# Patient Record
Sex: FEMALE | Race: WHITE | Employment: OTHER | URBAN - NONMETROPOLITAN AREA
[De-identification: names, ages, dates, MRNs, and addresses within clinical notes are randomized per-mention and may not be internally consistent; named-entity substitution may affect disease eponyms.]

---

## 2017-01-27 ENCOUNTER — OFFICE VISIT (OUTPATIENT)
Dept: PRIMARY CARE CLINIC | Age: 72
End: 2017-01-27

## 2017-01-27 VITALS
BODY MASS INDEX: 25.19 KG/M2 | OXYGEN SATURATION: 97 % | HEART RATE: 85 BPM | WEIGHT: 151.2 LBS | DIASTOLIC BLOOD PRESSURE: 68 MMHG | HEIGHT: 65 IN | SYSTOLIC BLOOD PRESSURE: 116 MMHG | RESPIRATION RATE: 16 BRPM

## 2017-01-27 DIAGNOSIS — M79.632 LEFT FOREARM PAIN: Primary | ICD-10-CM

## 2017-01-27 PROCEDURE — 99213 OFFICE O/P EST LOW 20 MIN: CPT | Performed by: NURSE PRACTITIONER

## 2017-01-27 RX ORDER — PREDNISONE 20 MG/1
20 TABLET ORAL 2 TIMES DAILY
Qty: 10 TABLET | Refills: 0 | Status: SHIPPED | OUTPATIENT
Start: 2017-01-27 | End: 2017-02-01

## 2017-02-08 DIAGNOSIS — N32.89 SPASTIC BLADDER: ICD-10-CM

## 2017-02-08 RX ORDER — OXYBUTYNIN CHLORIDE 10 MG/1
TABLET, EXTENDED RELEASE ORAL
Qty: 90 TABLET | Refills: 3 | Status: SHIPPED | OUTPATIENT
Start: 2017-02-08 | End: 2018-02-05 | Stop reason: SDUPTHER

## 2017-02-09 ENCOUNTER — OFFICE VISIT (OUTPATIENT)
Dept: INTERNAL MEDICINE | Age: 72
End: 2017-02-09

## 2017-02-09 VITALS
HEIGHT: 65 IN | HEART RATE: 72 BPM | BODY MASS INDEX: 25.39 KG/M2 | DIASTOLIC BLOOD PRESSURE: 62 MMHG | WEIGHT: 152.4 LBS | SYSTOLIC BLOOD PRESSURE: 138 MMHG

## 2017-02-09 DIAGNOSIS — M79.632 LEFT FOREARM PAIN: Primary | ICD-10-CM

## 2017-02-09 PROCEDURE — L3908 WHO COCK-UP NONMOLDE PRE OTS: HCPCS | Performed by: NURSE PRACTITIONER

## 2017-02-09 PROCEDURE — 99213 OFFICE O/P EST LOW 20 MIN: CPT | Performed by: NURSE PRACTITIONER

## 2017-02-13 ASSESSMENT — ENCOUNTER SYMPTOMS: COLOR CHANGE: 0

## 2017-02-15 ENCOUNTER — OFFICE VISIT (OUTPATIENT)
Dept: ORTHOPEDIC SURGERY | Age: 72
End: 2017-02-15

## 2017-02-15 VITALS
HEART RATE: 79 BPM | BODY MASS INDEX: 25.33 KG/M2 | DIASTOLIC BLOOD PRESSURE: 66 MMHG | HEIGHT: 65 IN | WEIGHT: 152 LBS | SYSTOLIC BLOOD PRESSURE: 132 MMHG

## 2017-02-15 DIAGNOSIS — M65.4 DE QUERVAIN'S TENOSYNOVITIS, LEFT: Primary | ICD-10-CM

## 2017-02-15 PROCEDURE — L3923 HFO WITHOUT JOINTS PRE CST: HCPCS | Performed by: FAMILY MEDICINE

## 2017-02-15 PROCEDURE — 20550 NJX 1 TENDON SHEATH/LIGAMENT: CPT | Performed by: FAMILY MEDICINE

## 2017-02-15 PROCEDURE — 99214 OFFICE O/P EST MOD 30 MIN: CPT | Performed by: FAMILY MEDICINE

## 2017-02-15 RX ORDER — BUPIVACAINE HYDROCHLORIDE 5 MG/ML
1 INJECTION, SOLUTION PERINEURAL ONCE
Status: COMPLETED | OUTPATIENT
Start: 2017-02-15 | End: 2017-02-15

## 2017-02-15 RX ORDER — BETAMETHASONE SODIUM PHOSPHATE AND BETAMETHASONE ACETATE 3; 3 MG/ML; MG/ML
6 INJECTION, SUSPENSION INTRA-ARTICULAR; INTRALESIONAL; INTRAMUSCULAR; SOFT TISSUE ONCE
Status: COMPLETED | OUTPATIENT
Start: 2017-02-15 | End: 2017-02-15

## 2017-02-15 RX ADMIN — BETAMETHASONE SODIUM PHOSPHATE AND BETAMETHASONE ACETATE 6 MG: 3; 3 INJECTION, SUSPENSION INTRA-ARTICULAR; INTRALESIONAL; INTRAMUSCULAR; SOFT TISSUE at 15:02

## 2017-02-15 RX ADMIN — BUPIVACAINE HYDROCHLORIDE 5 MG: 5 INJECTION, SOLUTION PERINEURAL at 15:03

## 2017-03-02 ENCOUNTER — OFFICE VISIT (OUTPATIENT)
Dept: ORTHOPEDIC SURGERY | Age: 72
End: 2017-03-02

## 2017-03-02 VITALS
HEART RATE: 73 BPM | BODY MASS INDEX: 25.83 KG/M2 | SYSTOLIC BLOOD PRESSURE: 142 MMHG | WEIGHT: 155 LBS | DIASTOLIC BLOOD PRESSURE: 81 MMHG | HEIGHT: 65 IN

## 2017-03-02 DIAGNOSIS — M65.4 DE QUERVAIN'S TENOSYNOVITIS, LEFT: ICD-10-CM

## 2017-03-02 DIAGNOSIS — M17.0 PRIMARY OSTEOARTHRITIS OF KNEES, BILATERAL: Primary | ICD-10-CM

## 2017-03-02 PROCEDURE — 20610 DRAIN/INJ JOINT/BURSA W/O US: CPT | Performed by: FAMILY MEDICINE

## 2017-03-02 PROCEDURE — 99214 OFFICE O/P EST MOD 30 MIN: CPT | Performed by: FAMILY MEDICINE

## 2017-03-02 RX ORDER — BUPIVACAINE HYDROCHLORIDE 5 MG/ML
4 INJECTION, SOLUTION PERINEURAL ONCE
Status: COMPLETED | OUTPATIENT
Start: 2017-03-02 | End: 2017-03-02

## 2017-03-02 RX ORDER — TRIAMCINOLONE ACETONIDE 40 MG/ML
40 INJECTION, SUSPENSION INTRA-ARTICULAR; INTRAMUSCULAR ONCE
Status: COMPLETED | OUTPATIENT
Start: 2017-03-02 | End: 2017-03-02

## 2017-03-02 RX ADMIN — TRIAMCINOLONE ACETONIDE 40 MG: 40 INJECTION, SUSPENSION INTRA-ARTICULAR; INTRAMUSCULAR at 09:35

## 2017-03-02 RX ADMIN — BUPIVACAINE HYDROCHLORIDE 20 MG: 5 INJECTION, SOLUTION PERINEURAL at 09:33

## 2017-03-02 RX ADMIN — TRIAMCINOLONE ACETONIDE 40 MG: 40 INJECTION, SUSPENSION INTRA-ARTICULAR; INTRAMUSCULAR at 09:34

## 2017-03-02 RX ADMIN — BUPIVACAINE HYDROCHLORIDE 20 MG: 5 INJECTION, SOLUTION PERINEURAL at 09:34

## 2017-04-05 ENCOUNTER — OFFICE VISIT (OUTPATIENT)
Dept: ORTHOPEDIC SURGERY | Age: 72
End: 2017-04-05
Payer: MEDICARE

## 2017-04-05 ENCOUNTER — OFFICE VISIT (OUTPATIENT)
Dept: INTERNAL MEDICINE | Age: 72
End: 2017-04-05
Payer: MEDICARE

## 2017-04-05 VITALS
HEART RATE: 90 BPM | WEIGHT: 155 LBS | SYSTOLIC BLOOD PRESSURE: 168 MMHG | BODY MASS INDEX: 25.83 KG/M2 | DIASTOLIC BLOOD PRESSURE: 93 MMHG | HEIGHT: 65 IN

## 2017-04-05 VITALS
DIASTOLIC BLOOD PRESSURE: 82 MMHG | BODY MASS INDEX: 24.16 KG/M2 | HEART RATE: 80 BPM | SYSTOLIC BLOOD PRESSURE: 156 MMHG | WEIGHT: 145 LBS | HEIGHT: 65 IN

## 2017-04-05 DIAGNOSIS — I10 ESSENTIAL HYPERTENSION: ICD-10-CM

## 2017-04-05 DIAGNOSIS — M65.832 EXTENSOR TENOSYNOVITIS OF WRIST, LEFT: ICD-10-CM

## 2017-04-05 DIAGNOSIS — M65.4 DE QUERVAIN'S TENOSYNOVITIS, LEFT: Primary | ICD-10-CM

## 2017-04-05 PROCEDURE — 99213 OFFICE O/P EST LOW 20 MIN: CPT | Performed by: NURSE PRACTITIONER

## 2017-04-05 PROCEDURE — 99213 OFFICE O/P EST LOW 20 MIN: CPT | Performed by: FAMILY MEDICINE

## 2017-04-05 RX ORDER — HYDROCHLOROTHIAZIDE 25 MG/1
25 TABLET ORAL DAILY
Qty: 30 TABLET | Refills: 0 | Status: SHIPPED | OUTPATIENT
Start: 2017-04-05 | End: 2017-04-06 | Stop reason: CLARIF

## 2017-04-05 RX ORDER — LISINOPRIL AND HYDROCHLOROTHIAZIDE 25; 20 MG/1; MG/1
1 TABLET ORAL DAILY
Qty: 90 TABLET | Refills: 2 | Status: SHIPPED | OUTPATIENT
Start: 2017-04-05 | End: 2017-04-19

## 2017-04-06 ASSESSMENT — ENCOUNTER SYMPTOMS
COUGH: 0
PHOTOPHOBIA: 0
WHEEZING: 0
SHORTNESS OF BREATH: 0

## 2017-04-19 ENCOUNTER — OFFICE VISIT (OUTPATIENT)
Dept: ORTHOPEDIC SURGERY | Age: 72
End: 2017-04-19
Payer: MEDICARE

## 2017-04-19 VITALS
SYSTOLIC BLOOD PRESSURE: 122 MMHG | WEIGHT: 155 LBS | HEIGHT: 65 IN | HEART RATE: 64 BPM | BODY MASS INDEX: 25.83 KG/M2 | DIASTOLIC BLOOD PRESSURE: 78 MMHG

## 2017-04-19 DIAGNOSIS — M17.0 PRIMARY OSTEOARTHRITIS OF KNEES, BILATERAL: Primary | ICD-10-CM

## 2017-04-19 DIAGNOSIS — M65.4 DE QUERVAIN'S TENOSYNOVITIS, LEFT: ICD-10-CM

## 2017-04-19 PROCEDURE — 20610 DRAIN/INJ JOINT/BURSA W/O US: CPT | Performed by: FAMILY MEDICINE

## 2017-04-19 PROCEDURE — 99214 OFFICE O/P EST MOD 30 MIN: CPT | Performed by: FAMILY MEDICINE

## 2017-04-19 RX ORDER — HYALURONATE SODIUM 10 MG/ML
20 SYRINGE (ML) INTRAARTICULAR ONCE
Status: COMPLETED | OUTPATIENT
Start: 2017-04-19 | End: 2017-04-20

## 2017-04-19 RX ORDER — HYALURONATE SODIUM 10 MG/ML
20 SYRINGE (ML) INTRAARTICULAR WEEKLY
Status: DISCONTINUED | OUTPATIENT
Start: 2017-04-19 | End: 2017-04-19

## 2017-04-19 RX ADMIN — Medication 20 MG: at 12:56

## 2017-04-19 RX ADMIN — Medication 20 MG: at 12:59

## 2017-04-20 RX ADMIN — Medication 20 MG: at 08:51

## 2017-04-20 RX ADMIN — Medication 20 MG: at 08:52

## 2017-04-26 ENCOUNTER — OFFICE VISIT (OUTPATIENT)
Dept: ORTHOPEDIC SURGERY | Age: 72
End: 2017-04-26
Payer: MEDICARE

## 2017-04-26 VITALS
WEIGHT: 155 LBS | HEIGHT: 65 IN | SYSTOLIC BLOOD PRESSURE: 160 MMHG | HEART RATE: 62 BPM | BODY MASS INDEX: 25.83 KG/M2 | DIASTOLIC BLOOD PRESSURE: 76 MMHG

## 2017-04-26 DIAGNOSIS — M17.0 BILATERAL PRIMARY OSTEOARTHRITIS OF KNEE: Primary | ICD-10-CM

## 2017-04-26 PROCEDURE — 20610 DRAIN/INJ JOINT/BURSA W/O US: CPT | Performed by: FAMILY MEDICINE

## 2017-04-26 RX ORDER — HYALURONATE SODIUM 10 MG/ML
20 SYRINGE (ML) INTRAARTICULAR ONCE
Status: COMPLETED | OUTPATIENT
Start: 2017-04-26 | End: 2017-04-26

## 2017-04-26 RX ADMIN — Medication 20 MG: at 13:21

## 2017-04-26 RX ADMIN — Medication 20 MG: at 13:22

## 2017-05-10 ENCOUNTER — OFFICE VISIT (OUTPATIENT)
Dept: ORTHOPEDIC SURGERY | Age: 72
End: 2017-05-10
Payer: MEDICARE

## 2017-05-10 VITALS — HEIGHT: 65 IN | BODY MASS INDEX: 25.83 KG/M2 | WEIGHT: 155 LBS

## 2017-05-10 DIAGNOSIS — M17.0 PRIMARY OSTEOARTHRITIS OF BOTH KNEES: Primary | ICD-10-CM

## 2017-05-10 PROCEDURE — 99024 POSTOP FOLLOW-UP VISIT: CPT | Performed by: FAMILY MEDICINE

## 2017-05-10 PROCEDURE — 20610 DRAIN/INJ JOINT/BURSA W/O US: CPT | Performed by: FAMILY MEDICINE

## 2017-05-10 RX ORDER — HYALURONATE SODIUM 10 MG/ML
20 SYRINGE (ML) INTRAARTICULAR ONCE
Status: COMPLETED | OUTPATIENT
Start: 2017-05-10 | End: 2017-05-10

## 2017-05-10 RX ORDER — MELOXICAM 15 MG/1
15 TABLET ORAL DAILY
Qty: 30 TABLET | Refills: 2 | Status: SHIPPED | OUTPATIENT
Start: 2017-05-10 | End: 2017-06-26

## 2017-05-10 RX ADMIN — Medication 20 MG: at 11:04

## 2017-05-10 RX ADMIN — Medication 20 MG: at 11:03

## 2017-05-11 RX ORDER — SIMVASTATIN 20 MG
TABLET ORAL
Qty: 90 TABLET | Refills: 3 | Status: SHIPPED | OUTPATIENT
Start: 2017-05-11 | End: 2017-11-27

## 2017-05-24 ENCOUNTER — OFFICE VISIT (OUTPATIENT)
Dept: PULMONOLOGY | Age: 72
End: 2017-05-24
Payer: MEDICARE

## 2017-05-24 ENCOUNTER — TELEPHONE (OUTPATIENT)
Dept: ORTHOPEDIC SURGERY | Age: 72
End: 2017-05-24

## 2017-05-24 VITALS
RESPIRATION RATE: 18 BRPM | HEART RATE: 61 BPM | WEIGHT: 145.8 LBS | BODY MASS INDEX: 24.29 KG/M2 | SYSTOLIC BLOOD PRESSURE: 124 MMHG | HEIGHT: 65 IN | DIASTOLIC BLOOD PRESSURE: 62 MMHG | OXYGEN SATURATION: 99 %

## 2017-05-24 DIAGNOSIS — Z86.19: ICD-10-CM

## 2017-05-24 DIAGNOSIS — B44.9 ASPERGILLUS (HCC): ICD-10-CM

## 2017-05-24 DIAGNOSIS — A31.0 PULMONARY MAI (MYCOBACTERIUM AVIUM-INTRACELLULARE) INFECTION (HCC): Primary | ICD-10-CM

## 2017-05-24 DIAGNOSIS — I10 ESSENTIAL HYPERTENSION: ICD-10-CM

## 2017-05-24 PROCEDURE — 99214 OFFICE O/P EST MOD 30 MIN: CPT | Performed by: INTERNAL MEDICINE

## 2017-06-07 DIAGNOSIS — M25.561 CHRONIC PAIN OF BOTH KNEES: Primary | ICD-10-CM

## 2017-06-07 DIAGNOSIS — M25.562 CHRONIC PAIN OF BOTH KNEES: Primary | ICD-10-CM

## 2017-06-07 DIAGNOSIS — G89.29 CHRONIC PAIN OF BOTH KNEES: Primary | ICD-10-CM

## 2017-06-08 ENCOUNTER — TELEPHONE (OUTPATIENT)
Dept: ORTHOPEDIC SURGERY | Age: 72
End: 2017-06-08

## 2017-06-12 RX ORDER — LEVOTHYROXINE SODIUM 0.05 MG/1
TABLET ORAL
Qty: 90 TABLET | Refills: 3 | Status: SHIPPED | OUTPATIENT
Start: 2017-06-12 | End: 2018-06-07 | Stop reason: SDUPTHER

## 2017-06-15 RX ORDER — AMLODIPINE BESYLATE 10 MG/1
TABLET ORAL
Qty: 90 TABLET | Refills: 3 | Status: SHIPPED | OUTPATIENT
Start: 2017-06-15 | End: 2018-06-11 | Stop reason: SDUPTHER

## 2017-06-23 ENCOUNTER — NURSE ONLY (OUTPATIENT)
Dept: LAB | Age: 72
End: 2017-06-23
Payer: MEDICARE

## 2017-06-23 DIAGNOSIS — I10 ESSENTIAL HYPERTENSION: ICD-10-CM

## 2017-06-23 DIAGNOSIS — A31.0 PULMONARY MAI (MYCOBACTERIUM AVIUM-INTRACELLULARE) INFECTION (HCC): ICD-10-CM

## 2017-06-23 LAB
ALBUMIN SERPL-MCNC: 4.2 G/DL (ref 3.5–5.2)
ALBUMIN/GLOBULIN RATIO: 1.6 (ref 1–2.5)
ALP BLD-CCNC: 61 U/L (ref 35–104)
ALT SERPL-CCNC: 15 U/L (ref 5–33)
ANION GAP SERPL CALCULATED.3IONS-SCNC: 12 MMOL/L (ref 9–17)
AST SERPL-CCNC: 15 U/L
BILIRUB SERPL-MCNC: 0.58 MG/DL (ref 0.3–1.2)
BILIRUBIN DIRECT: 0.17 MG/DL
BILIRUBIN, INDIRECT: 0.41 MG/DL (ref 0–1)
BUN BLDV-MCNC: 28 MG/DL (ref 8–23)
BUN/CREAT BLD: 31 (ref 9–20)
CALCIUM SERPL-MCNC: 9.6 MG/DL (ref 8.6–10.4)
CHLORIDE BLD-SCNC: 103 MMOL/L (ref 98–107)
CO2: 30 MMOL/L (ref 20–31)
CREAT SERPL-MCNC: 0.89 MG/DL (ref 0.5–0.9)
GFR AFRICAN AMERICAN: >60 ML/MIN
GFR NON-AFRICAN AMERICAN: >60 ML/MIN
GFR SERPL CREATININE-BSD FRML MDRD: ABNORMAL ML/MIN/{1.73_M2}
GFR SERPL CREATININE-BSD FRML MDRD: ABNORMAL ML/MIN/{1.73_M2}
GLOBULIN: 2.6 G/DL (ref 1.5–3.8)
GLUCOSE BLD-MCNC: 99 MG/DL (ref 70–99)
POTASSIUM SERPL-SCNC: 3.9 MMOL/L (ref 3.7–5.3)
SODIUM BLD-SCNC: 145 MMOL/L (ref 135–144)
TOTAL PROTEIN: 6.8 G/DL (ref 6.4–8.3)

## 2017-06-23 PROCEDURE — 36415 COLL VENOUS BLD VENIPUNCTURE: CPT | Performed by: NURSE PRACTITIONER

## 2017-06-23 PROCEDURE — 80076 HEPATIC FUNCTION PANEL: CPT | Performed by: INTERNAL MEDICINE

## 2017-06-23 PROCEDURE — 80048 BASIC METABOLIC PNL TOTAL CA: CPT | Performed by: NURSE PRACTITIONER

## 2017-06-26 ENCOUNTER — OFFICE VISIT (OUTPATIENT)
Dept: INTERNAL MEDICINE | Age: 72
End: 2017-06-26
Payer: MEDICARE

## 2017-06-26 VITALS
SYSTOLIC BLOOD PRESSURE: 118 MMHG | RESPIRATION RATE: 16 BRPM | BODY MASS INDEX: 24.26 KG/M2 | HEART RATE: 62 BPM | HEIGHT: 65 IN | DIASTOLIC BLOOD PRESSURE: 60 MMHG | WEIGHT: 145.6 LBS

## 2017-06-26 DIAGNOSIS — J47.9 BRONCHIECTASIS WITHOUT COMPLICATION (HCC): ICD-10-CM

## 2017-06-26 DIAGNOSIS — E78.00 PURE HYPERCHOLESTEROLEMIA: Primary | ICD-10-CM

## 2017-06-26 DIAGNOSIS — Z12.31 ENCOUNTER FOR SCREENING MAMMOGRAM FOR MALIGNANT NEOPLASM OF BREAST: ICD-10-CM

## 2017-06-26 DIAGNOSIS — E03.9 HYPOTHYROIDISM, UNSPECIFIED TYPE: ICD-10-CM

## 2017-06-26 DIAGNOSIS — M25.561 RIGHT KNEE PAIN, UNSPECIFIED CHRONICITY: ICD-10-CM

## 2017-06-26 DIAGNOSIS — I10 ESSENTIAL HYPERTENSION: ICD-10-CM

## 2017-06-26 PROCEDURE — 99214 OFFICE O/P EST MOD 30 MIN: CPT | Performed by: INTERNAL MEDICINE

## 2017-06-26 ASSESSMENT — PATIENT HEALTH QUESTIONNAIRE - PHQ9
SUM OF ALL RESPONSES TO PHQ9 QUESTIONS 1 & 2: 0
2. FEELING DOWN, DEPRESSED OR HOPELESS: 0
1. LITTLE INTEREST OR PLEASURE IN DOING THINGS: 0
SUM OF ALL RESPONSES TO PHQ QUESTIONS 1-9: 0

## 2017-06-26 ASSESSMENT — ENCOUNTER SYMPTOMS
NAUSEA: 0
VOMITING: 0
BACK PAIN: 0
BLOOD IN STOOL: 0
DIARRHEA: 0
COUGH: 0
SHORTNESS OF BREATH: 0
ABDOMINAL PAIN: 0
EYE PAIN: 0
CONSTIPATION: 0

## 2017-06-28 ENCOUNTER — OFFICE VISIT (OUTPATIENT)
Dept: ORTHOPEDIC SURGERY | Age: 72
End: 2017-06-28
Payer: MEDICARE

## 2017-06-28 VITALS
WEIGHT: 145.06 LBS | DIASTOLIC BLOOD PRESSURE: 69 MMHG | BODY MASS INDEX: 24.17 KG/M2 | SYSTOLIC BLOOD PRESSURE: 165 MMHG | HEIGHT: 65 IN | HEART RATE: 54 BPM

## 2017-06-28 DIAGNOSIS — Z01.818 PRE-OP TESTING: ICD-10-CM

## 2017-06-28 DIAGNOSIS — M17.0 PRIMARY OSTEOARTHRITIS OF BOTH KNEES: Primary | ICD-10-CM

## 2017-06-28 PROCEDURE — 99203 OFFICE O/P NEW LOW 30 MIN: CPT | Performed by: ORTHOPAEDIC SURGERY

## 2017-06-28 RX ORDER — MELOXICAM 15 MG/1
15 TABLET ORAL DAILY
COMMUNITY
Start: 2017-05-10 | End: 2017-11-13 | Stop reason: ALTCHOICE

## 2017-06-28 RX ORDER — LISINOPRIL AND HYDROCHLOROTHIAZIDE 25; 20 MG/1; MG/1
1 TABLET ORAL EVERY EVENING
COMMUNITY
Start: 2017-06-16 | End: 2018-05-08 | Stop reason: ALTCHOICE

## 2017-06-28 ASSESSMENT — PATIENT HEALTH QUESTIONNAIRE - PHQ9
1. LITTLE INTEREST OR PLEASURE IN DOING THINGS: 0
SUM OF ALL RESPONSES TO PHQ9 QUESTIONS 1 & 2: 0
2. FEELING DOWN, DEPRESSED OR HOPELESS: 0
SUM OF ALL RESPONSES TO PHQ QUESTIONS 1-9: 0

## 2017-06-28 ASSESSMENT — KOOS JR
HOW SEVERE IS YOUR KNEE STIFFNESS AFTER FIRST WAKING IN MORNING: 2
BENDING TO THE FLOOR TO PICK UP OBJECT: 1
RISING FROM SITTING: 2
STRAIGHTENING KNEE FULLY: 2
GOING UP OR DOWN STAIRS: 3
TWISING OR PIVOTING ON KNEE: 3
STANDING UPRIGHT: 4

## 2017-06-28 ASSESSMENT — PROMIS GLOBAL HEALTH SCALE
TO WHAT EXTENT ARE YOU ABLE TO CARRY OUT YOUR EVERYDAY PHYSICAL ACTIVITIES SUCH AS WALKING, CLIMBING STAIRS, CARRYING GROCERIES, OR MOVING A CHAIR [ON A SCALE OF 1 (NOT AT ALL) TO 5 (COMPLETELY)]?: 1
WHO IS THE PERSON COMPLETING THE PROMIS V1.1 SURVEY?: 0
IN GENERAL, HOW WOULD YOU RATE YOUR SATISFACTION WITH YOUR SOCIAL ACTIVITIES AND RELATIONSHIPS [ON A SCALE OF 1 (POOR) TO 5 (EXCELLENT)]?: 5
IN GENERAL, WOULD YOU SAY YOUR HEALTH IS...[ON A SCALE OF 1 (POOR) TO 5 (EXCELLENT)]: 4
IN GENERAL, HOW WOULD YOU RATE YOUR PHYSICAL HEALTH [ON A SCALE OF 1 (POOR) TO 5 (EXCELLENT)]?: 4
IN THE PAST 7 DAYS, HOW WOULD YOU RATE YOUR FATIGUE ON AVERAGE [ON A SCALE FROM 1 (NONE) TO 5 (VERY SEVERE)]?: 5
IN GENERAL, HOW WOULD YOU RATE YOUR MENTAL HEALTH, INCLUDING YOUR MOOD AND YOUR ABILITY TO THINK [ON A SCALE OF 1 (POOR) TO 5 (EXCELLENT)]?: 5
IN THE PAST 7 DAYS, HOW WOULD YOU RATE YOUR PAIN ON AVERAGE [ON A SCALE FROM 0 (NO PAIN) TO 10 (WORST IMAGINABLE PAIN)]?: 8
SUM OF RESPONSES TO QUESTIONS 3, 6, 7, & 8: 18
IN THE PAST 7 DAYS, HOW OFTEN HAVE YOU BEEN BOTHERED BY EMOTIONAL PROBLEMS, SUCH AS FEELING ANXIOUS, DEPRESSED, OR IRRITABLE [ON A SCALE FROM 1 (NEVER) TO 5 (ALWAYS)]?: 1
HOW IS THE PROMIS V1.1 BEING ADMINISTERED?: 0
SUM OF RESPONSES TO QUESTIONS 2, 4, 5, & 10: 13
IN GENERAL, WOULD YOU SAY YOUR QUALITY OF LIFE IS...[ON A SCALE OF 1 (POOR) TO 5 (EXCELLENT)]: 2
IN GENERAL, PLEASE RATE HOW WELL YOU CARRY OUT YOUR USUAL SOCIAL ACTIVITIES (INCLUDES ACTIVITIES AT HOME, AT WORK, AND IN YOUR COMMUNITY, AND RESPONSIBILITIES AS A PARENT, CHILD, SPOUSE, EMPLOYEE, FRIEND, ETC) [ON A SCALE OF 1 (POOR) TO 5 (EXCELLENT)]?: 3

## 2017-07-09 ASSESSMENT — ENCOUNTER SYMPTOMS
ROS SKIN COMMENTS: NEGATIVE FOR RASH
EYE DISCHARGE: 0
SHORTNESS OF BREATH: 0
ABDOMINAL PAIN: 0

## 2017-07-18 ENCOUNTER — TELEPHONE (OUTPATIENT)
Dept: ORTHOPEDIC SURGERY | Age: 72
End: 2017-07-18

## 2017-07-26 ENCOUNTER — HOSPITAL ENCOUNTER (OUTPATIENT)
Dept: PREADMISSION TESTING | Age: 72
Discharge: HOME | End: 2017-07-26
Payer: MEDICARE

## 2017-08-25 ENCOUNTER — TELEPHONE (OUTPATIENT)
Dept: INTERNAL MEDICINE | Age: 72
End: 2017-08-25

## 2017-08-25 RX ORDER — ETHAMBUTOL HYDROCHLORIDE 400 MG/1
800 TABLET, FILM COATED ORAL DAILY
Qty: 28 TABLET | Refills: 0 | Status: ON HOLD | OUTPATIENT
Start: 2017-08-25 | End: 2017-12-19 | Stop reason: ALTCHOICE

## 2017-08-25 RX ORDER — AZITHROMYCIN 500 MG/1
500 TABLET, FILM COATED ORAL DAILY
Qty: 14 TABLET | Refills: 0 | Status: SHIPPED | OUTPATIENT
Start: 2017-08-25 | End: 2017-11-27 | Stop reason: ALTCHOICE

## 2017-11-13 ENCOUNTER — OFFICE VISIT (OUTPATIENT)
Dept: INTERNAL MEDICINE | Age: 72
End: 2017-11-13
Payer: MEDICARE

## 2017-11-13 VITALS
WEIGHT: 150.8 LBS | RESPIRATION RATE: 14 BRPM | HEART RATE: 96 BPM | BODY MASS INDEX: 25.12 KG/M2 | OXYGEN SATURATION: 98 % | TEMPERATURE: 97.8 F | DIASTOLIC BLOOD PRESSURE: 76 MMHG | SYSTOLIC BLOOD PRESSURE: 122 MMHG | HEIGHT: 65 IN

## 2017-11-13 DIAGNOSIS — M17.11 LOCALIZED OSTEOARTHRITIS OF RIGHT KNEE: Primary | ICD-10-CM

## 2017-11-13 DIAGNOSIS — N32.89 SPASTIC BLADDER: ICD-10-CM

## 2017-11-13 DIAGNOSIS — Z86.19: ICD-10-CM

## 2017-11-13 DIAGNOSIS — E78.00 PURE HYPERCHOLESTEROLEMIA: ICD-10-CM

## 2017-11-13 DIAGNOSIS — E03.9 HYPOTHYROIDISM, UNSPECIFIED TYPE: ICD-10-CM

## 2017-11-13 DIAGNOSIS — I10 ESSENTIAL HYPERTENSION: ICD-10-CM

## 2017-11-13 DIAGNOSIS — Z01.818 PRE-OP EVALUATION: ICD-10-CM

## 2017-11-13 PROCEDURE — 99214 OFFICE O/P EST MOD 30 MIN: CPT | Performed by: NURSE PRACTITIONER

## 2017-11-13 ASSESSMENT — KOOS JR
TWISING OR PIVOTING ON KNEE: 4
STANDING UPRIGHT: 3
BENDING TO THE FLOOR TO PICK UP OBJECT: 1
HOW SEVERE IS YOUR KNEE STIFFNESS AFTER FIRST WAKING IN MORNING: 3
STRAIGHTENING KNEE FULLY: 3
GOING UP OR DOWN STAIRS: 2
RISING FROM SITTING: 2

## 2017-11-13 ASSESSMENT — PROMIS GLOBAL HEALTH SCALE
IN GENERAL, PLEASE RATE HOW WELL YOU CARRY OUT YOUR USUAL SOCIAL ACTIVITIES (INCLUDES ACTIVITIES AT HOME, AT WORK, AND IN YOUR COMMUNITY, AND RESPONSIBILITIES AS A PARENT, CHILD, SPOUSE, EMPLOYEE, FRIEND, ETC) [ON A SCALE OF 1 (POOR) TO 5 (EXCELLENT)]?: 2
IN GENERAL, WOULD YOU SAY YOUR QUALITY OF LIFE IS...[ON A SCALE OF 1 (POOR) TO 5 (EXCELLENT)]: 1
WHO IS THE PERSON COMPLETING THE PROMIS V1.1 SURVEY?: 0
HOW IS THE PROMIS V1.1 BEING ADMINISTERED?: 2
IN GENERAL, HOW WOULD YOU RATE YOUR SATISFACTION WITH YOUR SOCIAL ACTIVITIES AND RELATIONSHIPS [ON A SCALE OF 1 (POOR) TO 5 (EXCELLENT)]?: 5
SUM OF RESPONSES TO QUESTIONS 2, 4, 5, & 10: 13
IN GENERAL, HOW WOULD YOU RATE YOUR MENTAL HEALTH, INCLUDING YOUR MOOD AND YOUR ABILITY TO THINK [ON A SCALE OF 1 (POOR) TO 5 (EXCELLENT)]?: 4
IN GENERAL, HOW WOULD YOU RATE YOUR PHYSICAL HEALTH [ON A SCALE OF 1 (POOR) TO 5 (EXCELLENT)]?: 4
IN GENERAL, WOULD YOU SAY YOUR HEALTH IS...[ON A SCALE OF 1 (POOR) TO 5 (EXCELLENT)]: 4
SUM OF RESPONSES TO QUESTIONS 3, 6, 7, & 8: 16
IN THE PAST 7 DAYS, HOW WOULD YOU RATE YOUR PAIN ON AVERAGE [ON A SCALE FROM 0 (NO PAIN) TO 10 (WORST IMAGINABLE PAIN)]?: 8
TO WHAT EXTENT ARE YOU ABLE TO CARRY OUT YOUR EVERYDAY PHYSICAL ACTIVITIES SUCH AS WALKING, CLIMBING STAIRS, CARRYING GROCERIES, OR MOVING A CHAIR [ON A SCALE OF 1 (NOT AT ALL) TO 5 (COMPLETELY)]?: 2
IN THE PAST 7 DAYS, HOW OFTEN HAVE YOU BEEN BOTHERED BY EMOTIONAL PROBLEMS, SUCH AS FEELING ANXIOUS, DEPRESSED, OR IRRITABLE [ON A SCALE FROM 1 (NEVER) TO 5 (ALWAYS)]?: 3
IN THE PAST 7 DAYS, HOW WOULD YOU RATE YOUR FATIGUE ON AVERAGE [ON A SCALE FROM 1 (NONE) TO 5 (VERY SEVERE)]?: 2

## 2017-11-15 ENCOUNTER — HOSPITAL ENCOUNTER (OUTPATIENT)
Dept: PREADMISSION TESTING | Age: 72
Discharge: HOME | End: 2017-11-15
Payer: MEDICARE

## 2017-11-15 VITALS
BODY MASS INDEX: 25.31 KG/M2 | WEIGHT: 151.9 LBS | RESPIRATION RATE: 16 BRPM | OXYGEN SATURATION: 100 % | SYSTOLIC BLOOD PRESSURE: 150 MMHG | DIASTOLIC BLOOD PRESSURE: 65 MMHG | HEIGHT: 65 IN | HEART RATE: 42 BPM

## 2017-11-15 LAB
ABO/RH: NORMAL
ANION GAP SERPL CALCULATED.3IONS-SCNC: 12 MMOL/L (ref 9–17)
ANTIBODY SCREEN: NEGATIVE
ARM BAND NUMBER: NORMAL
BILIRUBIN URINE: NEGATIVE
BUN BLDV-MCNC: 21 MG/DL (ref 8–23)
BUN/CREAT BLD: 22 (ref 9–20)
CALCIUM SERPL-MCNC: 9.4 MG/DL (ref 8.6–10.4)
CHLORIDE BLD-SCNC: 101 MMOL/L (ref 98–107)
CO2: 27 MMOL/L (ref 20–31)
COLOR: YELLOW
COMMENT UA: NORMAL
CREAT SERPL-MCNC: 0.95 MG/DL (ref 0.5–0.9)
EKG ATRIAL RATE: 83 BPM
EKG P AXIS: 63 DEGREES
EKG P-R INTERVAL: 150 MS
EKG Q-T INTERVAL: 412 MS
EKG QRS DURATION: 74 MS
EKG QTC CALCULATION (BAZETT): 484 MS
EKG R AXIS: -4 DEGREES
EKG T AXIS: 72 DEGREES
EKG VENTRICULAR RATE: 83 BPM
EXPIRATION DATE: NORMAL
GFR AFRICAN AMERICAN: >60 ML/MIN
GFR NON-AFRICAN AMERICAN: 58 ML/MIN
GFR SERPL CREATININE-BSD FRML MDRD: ABNORMAL ML/MIN/{1.73_M2}
GFR SERPL CREATININE-BSD FRML MDRD: ABNORMAL ML/MIN/{1.73_M2}
GLUCOSE BLD-MCNC: 81 MG/DL (ref 70–99)
GLUCOSE URINE: NEGATIVE
HCT VFR BLD CALC: 39.2 % (ref 36–46)
HEMOGLOBIN: 13.5 G/DL (ref 12–16)
KETONES, URINE: NEGATIVE
LEUKOCYTE ESTERASE, URINE: NEGATIVE
MCH RBC QN AUTO: 30 PG (ref 26–34)
MCHC RBC AUTO-ENTMCNC: 34.5 G/DL (ref 31–37)
MCV RBC AUTO: 86.8 FL (ref 80–100)
NITRITE, URINE: NEGATIVE
PDW BLD-RTO: 12.6 % (ref 11.5–14.5)
PH UA: 5.5 (ref 5–8)
PLATELET # BLD: 216 K/UL (ref 130–400)
PMV BLD AUTO: 8.5 FL (ref 6–12)
POTASSIUM SERPL-SCNC: 4.1 MMOL/L (ref 3.7–5.3)
PREALBUMIN: 27.4 MG/DL (ref 20–40)
PROTEIN UA: NEGATIVE
RBC # BLD: 4.52 M/UL (ref 4–5.2)
SODIUM BLD-SCNC: 140 MMOL/L (ref 135–144)
SPECIFIC GRAVITY UA: 1.01 (ref 1–1.03)
TURBIDITY: CLEAR
URINE HGB: NEGATIVE
UROBILINOGEN, URINE: NORMAL
WBC # BLD: 5.9 K/UL (ref 3.5–11)

## 2017-11-15 PROCEDURE — 93005 ELECTROCARDIOGRAM TRACING: CPT

## 2017-11-15 PROCEDURE — 84134 ASSAY OF PREALBUMIN: CPT

## 2017-11-15 PROCEDURE — 36415 COLL VENOUS BLD VENIPUNCTURE: CPT

## 2017-11-15 PROCEDURE — 81003 URINALYSIS AUTO W/O SCOPE: CPT

## 2017-11-15 PROCEDURE — 85027 COMPLETE CBC AUTOMATED: CPT

## 2017-11-15 PROCEDURE — 86900 BLOOD TYPING SEROLOGIC ABO: CPT

## 2017-11-15 PROCEDURE — 80048 BASIC METABOLIC PNL TOTAL CA: CPT

## 2017-11-15 PROCEDURE — 86901 BLOOD TYPING SEROLOGIC RH(D): CPT

## 2017-11-15 PROCEDURE — 86850 RBC ANTIBODY SCREEN: CPT

## 2017-11-15 PROCEDURE — 87641 MR-STAPH DNA AMP PROBE: CPT

## 2017-11-15 RX ORDER — VANCOMYCIN HYDROCHLORIDE 1 G/200ML
1000 INJECTION, SOLUTION INTRAVENOUS ONCE
Status: CANCELLED | OUTPATIENT
Start: 2017-12-19

## 2017-11-15 ASSESSMENT — PAIN DESCRIPTION - LOCATION: LOCATION: KNEE

## 2017-11-15 ASSESSMENT — PAIN DESCRIPTION - DESCRIPTORS: DESCRIPTORS: ACHING

## 2017-11-15 ASSESSMENT — PAIN SCALES - GENERAL: PAINLEVEL_OUTOF10: 2

## 2017-11-15 ASSESSMENT — PAIN DESCRIPTION - ORIENTATION: ORIENTATION: RIGHT

## 2017-11-15 ASSESSMENT — PAIN DESCRIPTION - FREQUENCY: FREQUENCY: CONTINUOUS

## 2017-11-15 ASSESSMENT — PAIN DESCRIPTION - PROGRESSION: CLINICAL_PROGRESSION: GRADUALLY WORSENING

## 2017-11-15 ASSESSMENT — PAIN DESCRIPTION - PAIN TYPE: TYPE: ACUTE PAIN

## 2017-11-15 NOTE — PRE-PROCEDURE INSTRUCTIONS
ARRIVE  [place] Wednesday, November 29 at 8:30 AM    Once you enter the hospital lobby, take the elevators to the second floor. Check-In is at the surgery registration desk. If you have been given a blood band, you must bring it with you the day of surgery. Continue to take your home medications as you normally do up to and including the night before surgery with the exception of any blood thinning medications. Please stop any blood thinning medications as directed by your surgeon or prescribing physician. Failure to stop certain medications may interfere with your scheduled surgery. These may include:  Aspirin, Warfarin (Coumadin), Clopidogrel (Plavix), Ibuprofen (Motrin, Advil), Naproxen (Aleve), Meloxicam (Mobic), Celecoxib (Celebrex), Eliquis, Pradaxa, Xarelto, Effient, Fish Oil, Herbal supplements. If you are diabetic, do not take any of your diabetic medications by mouth the morning of surgery. If you are taking insulin contact the doctor that manages your diabetes for instructions about any changes to your insulin dosages the day before surgery. Do not inject insulin or other injectable diabetic medications the morning of surgery unless otherwise instructed by the doctor who manages your diabetes. Please take the following medication(s) the day of surgery with a small sip of water:  Rifampin, amlodipine, lythroxine  Please use your inhalers at home the day of surgery. PREPARING FOR YOUR SURGERY:     Before surgery, you can play an important role in your own health. Because skin is not sterile, we need to be sure that your skin is as free of germs as possible before surgery by carefully washing before surgery. Preparing or prepping skin before surgery can reduce the risk of a surgical site infection.   Do not shave the area of your body where your surgery will be performed unless you received specific permission from your physician.     You will need to shower at home the night before surgery and the morning of surgery with a special soap called chlorhexidine gluconate (CHG*). *Not to be used by people allergic to Chlorhexidine Gluconate (CHG). Following these instructions will help you be sure that your skin is clean before surgery. Instructions on cleaning your skin before surgery: The night before your surgery:      You will need to shower with warm water (not hot) and the CHG soap.  Use a clean wash cloth and a clean towel. Have clean clothes available to put on after the shower.    First wash your hair with regular shampoo. Rinse your hair and body thoroughly to remove the shampoo. [facility] Wash your face with your regular soap or water only. Thoroughly rinse your body with warm water from the neck down.  Turn water off to prevent rinsing the soap off too soon.  With a clean wet washcloth and half of the CHG soap in the bottle, lather your entire body from the neck down. Do not use CHG soap near your eyes or ears to avoid injury to those areas.  Wash thoroughly, paying special attention to the area where your surgery will be performed.  Wash your body gently for five (5) minutes. Avoid scrubbing your skin too hard.  Turn the water back on and rinse your body thoroughly.  Pat yourself dry with a clean, soft towel. Do not apply lotion, cream or powder.  Dress with clean freshly washed clothes. The morning of surgery:     Repeat shower following steps above - using remaining half of CHG soap in bottle. Patient Instructions:    [facility] If you are having any type of anesthesia you are to have nothing to eat or drink after midnight the night before your surgery. This includes gum, mints, water or smoking or chewing tobacco.  The only exception to this is a small sip of water to take with any morning dose of heart, blood pressure, or seizure medications. No alcoholic beverages for 24 hours prior to surgery.      Bring experience as comfortable as possible    . Transportation After Your Surgery/Procedure: You will need a friend or family member to drive you home after your procedure. Your  must be 25years of age or older and able to sign off on your discharge instructions. A taxi cab or any other form of public transportation is not acceptable. Your friend or family member must stay at the hospital throughout your procedure. Someone must remain with you for the first 24 hours after your surgery if you receive anesthesia or medication. If you do not have someone to stay with you, your procedure may be cancelled.       If you have any other questions regarding your procedure or the day of surgery, please call [phone]      _________________________  ____________________________  Signature (Patient)              Signature (Provider) & date

## 2017-11-15 NOTE — H&P
[facility] History and Physical    Pt Name: Eliceo Vang  MRN: [id number]  YOB: 1945  Date of evaluation: 11/15/2017  Primary Care Physician: Azalia Ortiz MD    SUBJECTIVE:   History of Chief Complaint: This is Eliceo Vang a 74 y.o. female presents to [facility] for upcoming Right total knee arthroplasty by Dr Josi Weathers  for Right knee OA scheduled on 11/29/17 @1030 The patient c/o constant aching bilateral knee pain that progressively worsened over the past year. Her \"right is far worse than left\". She can only stand for short durations and \"not walk very far\" due to pain. She awakens at night two hours after falling asleep with severe sharp pain and can't get comfortable. 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 The patient denies fever, chills, cough, SOB, Chest pain, night sweats, or unexplained weight loss. NOTE: EKG done today showed Sinus rhythm with bigeminal PVC's and Possible Premature atrial complexes with Aberrant conduction. 2015 EKG hard copy on chart additionally showed PVC's  Patient had medical clearance at Dr Duke Auguste office 11/14/17 but information unavailable  Patient denies feeling extra beats, lightheadedness, dizziness, hx of syncope, palpitations, SOB or chest pain with the rhythm  No known heart problems     Past Medical History    has a past medical history of Bacillus fragilis infection; Bronchiectasis ([address] Chronic lower back pain; Chronic radicular lumbar pain; Degenerative disc disease; Hyperlipidemia; Hyperreflexic bladder; Hypertension; and Hypothyroidism.   Past Surgical History   has a past surgical history that includes Hysterectomy (1983); bronchoscopy (Left, 6/19/2012); other surgical history (Right, 4/21/11); Breast surgery (Left); Colonoscopy (9/13); Tonsillectomy; and other surgical history (Right, 10/2/15). Medications  Prior to Admission medications    Medication Sig Start Date End Date Taking? Authorizing Provider   ethambutol (MYAMBUTOL) 400 MG tablet Take 2 tablets by mouth daily  Patient taking differently: Take 800 mg by mouth every evening  8/25/17   Giuseppe Ramos MD   azithromycin (ZITHROMAX) 500 MG tablet Take 1 tablet by mouth daily  Patient taking differently: Take 500 mg by mouth every evening  8/25/17   Giuseppe Ramos MD   lisinopril-hydrochlorothiazide (PRINZIDE;ZESTORETIC) 20-25 MG per tablet Take 1 tablet by mouth every evening  6/16/17   Historical Provider, MD   mupirocin (BACTROBAN NASAL) 2 % nasal ointment Take by Nasal route 2 times daily for 5 days 8/4/17 8/8/17  Eriberto Fowler DO   amLODIPine (NORVASC) 10 MG tablet TAKE 1 TABLET DAILY  Patient taking differently: TAKE 1 TABLET DAILY in the morning 6/15/17   Azalia Ortiz MD   levothyroxine (SYNTHROID) 50 MCG tablet TAKE 1 TABLET DAILY  Patient taking differently: TAKE 1 TABLET DAILY in the morning 6/12/17   Azalia Ortiz MD   simvastatin (ZOCOR) 20 MG tablet TAKE 1 TABLET DAILY  Patient taking differently: TAKE 1 TABLET DAILY in the evening 5/11/17   Azalia Ortiz MD   oxybutynin (DITROPAN-XL) 10 MG extended release tablet TAKE 1 TABLET DAILY  Patient taking differently: TAKE 1 TABLET DAILY in the morning 2/8/17   Azalia Ortiz MD   rifampin (RIFADIN) 300 MG capsule TAKE 1 CAPSULE DAILY  Patient taking differently: TAKE 1 CAPSULE DAILY in the morning 12/12/16   Azalia Ortiz MD   Multiple Vitamins-Minerals (MULTIVITAMIN PO) Take 2 tablets by mouth every morning     Historical Provider, MD     Allergies  is allergic to levaquin [levofloxacin in d5w]; iodine; and penicillins. Family History  family history includes Other in her mother.    Social History   reports that she quit smoking about 57 years WNL.    NOSE:  Nares patent. No rhinorrhea   THROAT:  Airway is patent, membranes are moist Dentition good  NECK:supple, no lymphadenopathy  No carotid bruits  LUNGS: Unlabored, normal effort, and clear to auscultation bilaterally, no wheezes, rales, or rhonchi. CARDIOVASCULAR: Heart sounds are normal.  HR 76 full apical minute without symptoms . EKG confirms SR with bigeminal PVC's No  murmur, gallop or rub. ABDOMEN: soft, round, non tender, non distended with bowel sounds present  EXTREMITIES:Slow to rise from sitting  Antalgic slow gait. No peripheral edema or calf tenderness bilaterally DP/PT pulses 2+ bilaterally   NEURO: Cranial nerves II-XII grossly intact Strength 5+/5+     Testing:   EK/15/17 done in [facility] - Sinus rhythm with occasional Premature ventricular complexes in a pattern of bigeminy and Possible Premature atrial complexes with Aberrant conduction  Abnormal EKG Physician review not completed Previous hard copy EKG showed multiple PVC's     Lab Review:   Recent Labs      11/15/17   1125  11/15/17   1119   HGB  13.5   --    HCT  39.2   --    WBC  5.9   --    MCV  86.8   --    NA   --   140   K   --   4.1   CL   --   101   CO2   --   27   BUN   --   21   CREATININE   --   0.95*   GLUCOSE   --   81       IMPRESSIONS:   1. Right knee OA  2.  has a past medical history of Bacillus fragilis infection; [address]chiectasis ([facility] [address] Chronic lower back pain (2016); Chronic radicular lumbar pain (10/2/2015); Degenerative disc disease; Hyperlipidemia; Hyperreflexic bladder; Hypertension; and Hypothyroidism. PLANS:   1.  Total right knee arthroplasty     Cory Wheeler, ANP-BC  Electronically signed 11/15/2017 at 12:16 PM

## 2017-11-16 LAB
MRSA, DNA, NASAL: NORMAL
SPECIMEN DESCRIPTION: NORMAL

## 2017-11-20 ENCOUNTER — HOSPITAL ENCOUNTER (OUTPATIENT)
Dept: CT IMAGING | Age: 72
Discharge: HOME | End: 2017-11-20
Payer: MEDICARE

## 2017-11-20 DIAGNOSIS — A31.0 PULMONARY MAI (MYCOBACTERIUM AVIUM-INTRACELLULARE) INFECTION (HCC): ICD-10-CM

## 2017-11-20 PROCEDURE — 71250 CT THORAX DX C-: CPT

## 2017-11-20 ASSESSMENT — ENCOUNTER SYMPTOMS
ORTHOPNEA: 0
ABDOMINAL PAIN: 0
HEMOPTYSIS: 0
COUGH: 0
EYE PAIN: 0
EYE REDNESS: 0
NAUSEA: 0
SPUTUM PRODUCTION: 0
CONSTIPATION: 0
DIARRHEA: 0
VOMITING: 0
SORE THROAT: 0
BLOOD IN STOOL: 0
EYE DISCHARGE: 0
SHORTNESS OF BREATH: 0
WHEEZING: 0

## 2017-11-20 NOTE — PROGRESS NOTES
11/13/17  Love Benjamin  1945      Chief Complaint: pre op evaluation with review of chronic health conditions     1. Localized osteoarthritis of right knee    2. Pre-op evaluation    3. Essential hypertension    4. Hypothyroidism, unspecified type    5. Pure hypercholesterolemia    6. History of atypical mycobacterial infection    7. Spastic bladder        HPI:Patient comes in for preoperative evaluation due to severe osteoarthritis of right knee. Patient is scheduled for right total knee arthroplasty with  [facility] on 471010. Overall patient has been doing well. Blood pressure has been stable on current regimen. Denies any chest discomfort. Shortness of breath. No lightheadedness or dizziness. Continues on levothyroxine for her hypothyroidism. Denies any excessive fatigue weight loss or weight gain. Cholesterol has been stable on her Zocor without myalgias. Does have a history of atypical microbacterial infection followed by ID and pulmonology. States she has approximately one week until her year of diagnosis and will be following up with infectious disease to stop medications. States she has a heart event told by pulmonology it is okay for her to proceed with procedure. Has not had any shortness of breath. No fevers chills. No sputum production. Denies any concerns about upcoming procedure.       Allergies   Allergen Reactions    Levaquin [Levofloxacin In D5w] Other (See Comments)     Attacked muscles and tendons    Iodine Other (See Comments)     Cantu       Penicillins Other (See Comments)     abcess at site of injection       Past Medical History:   Diagnosis Date    Bacillus fragilis infection     treated one year Dr Ramana Clay    Bronchiectasis [facility])     Chronic lower back pain 2/5/2016    Chronic radicular lumbar pain 10/2/2015    Degenerative disc disease     Hyperlipidemia     Hyperreflexic bladder     Hypertension     Hypothyroidism        Past Surgical History:   Procedure education: N/A     Occupational History    Not on file. Social History Main Topics    Smoking status: Former Smoker     Packs/day: 0.50     Years: 6.00     Types: Cigarettes     Quit date: 5/25/1960    Smokeless tobacco: Never Used      Comment: quit 35-40 years ago    Alcohol use No    Drug use: No    Sexual activity: Not on file     Other Topics Concern    Not on file     Social History Narrative    No narrative on file       Review of Systems   Constitutional: Negative for chills, diaphoresis and fever. HENT: Negative for congestion, nosebleeds and sore throat. Eyes: Negative for pain, discharge and redness. Respiratory: Negative for cough, hemoptysis, sputum production, shortness of breath and wheezing. Cardiovascular: Negative for chest pain, palpitations, orthopnea and leg swelling. Gastrointestinal: Negative for abdominal pain, blood in stool, constipation, diarrhea, nausea and vomiting. Genitourinary: Negative for dysuria and urgency. Musculoskeletal: Positive for joint pain. Negative for falls, myalgias and neck pain. Skin: Negative for rash. Neurological: Negative for dizziness, tremors, seizures, weakness and headaches. Psychiatric/Behavioral: Negative for depression and memory loss. The patient is not nervous/anxious. Physical Exam   Constitutional: She is oriented to person, place, and time and well-developed, well-nourished, and in no distress. No distress. HENT:   Head: Normocephalic and atraumatic. Right Ear: External ear normal.   Left Ear: External ear normal.   Eyes: Right eye exhibits no discharge. Left eye exhibits no discharge. Neck: Neck supple. No tracheal deviation present. Cardiovascular: Normal rate, regular rhythm and normal heart sounds. Exam reveals no gallop and no friction rub. No murmur heard. Pulmonary/Chest: Effort normal and breath sounds normal. No respiratory distress. She has no wheezes. She has no rales.  She exhibits no

## 2017-11-22 ENCOUNTER — OFFICE VISIT (OUTPATIENT)
Dept: PULMONOLOGY | Age: 72
End: 2017-11-22
Payer: MEDICARE

## 2017-11-22 ENCOUNTER — TELEPHONE (OUTPATIENT)
Dept: PULMONOLOGY | Age: 72
End: 2017-11-22

## 2017-11-22 VITALS
HEIGHT: 66 IN | RESPIRATION RATE: 14 BRPM | TEMPERATURE: 96.6 F | OXYGEN SATURATION: 98 % | BODY MASS INDEX: 24.36 KG/M2 | WEIGHT: 151.6 LBS | SYSTOLIC BLOOD PRESSURE: 116 MMHG | HEART RATE: 50 BPM | DIASTOLIC BLOOD PRESSURE: 74 MMHG

## 2017-11-22 DIAGNOSIS — A31.0 PULMONARY MAI (MYCOBACTERIUM AVIUM-INTRACELLULARE) INFECTION (HCC): Primary | ICD-10-CM

## 2017-11-22 DIAGNOSIS — I10 ESSENTIAL HYPERTENSION: ICD-10-CM

## 2017-11-22 DIAGNOSIS — B44.9 ASPERGILLUS (HCC): ICD-10-CM

## 2017-11-22 PROCEDURE — 99214 OFFICE O/P EST MOD 30 MIN: CPT | Performed by: INTERNAL MEDICINE

## 2017-11-22 RX ORDER — DIPHENHYDRAMINE HYDROCHLORIDE 12.5 MG/5ML
LIQUID ORAL
Refills: 0 | Status: ON HOLD | COMMUNITY
Start: 2017-11-17 | End: 2017-12-19 | Stop reason: ALTCHOICE

## 2017-11-22 RX ORDER — INFLUENZA VACCINE, ADJUVANTED 15; 15; 15 UG/.5ML; UG/.5ML; UG/.5ML
INJECTION, SUSPENSION INTRAMUSCULAR
Refills: 0 | COMMUNITY
Start: 2017-11-13 | End: 2018-02-01

## 2017-11-22 NOTE — TELEPHONE ENCOUNTER
I CALLED DR PATEL'S OFFICE AND SPOKE WITH LONG THE SURGERY SCHEDULER AND TOLD HER TO FAX A CLEARANCE FORM FOR DR WHITE TO SIGN OFF ON PER DR SOTO.

## 2017-11-22 NOTE — PROGRESS NOTES
[facility],1[address] Floor [facility]  [address] [place] [address], [facility] [address]   [address]  [place], [address] [place] [address]  Phone: [phone]  Fax: [phone]    Marty Ayadbigg. Shandra Carr M.D. Channing Crawford. Margarita Cho M.D. Bijan Burgos M.D. Rosalina Pereira M.D. Luma Belcher, Nurse Practitioner              SURGERY CLEARANCE FORM      Paul Villa  [birth year] 11/22/2017      Dear Dr. Lucinda Magaña    Thank you for having me participate in the preoperative evaluation of your patient, Paul Villa. Paul Villa is cleared for surgery    I have made the following assessment:    Minimal Risk    Paul Villa will need: Other continue Antibiotics for SAMM . dvt ptophylaxis     If you have any questions, please feel free to call me.      Sincerely,          Janee Mejia MD
days 8/4/17 8/8/17  Gifty Rodriguez, DO     Allergies   Allergen Reactions    Levaquin [Levofloxacin In D5w] Other (See Comments)     Attacked muscles and tendons    Iodine Other (See Comments)     Cantu       Penicillins Other (See Comments)     abcess at site of injection     History   Smoking Status    Former Smoker    Packs/day: 0.50    Years: 6.00    Types: Cigarettes    Quit date: 5/25/1960   Smokeless Tobacco    Never Used     Comment: quit 35-40 years ago           Physical Examoral pnd   Head and neck atraumatic, normocephalic    Lymph nodes-no cervical, supraclavicular lymphadenopathy    Neck-no JVP elevation    Lungs - Ventilating all lobes without any , rhonchi, wheezes or dullness. No bronchial breath sounds. Chest expansion equal bilaterally. Basal fibrotic rales unchanged    CVS- S1, S2 regular. No S3 no S4, no murmurs    Abdomen-nontender, nondistended. Bowel sounds are present. No organomegaly    Lower extremity-no edema    Upper extremity-no edema    Neurological-grossly normal cranial nerves. No overt motor deficit          /74 (Site: Right Arm, Position: Sitting, Cuff Size: Large Adult)   Pulse 50   Temp 96.6 °F (35.9 °C) (Tympanic)   Resp 14   Ht 5' 5.5\" (1.664 m)   Wt 151 lb 9.6 oz (68.8 kg)   LMP 08/15/1983 (Exact Date)   SpO2 98%   Breastfeeding? No   BMI 24.84 kg/m²           CT Scans  Reviewed  IAD291% predicted, FVC 99% predicted, ratio 81. Lung volumes and normal diffusion capacity. Normal mild airway resistance increase  Assessment  1. 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 Essential hypertension     3. Aspergillus (HCC)colonization of airways      Continue with present treatment of azithromycin, ethambutol, rifampin for a period of 2 years . was started   In October 2015 expected complete date is 10/17 .  But will do ct chest in November 17 review ct with ID prior to dc of anti tubercular meds

## 2017-11-27 ENCOUNTER — OFFICE VISIT (OUTPATIENT)
Dept: CARDIOLOGY | Age: 72
End: 2017-11-27
Payer: MEDICARE

## 2017-11-27 VITALS
WEIGHT: 150.8 LBS | SYSTOLIC BLOOD PRESSURE: 128 MMHG | DIASTOLIC BLOOD PRESSURE: 60 MMHG | HEIGHT: 66 IN | HEART RATE: 88 BPM | BODY MASS INDEX: 24.23 KG/M2

## 2017-11-27 DIAGNOSIS — Z01.810 PREOP CARDIOVASCULAR EXAM: ICD-10-CM

## 2017-11-27 DIAGNOSIS — E78.5 HYPERLIPIDEMIA WITH TARGET LOW DENSITY LIPOPROTEIN (LDL) CHOLESTEROL LESS THAN 130 MG/DL: ICD-10-CM

## 2017-11-27 DIAGNOSIS — I10 ESSENTIAL HYPERTENSION: Primary | ICD-10-CM

## 2017-11-27 DIAGNOSIS — I49.3 VENTRICULAR ECTOPY: ICD-10-CM

## 2017-11-27 DIAGNOSIS — R94.31 ABNORMAL ECG: ICD-10-CM

## 2017-11-27 PROCEDURE — 93000 ELECTROCARDIOGRAM COMPLETE: CPT | Performed by: INTERNAL MEDICINE

## 2017-11-27 PROCEDURE — 99203 OFFICE O/P NEW LOW 30 MIN: CPT | Performed by: INTERNAL MEDICINE

## 2017-11-27 RX ORDER — ATORVASTATIN CALCIUM 40 MG/1
40 TABLET, FILM COATED ORAL DAILY
Qty: 30 TABLET | Refills: 3 | Status: SHIPPED | OUTPATIENT
Start: 2017-11-27 | End: 2017-11-27 | Stop reason: SDUPTHER

## 2017-11-27 RX ORDER — ATORVASTATIN CALCIUM 40 MG/1
40 TABLET, FILM COATED ORAL DAILY
Qty: 90 TABLET | Refills: 3 | Status: SHIPPED | OUTPATIENT
Start: 2017-11-27 | End: 2018-10-17 | Stop reason: SDUPTHER

## 2017-11-27 NOTE — PROGRESS NOTES
Cardiology Consultation  [facility]    Patient is here for follow up:    HPI and Chief Complaint:  Елена Lal is here for new consultation prior to surgery. She has history of HTN, DL, and Bronchiectasis. She has been dealing with severe arthritis of bilateral knees. She went to [facility] for Preadmission Testing. There she had an abnormal ECG and was told to get cardiac evaluation prior to surgery. Unfortunately she is unable to climb 1 flight of stairs due to arthritis, also unable to walk around the block. Denies any chest pains, sob, palpitations, or Le edema.      Past Medical:  Past Medical History:   Diagnosis Date    Bacillus fragilis infection     treated one year Dr Alexandr Hudson   [facility] Bronchiectasis [facility])     Chronic lower back pain 2/5/2016    Chronic radicular lumbar pain 10/2/2015    Degenerative disc disease     Hyperlipidemia     Hyperreflexic bladder     Hypertension     Hypothyroidism        Past Surgical:  Past Surgical History:   Procedure Laterality Date    BREAST SURGERY Left 2012    core biopsy    BRONCHOSCOPY Left 6/19/2012    COLONOSCOPY  9/13    SIS 8 y    HYSTERECTOMY  1983    and BSO    OTHER SURGICAL HISTORY Right 4/21/11    right L4 Transforaminal epidural steroid injection    OTHER SURGICAL HISTORY Right 10/2/15    right L4 TFE    TONSILLECTOMY         Family History:  Family History   Problem Relation Age of Onset    Other Mother      Knee pain       Social History:  Social History     Tobacco History     Smoking Status  Former Smoker Quit date  5/25/1960 Smoking Frequency  0.5 packs/day for 6 years (3 pk yrs) Smoking Tobacco Type  Cigarettes    Smokeless Tobacco Use  Never Used    Tobacco Comment  quit 35-40 years ago          Alcohol History     Alcohol Use Status  No          Drug Use     Drug Use Status  No          Sexual Activity     Sexually Active  Not Asked                REVIEW OF SYSTEMS:    · Constitutional: there has been no unanticipated Results   Component Value Date    CHOLHDLRATIO 2.5 12/16/2016    CHOLHDLRATIO 2.3 06/29/2016    CHOLHDLRATIO 2.4 01/05/2016       Lab Results   Component Value Date     11/15/2017    K 4.1 11/15/2017     11/15/2017    CO2 27 11/15/2017    BUN 21 11/15/2017    CREATININE 0.95 (H) 11/15/2017    GLUCOSE 81 11/15/2017    CALCIUM 9.4 11/15/2017    PROT 6.8 06/23/2017    LABALBU 4.2 06/23/2017    BILITOT 0.58 06/23/2017    ALKPHOS 61 06/23/2017    AST 15 06/23/2017    ALT 15 06/23/2017    LABGLOM 58 (L) 11/15/2017    GFRAA >60 11/15/2017    GLOB 2.6 06/23/2017       EKG: sinus with frequent PVCs        Past Medical and Surgical History, Problem List, Allergies, Medications, Labs, Imaging, all reviewed extensively in EMR and with the patient. Assessment:  - Preop evaluation prior to Knee Arthroplasty  - Abnormal ECG with frequent ectopy  - HTN  - DL- not at goal  - Poor functional capacity    Plan:  - unable to achieve 4-5 Mets  - recommend lexiscan stress testing- if low risk then she can proceed at intermediate risk for her surgery  - add lopressor 25 mg BID for frequent PVCs  - Check 2d Echo for structural disease  - stop zocor, change to lipitor 40  - FLP in 6 months  - follow up in 6 months    Thank you for allowing me to participate in the care of this patient, please do not hesitate to call if you have any questions. Robert Gonzales, [id number] [facility]  [facility] [employer]  [phone]

## 2017-11-30 ENCOUNTER — TELEPHONE (OUTPATIENT)
Dept: INTERNAL MEDICINE | Age: 72
End: 2017-11-30

## 2017-12-01 ENCOUNTER — TELEPHONE (OUTPATIENT)
Dept: INFECTIOUS DISEASES | Age: 72
End: 2017-12-01

## 2017-12-01 NOTE — TELEPHONE ENCOUNTER
Patient needs a clearance for her knee surgery. Dr Yvonne Robles will be doing it. What are the risk? What are you recommendations? Please advise.

## 2017-12-01 NOTE — TELEPHONE ENCOUNTER
D/w DR Martin Cea will stop anti tubercular meds as has completed 2 years of treatment for SAMM   Monitor for symptom re occurrence   Munira Jackson MD

## 2017-12-01 NOTE — TELEPHONE ENCOUNTER
Anabell Nguyen, Dr. Axel Tamayo called this morning to check on the status of the surgical clearance for patient. He said that you will need to speak with Dr. Smita Pappas to obtain this for patient. Please work on getting this completed. Thank you.

## 2017-12-01 NOTE — TELEPHONE ENCOUNTER
Please check when I last saw her. I think it was in 2015. I dont know how she is doing pulmonary wise. I need more detail on what is planned, when and if pulmonary is going to clear her for surgery.

## 2017-12-05 NOTE — TELEPHONE ENCOUNTER
I spoke with Dr. Anitha Szymanski and he will like to see patient before he clears her for surgery, he has not seen the patient since 2016. I notified the patient, we were unable to get her in before Dec 19th, the day of her surgery. The patient said she will contact Dr. Samuel Schooling office to see if she still needs the clearance.

## 2017-12-11 ENCOUNTER — HOSPITAL ENCOUNTER (OUTPATIENT)
Dept: NON INVASIVE DIAGNOSTICS | Age: 72
Discharge: HOME | End: 2017-12-11
Payer: MEDICARE

## 2017-12-11 ENCOUNTER — HOSPITAL ENCOUNTER (OUTPATIENT)
Dept: NUCLEAR MEDICINE | Age: 72
Discharge: HOME | End: 2017-12-11
Payer: MEDICARE

## 2017-12-11 DIAGNOSIS — R94.31 ABNORMAL ECG: ICD-10-CM

## 2017-12-11 DIAGNOSIS — I49.3 VENTRICULAR ECTOPY: ICD-10-CM

## 2017-12-11 DIAGNOSIS — Z01.810 PREOP CARDIOVASCULAR EXAM: ICD-10-CM

## 2017-12-11 PROCEDURE — 78452 HT MUSCLE IMAGE SPECT MULT: CPT

## 2017-12-11 PROCEDURE — 3430000000 HC RX DIAGNOSTIC RADIOPHARMACEUTICAL: Performed by: INTERNAL MEDICINE

## 2017-12-11 PROCEDURE — 93306 TTE W/DOPPLER COMPLETE: CPT

## 2017-12-11 PROCEDURE — 93018 CV STRESS TEST I&R ONLY: CPT | Performed by: INTERNAL MEDICINE

## 2017-12-11 PROCEDURE — A9500 TC99M SESTAMIBI: HCPCS | Performed by: INTERNAL MEDICINE

## 2017-12-11 PROCEDURE — 6360000002 HC RX W HCPCS: Performed by: INTERNAL MEDICINE

## 2017-12-11 PROCEDURE — 93017 CV STRESS TEST TRACING ONLY: CPT

## 2017-12-11 RX ADMIN — TETRAKIS(2-METHOXYISOBUTYLISOCYANIDE)COPPER(I) TETRAFLUOROBORATE 10 MILLICURIE: 1 INJECTION, POWDER, LYOPHILIZED, FOR SOLUTION INTRAVENOUS at 15:42

## 2017-12-11 RX ADMIN — REGADENOSON 0.4 MG: 0.08 INJECTION, SOLUTION INTRAVENOUS at 14:32

## 2017-12-11 RX ADMIN — TETRAKIS(2-METHOXYISOBUTYLISOCYANIDE)COPPER(I) TETRAFLUOROBORATE 30 MILLICURIE: 1 INJECTION, POWDER, LYOPHILIZED, FOR SOLUTION INTRAVENOUS at 15:42

## 2017-12-13 RX ORDER — LISINOPRIL AND HYDROCHLOROTHIAZIDE 25; 20 MG/1; MG/1
TABLET ORAL
Qty: 90 TABLET | Refills: 3 | Status: ON HOLD | OUTPATIENT
Start: 2017-12-13 | End: 2017-12-19 | Stop reason: SDUPTHER

## 2017-12-13 NOTE — PROCEDURES
[address]                   [address] [place] [id number]                                CARDIAC STRESS TEST    PATIENT NAME: Teena Goldstein                     :        1945  MED REC NO:   [id number]                             ROOM:  ACCOUNT NO:   [de-identified]                           ADMIT DATE: 2017  PROVIDER:     Hassie Paget, DO    DATE OF STUDY:  2017    Nuclear Myocardial Perfusion Imaging (SPECT)    Ordering Physician:  Hassie Paget, DO    Referring Physician: Sultana Sanchez MD    Primary Care Provider: Sultana Sanchez MD    Patient's Age: 74        Height: 65 inches        Weight: 147 pounds    TESTING METHOD  INDICATION:  Pre-op, abnormal EKG    STRESS:   Lexiscan  AGENT:    Cardiolite  REST:     Injection Date: 2017 Time: 1315  Amount: 10.81 mCi  STRESS:   Injection Date: 2017 Time: 1420  Amount: 32.7 mCi  IMAGE TIME:    Rest: 1345     Stress: 8743    FINDINGS:  Ischemia (Reversible Defect): No  Infarction (Reversible Defect):    No  Ejection fraction Calculated: Normal, 60%  Segmental wall motion:   Normal  Diastolic LV Compliance (Stiffness):    IMPRESSION:  1. No ischemia or infarction. 2.  Normal LVEF = 60%. 3.  No wall motion abnormalities.        GIOVANNY RUSH DO    D: 2017 14:22:07       T: 2017 14:25:29     /[email or web address]  Job#: [id number]    Doc#: Unknown

## 2017-12-18 ENCOUNTER — ANESTHESIA EVENT (OUTPATIENT)
Dept: OPERATING ROOM | Age: 72
DRG: 470 | End: 2017-12-18
Payer: MEDICARE

## 2017-12-19 ENCOUNTER — ANESTHESIA (OUTPATIENT)
Dept: OPERATING ROOM | Age: 72
DRG: 470 | End: 2017-12-19
Payer: MEDICARE

## 2017-12-19 ENCOUNTER — HOSPITAL ENCOUNTER (INPATIENT)
Age: 72
LOS: 2 days | Discharge: HOME | DRG: 470 | End: 2017-12-21
Attending: ORTHOPAEDIC SURGERY | Admitting: ORTHOPAEDIC SURGERY
Payer: MEDICARE

## 2017-12-19 ENCOUNTER — APPOINTMENT (OUTPATIENT)
Dept: GENERAL RADIOLOGY | Age: 72
DRG: 470 | End: 2017-12-19
Attending: ORTHOPAEDIC SURGERY
Payer: MEDICARE

## 2017-12-19 VITALS — SYSTOLIC BLOOD PRESSURE: 87 MMHG | OXYGEN SATURATION: 95 % | DIASTOLIC BLOOD PRESSURE: 52 MMHG | TEMPERATURE: 95.5 F

## 2017-12-19 PROBLEM — Z96.651 S/P TOTAL KNEE REPLACEMENT, RIGHT: Status: ACTIVE | Noted: 2017-12-19

## 2017-12-19 LAB
ABO/RH: NORMAL
ANTIBODY SCREEN: NEGATIVE
ARM BAND NUMBER: NORMAL
EXPIRATION DATE: NORMAL

## 2017-12-19 PROCEDURE — 2720000010 HC SURG SUPPLY STERILE: Performed by: ORTHOPAEDIC SURGERY

## 2017-12-19 PROCEDURE — 36415 COLL VENOUS BLD VENIPUNCTURE: CPT

## 2017-12-19 PROCEDURE — 1200000000 HC SEMI PRIVATE

## 2017-12-19 PROCEDURE — 3700000000 HC ANESTHESIA ATTENDED CARE: Performed by: ORTHOPAEDIC SURGERY

## 2017-12-19 PROCEDURE — 3600000015 HC SURGERY LEVEL 5 ADDTL 15MIN: Performed by: ORTHOPAEDIC SURGERY

## 2017-12-19 PROCEDURE — 6360000002 HC RX W HCPCS: Performed by: ANESTHESIOLOGY

## 2017-12-19 PROCEDURE — A6197 ALGINATE DRSG >16 <=48 SQ IN: HCPCS | Performed by: ORTHOPAEDIC SURGERY

## 2017-12-19 PROCEDURE — 6360000002 HC RX W HCPCS: Performed by: SPECIALIST

## 2017-12-19 PROCEDURE — 0SRC0JZ REPLACEMENT OF RIGHT KNEE JOINT WITH SYNTHETIC SUBSTITUTE, OPEN APPROACH: ICD-10-PCS | Performed by: STUDENT IN AN ORGANIZED HEALTH CARE EDUCATION/TRAINING PROGRAM

## 2017-12-19 PROCEDURE — 2580000003 HC RX 258: Performed by: ANESTHESIOLOGY

## 2017-12-19 PROCEDURE — 86901 BLOOD TYPING SEROLOGIC RH(D): CPT

## 2017-12-19 PROCEDURE — 6370000000 HC RX 637 (ALT 250 FOR IP): Performed by: STUDENT IN AN ORGANIZED HEALTH CARE EDUCATION/TRAINING PROGRAM

## 2017-12-19 PROCEDURE — 2500000003 HC RX 250 WO HCPCS: Performed by: ORTHOPAEDIC SURGERY

## 2017-12-19 PROCEDURE — 6370000000 HC RX 637 (ALT 250 FOR IP): Performed by: ANESTHESIOLOGY

## 2017-12-19 PROCEDURE — 64447 NJX AA&/STRD FEMORAL NRV IMG: CPT | Performed by: ANESTHESIOLOGY

## 2017-12-19 PROCEDURE — 86850 RBC ANTIBODY SCREEN: CPT

## 2017-12-19 PROCEDURE — 7100000001 HC PACU RECOVERY - ADDTL 15 MIN: Performed by: ORTHOPAEDIC SURGERY

## 2017-12-19 PROCEDURE — 2500000003 HC RX 250 WO HCPCS: Performed by: ANESTHESIOLOGY

## 2017-12-19 PROCEDURE — 73560 X-RAY EXAM OF KNEE 1 OR 2: CPT

## 2017-12-19 PROCEDURE — C1713 ANCHOR/SCREW BN/BN,TIS/BN: HCPCS | Performed by: ORTHOPAEDIC SURGERY

## 2017-12-19 PROCEDURE — 6360000002 HC RX W HCPCS: Performed by: ORTHOPAEDIC SURGERY

## 2017-12-19 PROCEDURE — 2580000003 HC RX 258: Performed by: STUDENT IN AN ORGANIZED HEALTH CARE EDUCATION/TRAINING PROGRAM

## 2017-12-19 PROCEDURE — 86900 BLOOD TYPING SEROLOGIC ABO: CPT

## 2017-12-19 PROCEDURE — 3600000005 HC SURGERY LEVEL 5 BASE: Performed by: ORTHOPAEDIC SURGERY

## 2017-12-19 PROCEDURE — 3700000001 HC ADD 15 MINUTES (ANESTHESIA): Performed by: ORTHOPAEDIC SURGERY

## 2017-12-19 PROCEDURE — C1776 JOINT DEVICE (IMPLANTABLE): HCPCS | Performed by: ORTHOPAEDIC SURGERY

## 2017-12-19 PROCEDURE — 7100000000 HC PACU RECOVERY - FIRST 15 MIN: Performed by: ORTHOPAEDIC SURGERY

## 2017-12-19 PROCEDURE — 2500000003 HC RX 250 WO HCPCS: Performed by: SPECIALIST

## 2017-12-19 DEVICE — JOURNEY II BCS XLPE ARTICULAR                                    INSERT SIZE 3-4 RIGHT 11MM
Type: IMPLANTABLE DEVICE | Site: KNEE | Status: FUNCTIONAL
Brand: JOURNEY

## 2017-12-19 DEVICE — CEMENT BNE 40GM FULL DOSE PMMA W/O ANTIBIO HI VISC N RADPQ: Type: IMPLANTABLE DEVICE | Site: KNEE | Status: FUNCTIONAL

## 2017-12-19 DEVICE — JOURNEY TIBIAL BASEPLATE NONPOROUS                                    RIGHT SIZE 3
Type: IMPLANTABLE DEVICE | Site: KNEE | Status: FUNCTIONAL
Brand: JOURNEY

## 2017-12-19 DEVICE — GENESIS II OVAL RESURFACING                                    PATELLAR 32MM
Type: IMPLANTABLE DEVICE | Site: KNEE | Status: FUNCTIONAL
Brand: GENESIS II

## 2017-12-19 DEVICE — JOURNEY II BCS FEMORAL OXINIUM                                    RIGHT SIZE 4
Type: IMPLANTABLE DEVICE | Site: KNEE | Status: FUNCTIONAL
Brand: JOURNEY

## 2017-12-19 RX ORDER — HEPARIN SODIUM 10000 [USP'U]/ML
INJECTION, SOLUTION INTRAVENOUS; SUBCUTANEOUS PRN
Status: DISCONTINUED | OUTPATIENT
Start: 2017-12-19 | End: 2017-12-19 | Stop reason: HOSPADM

## 2017-12-19 RX ORDER — HYDROCODONE BITARTRATE AND ACETAMINOPHEN 5; 325 MG/1; MG/1
1 TABLET ORAL EVERY 4 HOURS PRN
Status: DISCONTINUED | OUTPATIENT
Start: 2017-12-19 | End: 2017-12-19 | Stop reason: HOSPADM

## 2017-12-19 RX ORDER — HYDRALAZINE HYDROCHLORIDE 20 MG/ML
5 INJECTION INTRAMUSCULAR; INTRAVENOUS EVERY 10 MIN PRN
Status: DISCONTINUED | OUTPATIENT
Start: 2017-12-19 | End: 2017-12-19 | Stop reason: HOSPADM

## 2017-12-19 RX ORDER — PROMETHAZINE HYDROCHLORIDE 25 MG/ML
6.25 INJECTION, SOLUTION INTRAMUSCULAR; INTRAVENOUS EVERY 5 MIN PRN
Status: DISCONTINUED | OUTPATIENT
Start: 2017-12-19 | End: 2017-12-19 | Stop reason: HOSPADM

## 2017-12-19 RX ORDER — OXYCODONE HYDROCHLORIDE AND ACETAMINOPHEN 5; 325 MG/1; MG/1
1 TABLET ORAL EVERY 4 HOURS PRN
Status: DISCONTINUED | OUTPATIENT
Start: 2017-12-19 | End: 2017-12-21 | Stop reason: HOSPADM

## 2017-12-19 RX ORDER — LABETALOL HYDROCHLORIDE 5 MG/ML
5 INJECTION, SOLUTION INTRAVENOUS EVERY 10 MIN PRN
Status: DISCONTINUED | OUTPATIENT
Start: 2017-12-19 | End: 2017-12-19 | Stop reason: HOSPADM

## 2017-12-19 RX ORDER — MIDAZOLAM HYDROCHLORIDE 1 MG/ML
2 INJECTION INTRAMUSCULAR; INTRAVENOUS ONCE
Status: COMPLETED | OUTPATIENT
Start: 2017-12-19 | End: 2017-12-19

## 2017-12-19 RX ORDER — GABAPENTIN 300 MG/1
300 CAPSULE ORAL ONCE
Status: COMPLETED | OUTPATIENT
Start: 2017-12-19 | End: 2017-12-19

## 2017-12-19 RX ORDER — DOCUSATE SODIUM 100 MG/1
100 CAPSULE, LIQUID FILLED ORAL 2 TIMES DAILY
Status: DISCONTINUED | OUTPATIENT
Start: 2017-12-19 | End: 2017-12-21 | Stop reason: HOSPADM

## 2017-12-19 RX ORDER — SODIUM CHLORIDE 9 MG/ML
INJECTION, SOLUTION INTRAVENOUS CONTINUOUS
Status: DISCONTINUED | OUTPATIENT
Start: 2017-12-19 | End: 2017-12-19

## 2017-12-19 RX ORDER — SODIUM CHLORIDE 0.9 % (FLUSH) 0.9 %
10 SYRINGE (ML) INJECTION EVERY 12 HOURS SCHEDULED
Status: DISCONTINUED | OUTPATIENT
Start: 2017-12-19 | End: 2017-12-19 | Stop reason: HOSPADM

## 2017-12-19 RX ORDER — SODIUM CHLORIDE 0.9 % (FLUSH) 0.9 %
10 SYRINGE (ML) INJECTION EVERY 12 HOURS SCHEDULED
Status: DISCONTINUED | OUTPATIENT
Start: 2017-12-19 | End: 2017-12-21 | Stop reason: HOSPADM

## 2017-12-19 RX ORDER — VANCOMYCIN HYDROCHLORIDE 1 G/200ML
1000 INJECTION, SOLUTION INTRAVENOUS ONCE
Status: COMPLETED | OUTPATIENT
Start: 2017-12-19 | End: 2017-12-19

## 2017-12-19 RX ORDER — SODIUM CHLORIDE 0.9 % (FLUSH) 0.9 %
10 SYRINGE (ML) INJECTION PRN
Status: DISCONTINUED | OUTPATIENT
Start: 2017-12-19 | End: 2017-12-19 | Stop reason: HOSPADM

## 2017-12-19 RX ORDER — FENTANYL CITRATE 50 UG/ML
50 INJECTION, SOLUTION INTRAMUSCULAR; INTRAVENOUS EVERY 5 MIN PRN
Status: COMPLETED | OUTPATIENT
Start: 2017-12-19 | End: 2017-12-19

## 2017-12-19 RX ORDER — SODIUM CHLORIDE 0.9 % (FLUSH) 0.9 %
10 SYRINGE (ML) INJECTION PRN
Status: DISCONTINUED | OUTPATIENT
Start: 2017-12-19 | End: 2017-12-21 | Stop reason: HOSPADM

## 2017-12-19 RX ORDER — FENTANYL CITRATE 50 UG/ML
25 INJECTION, SOLUTION INTRAMUSCULAR; INTRAVENOUS
Status: DISCONTINUED | OUTPATIENT
Start: 2017-12-19 | End: 2017-12-21 | Stop reason: HOSPADM

## 2017-12-19 RX ORDER — FENTANYL CITRATE 50 UG/ML
50 INJECTION, SOLUTION INTRAMUSCULAR; INTRAVENOUS
Status: DISCONTINUED | OUTPATIENT
Start: 2017-12-19 | End: 2017-12-21 | Stop reason: HOSPADM

## 2017-12-19 RX ORDER — OXYCODONE HYDROCHLORIDE AND ACETAMINOPHEN 5; 325 MG/1; MG/1
2 TABLET ORAL EVERY 4 HOURS PRN
Status: DISCONTINUED | OUTPATIENT
Start: 2017-12-19 | End: 2017-12-21 | Stop reason: HOSPADM

## 2017-12-19 RX ORDER — ROCURONIUM BROMIDE 10 MG/ML
INJECTION, SOLUTION INTRAVENOUS PRN
Status: DISCONTINUED | OUTPATIENT
Start: 2017-12-19 | End: 2017-12-19 | Stop reason: SDUPTHER

## 2017-12-19 RX ORDER — FENTANYL CITRATE 50 UG/ML
25 INJECTION, SOLUTION INTRAMUSCULAR; INTRAVENOUS EVERY 5 MIN PRN
Status: COMPLETED | OUTPATIENT
Start: 2017-12-19 | End: 2017-12-19

## 2017-12-19 RX ORDER — ACETAMINOPHEN 500 MG
1000 TABLET ORAL ONCE
Status: COMPLETED | OUTPATIENT
Start: 2017-12-19 | End: 2017-12-19

## 2017-12-19 RX ORDER — LISINOPRIL AND HYDROCHLOROTHIAZIDE 25; 20 MG/1; MG/1
1 TABLET ORAL EVERY EVENING
Status: DISCONTINUED | OUTPATIENT
Start: 2017-12-19 | End: 2017-12-21 | Stop reason: HOSPADM

## 2017-12-19 RX ORDER — METHYLENE BLUE 10 MG/ML
INJECTION INTRAVENOUS PRN
Status: DISCONTINUED | OUTPATIENT
Start: 2017-12-19 | End: 2017-12-19 | Stop reason: HOSPADM

## 2017-12-19 RX ORDER — MEPERIDINE HYDROCHLORIDE 25 MG/ML
12.5 INJECTION INTRAMUSCULAR; INTRAVENOUS; SUBCUTANEOUS EVERY 5 MIN PRN
Status: DISCONTINUED | OUTPATIENT
Start: 2017-12-19 | End: 2017-12-19 | Stop reason: HOSPADM

## 2017-12-19 RX ORDER — MIDAZOLAM HYDROCHLORIDE 1 MG/ML
2 INJECTION INTRAMUSCULAR; INTRAVENOUS ONCE
Status: DISCONTINUED | OUTPATIENT
Start: 2017-12-19 | End: 2017-12-19

## 2017-12-19 RX ORDER — MORPHINE SULFATE 2 MG/ML
2 INJECTION, SOLUTION INTRAMUSCULAR; INTRAVENOUS EVERY 5 MIN PRN
Status: DISCONTINUED | OUTPATIENT
Start: 2017-12-19 | End: 2017-12-19 | Stop reason: HOSPADM

## 2017-12-19 RX ORDER — SODIUM CHLORIDE 9 MG/ML
INJECTION, SOLUTION INTRAVENOUS CONTINUOUS
Status: DISCONTINUED | OUTPATIENT
Start: 2017-12-19 | End: 2017-12-21 | Stop reason: HOSPADM

## 2017-12-19 RX ORDER — LEVOTHYROXINE SODIUM 0.05 MG/1
50 TABLET ORAL DAILY
Status: DISCONTINUED | OUTPATIENT
Start: 2017-12-19 | End: 2017-12-21 | Stop reason: HOSPADM

## 2017-12-19 RX ORDER — CELECOXIB 200 MG/1
200 CAPSULE ORAL ONCE
Status: COMPLETED | OUTPATIENT
Start: 2017-12-19 | End: 2017-12-19

## 2017-12-19 RX ORDER — SODIUM CHLORIDE, SODIUM LACTATE, POTASSIUM CHLORIDE, CALCIUM CHLORIDE 600; 310; 30; 20 MG/100ML; MG/100ML; MG/100ML; MG/100ML
INJECTION, SOLUTION INTRAVENOUS CONTINUOUS
Status: DISCONTINUED | OUTPATIENT
Start: 2017-12-19 | End: 2017-12-19

## 2017-12-19 RX ORDER — VANCOMYCIN HYDROCHLORIDE 1 G/200ML
1000 INJECTION, SOLUTION INTRAVENOUS EVERY 12 HOURS
Status: COMPLETED | OUTPATIENT
Start: 2017-12-19 | End: 2017-12-20

## 2017-12-19 RX ORDER — LIDOCAINE HYDROCHLORIDE 20 MG/ML
INJECTION, SOLUTION EPIDURAL; INFILTRATION; INTRACAUDAL; PERINEURAL PRN
Status: DISCONTINUED | OUTPATIENT
Start: 2017-12-19 | End: 2017-12-19 | Stop reason: SDUPTHER

## 2017-12-19 RX ORDER — HYDROMORPHONE HCL 110MG/55ML
0.25 PATIENT CONTROLLED ANALGESIA SYRINGE INTRAVENOUS EVERY 5 MIN PRN
Status: DISCONTINUED | OUTPATIENT
Start: 2017-12-19 | End: 2017-12-19 | Stop reason: HOSPADM

## 2017-12-19 RX ORDER — AMLODIPINE BESYLATE 10 MG/1
10 TABLET ORAL DAILY
Status: DISCONTINUED | OUTPATIENT
Start: 2017-12-19 | End: 2017-12-21 | Stop reason: HOSPADM

## 2017-12-19 RX ORDER — ACETAMINOPHEN 325 MG/1
650 TABLET ORAL EVERY 4 HOURS PRN
Status: DISCONTINUED | OUTPATIENT
Start: 2017-12-19 | End: 2017-12-21 | Stop reason: HOSPADM

## 2017-12-19 RX ORDER — ATORVASTATIN CALCIUM 40 MG/1
40 TABLET, FILM COATED ORAL NIGHTLY
Status: DISCONTINUED | OUTPATIENT
Start: 2017-12-19 | End: 2017-12-21 | Stop reason: HOSPADM

## 2017-12-19 RX ORDER — GLYCOPYRROLATE 0.2 MG/ML
INJECTION INTRAMUSCULAR; INTRAVENOUS PRN
Status: DISCONTINUED | OUTPATIENT
Start: 2017-12-19 | End: 2017-12-19 | Stop reason: SDUPTHER

## 2017-12-19 RX ORDER — TRANEXAMIC ACID 100 MG/ML
INJECTION, SOLUTION INTRAVENOUS PRN
Status: DISCONTINUED | OUTPATIENT
Start: 2017-12-19 | End: 2017-12-19 | Stop reason: SDUPTHER

## 2017-12-19 RX ORDER — CALCIUM CHLORIDE 100 MG/ML
INJECTION INTRAVENOUS; INTRAVENTRICULAR PRN
Status: DISCONTINUED | OUTPATIENT
Start: 2017-12-19 | End: 2017-12-19 | Stop reason: HOSPADM

## 2017-12-19 RX ORDER — FENTANYL CITRATE 50 UG/ML
25 INJECTION, SOLUTION INTRAMUSCULAR; INTRAVENOUS EVERY 5 MIN PRN
Status: DISCONTINUED | OUTPATIENT
Start: 2017-12-19 | End: 2017-12-21 | Stop reason: HOSPADM

## 2017-12-19 RX ORDER — OXYBUTYNIN CHLORIDE 10 MG/1
1 TABLET, EXTENDED RELEASE ORAL DAILY
Status: DISCONTINUED | OUTPATIENT
Start: 2017-12-19 | End: 2017-12-21 | Stop reason: HOSPADM

## 2017-12-19 RX ORDER — BISACODYL 10 MG
10 SUPPOSITORY, RECTAL RECTAL DAILY PRN
Status: DISCONTINUED | OUTPATIENT
Start: 2017-12-19 | End: 2017-12-21 | Stop reason: HOSPADM

## 2017-12-19 RX ORDER — ONDANSETRON 2 MG/ML
4 INJECTION INTRAMUSCULAR; INTRAVENOUS EVERY 6 HOURS PRN
Status: DISCONTINUED | OUTPATIENT
Start: 2017-12-19 | End: 2017-12-21 | Stop reason: HOSPADM

## 2017-12-19 RX ORDER — HYDROMORPHONE HCL 110MG/55ML
0.5 PATIENT CONTROLLED ANALGESIA SYRINGE INTRAVENOUS EVERY 5 MIN PRN
Status: DISCONTINUED | OUTPATIENT
Start: 2017-12-19 | End: 2017-12-19 | Stop reason: HOSPADM

## 2017-12-19 RX ORDER — ONDANSETRON 2 MG/ML
4 INJECTION INTRAMUSCULAR; INTRAVENOUS
Status: COMPLETED | OUTPATIENT
Start: 2017-12-19 | End: 2017-12-19

## 2017-12-19 RX ORDER — PROPOFOL 10 MG/ML
INJECTION, EMULSION INTRAVENOUS PRN
Status: DISCONTINUED | OUTPATIENT
Start: 2017-12-19 | End: 2017-12-19 | Stop reason: SDUPTHER

## 2017-12-19 RX ORDER — LIDOCAINE HYDROCHLORIDE 10 MG/ML
1 INJECTION, SOLUTION EPIDURAL; INFILTRATION; INTRACAUDAL; PERINEURAL
Status: DISCONTINUED | OUTPATIENT
Start: 2017-12-19 | End: 2017-12-19 | Stop reason: HOSPADM

## 2017-12-19 RX ORDER — DIPHENHYDRAMINE HYDROCHLORIDE 50 MG/ML
12.5 INJECTION INTRAMUSCULAR; INTRAVENOUS
Status: DISCONTINUED | OUTPATIENT
Start: 2017-12-19 | End: 2017-12-19 | Stop reason: HOSPADM

## 2017-12-19 RX ADMIN — DOCUSATE SODIUM 100 MG: 100 CAPSULE, LIQUID FILLED ORAL at 20:55

## 2017-12-19 RX ADMIN — SODIUM CHLORIDE: 9 INJECTION, SOLUTION INTRAVENOUS at 20:48

## 2017-12-19 RX ADMIN — SODIUM CHLORIDE, POTASSIUM CHLORIDE, SODIUM LACTATE AND CALCIUM CHLORIDE: 600; 310; 30; 20 INJECTION, SOLUTION INTRAVENOUS at 14:32

## 2017-12-19 RX ADMIN — SODIUM CHLORIDE, POTASSIUM CHLORIDE, SODIUM LACTATE AND CALCIUM CHLORIDE: 600; 310; 30; 20 INJECTION, SOLUTION INTRAVENOUS at 11:15

## 2017-12-19 RX ADMIN — ROCURONIUM BROMIDE 40 MG: 10 INJECTION INTRAVENOUS at 11:20

## 2017-12-19 RX ADMIN — FENTANYL CITRATE 50 MCG: 50 INJECTION INTRAMUSCULAR; INTRAVENOUS at 14:27

## 2017-12-19 RX ADMIN — Medication 500 ML: at 14:11

## 2017-12-19 RX ADMIN — CELECOXIB 200 MG: 200 CAPSULE ORAL at 10:33

## 2017-12-19 RX ADMIN — NEOSTIGMINE METHYLSULFATE 3 MG: 1 INJECTION INTRAMUSCULAR; INTRAVENOUS; SUBCUTANEOUS at 13:31

## 2017-12-19 RX ADMIN — PHENYLEPHRINE HYDROCHLORIDE 50 MCG: 10 INJECTION INTRAVENOUS at 11:45

## 2017-12-19 RX ADMIN — FENTANYL CITRATE 25 MCG: 50 INJECTION INTRAMUSCULAR; INTRAVENOUS at 16:55

## 2017-12-19 RX ADMIN — LISINOPRIL AND HYDROCHLOROTHIAZIDE 1 TABLET: 25; 20 TABLET ORAL at 20:48

## 2017-12-19 RX ADMIN — FENTANYL CITRATE 50 MCG: 50 INJECTION INTRAMUSCULAR; INTRAVENOUS at 14:50

## 2017-12-19 RX ADMIN — Medication 0.3 MG: at 11:36

## 2017-12-19 RX ADMIN — ACETAMINOPHEN 1000 MG: 500 TABLET ORAL at 10:33

## 2017-12-19 RX ADMIN — SODIUM CHLORIDE, POTASSIUM CHLORIDE, SODIUM LACTATE AND CALCIUM CHLORIDE: 600; 310; 30; 20 INJECTION, SOLUTION INTRAVENOUS at 09:35

## 2017-12-19 RX ADMIN — PROPOFOL 120 MG: 10 INJECTION, EMULSION INTRAVENOUS at 11:20

## 2017-12-19 RX ADMIN — ATORVASTATIN CALCIUM 40 MG: 40 TABLET, FILM COATED ORAL at 20:48

## 2017-12-19 RX ADMIN — FENTANYL CITRATE 25 MCG: 50 INJECTION INTRAMUSCULAR; INTRAVENOUS at 11:55

## 2017-12-19 RX ADMIN — TRANEXAMIC ACID 1000 MG: 100 INJECTION, SOLUTION INTRAVENOUS at 11:40

## 2017-12-19 RX ADMIN — FENTANYL CITRATE 50 MCG: 50 INJECTION INTRAMUSCULAR; INTRAVENOUS at 14:36

## 2017-12-19 RX ADMIN — LIDOCAINE HYDROCHLORIDE 100 MG: 20 INJECTION, SOLUTION EPIDURAL; INFILTRATION; INTRACAUDAL; PERINEURAL at 11:20

## 2017-12-19 RX ADMIN — PHENYLEPHRINE HYDROCHLORIDE 200 MCG: 10 INJECTION INTRAVENOUS at 11:34

## 2017-12-19 RX ADMIN — PHENYLEPHRINE HYDROCHLORIDE 150 MCG: 10 INJECTION INTRAVENOUS at 13:10

## 2017-12-19 RX ADMIN — GABAPENTIN 300 MG: 300 CAPSULE ORAL at 10:33

## 2017-12-19 RX ADMIN — MIDAZOLAM 2 MG: 1 INJECTION INTRAMUSCULAR; INTRAVENOUS at 10:00

## 2017-12-19 RX ADMIN — Medication 0.4 MG: at 13:31

## 2017-12-19 RX ADMIN — MIDAZOLAM 2 MG: 1 INJECTION INTRAMUSCULAR; INTRAVENOUS at 09:49

## 2017-12-19 RX ADMIN — FENTANYL CITRATE 25 MCG: 50 INJECTION INTRAMUSCULAR; INTRAVENOUS at 11:50

## 2017-12-19 RX ADMIN — FENTANYL CITRATE 50 MCG: 50 INJECTION INTRAMUSCULAR; INTRAVENOUS at 15:15

## 2017-12-19 RX ADMIN — SODIUM CHLORIDE, POTASSIUM CHLORIDE, SODIUM LACTATE AND CALCIUM CHLORIDE: 600; 310; 30; 20 INJECTION, SOLUTION INTRAVENOUS at 12:00

## 2017-12-19 RX ADMIN — FENTANYL CITRATE 50 MCG: 50 INJECTION INTRAMUSCULAR; INTRAVENOUS at 11:20

## 2017-12-19 RX ADMIN — ONDANSETRON 4 MG: 2 INJECTION, SOLUTION INTRAMUSCULAR; INTRAVENOUS at 12:58

## 2017-12-19 RX ADMIN — VANCOMYCIN HYDROCHLORIDE 1 G: 1 INJECTION, SOLUTION INTRAVENOUS at 11:26

## 2017-12-19 ASSESSMENT — PULMONARY FUNCTION TESTS
PIF_VALUE: 17
PIF_VALUE: 15
PIF_VALUE: 1
PIF_VALUE: 17
PIF_VALUE: 16
PIF_VALUE: 15
PIF_VALUE: 16
PIF_VALUE: 14
PIF_VALUE: 17
PIF_VALUE: 16
PIF_VALUE: 16
PIF_VALUE: 15
PIF_VALUE: 18
PIF_VALUE: 15
PIF_VALUE: 17
PIF_VALUE: 15
PIF_VALUE: 2
PIF_VALUE: 18
PIF_VALUE: 16
PIF_VALUE: 17
PIF_VALUE: 26
PIF_VALUE: 17
PIF_VALUE: 1
PIF_VALUE: 17
PIF_VALUE: 15
PIF_VALUE: 17
PIF_VALUE: 18
PIF_VALUE: 2
PIF_VALUE: 17
PIF_VALUE: 17
PIF_VALUE: 18
PIF_VALUE: 19
PIF_VALUE: 17
PIF_VALUE: 16
PIF_VALUE: 17
PIF_VALUE: 17
PIF_VALUE: 2
PIF_VALUE: 1
PIF_VALUE: 18
PIF_VALUE: 17
PIF_VALUE: 16
PIF_VALUE: 17
PIF_VALUE: 17
PIF_VALUE: 16
PIF_VALUE: 16
PIF_VALUE: 17
PIF_VALUE: 3
PIF_VALUE: 16
PIF_VALUE: 17
PIF_VALUE: 2
PIF_VALUE: 15
PIF_VALUE: 17
PIF_VALUE: 14
PIF_VALUE: 17
PIF_VALUE: 2
PIF_VALUE: 15
PIF_VALUE: 16
PIF_VALUE: 0
PIF_VALUE: 15
PIF_VALUE: 0
PIF_VALUE: 16
PIF_VALUE: 16
PIF_VALUE: 2
PIF_VALUE: 7
PIF_VALUE: 2
PIF_VALUE: 2
PIF_VALUE: 18
PIF_VALUE: 1
PIF_VALUE: 2
PIF_VALUE: 17
PIF_VALUE: 17
PIF_VALUE: 16
PIF_VALUE: 14
PIF_VALUE: 17
PIF_VALUE: 17
PIF_VALUE: 18
PIF_VALUE: 16
PIF_VALUE: 16
PIF_VALUE: 2
PIF_VALUE: 15
PIF_VALUE: 15
PIF_VALUE: 16
PIF_VALUE: 17
PIF_VALUE: 17
PIF_VALUE: 16
PIF_VALUE: 18
PIF_VALUE: 18
PIF_VALUE: 16
PIF_VALUE: 17
PIF_VALUE: 16
PIF_VALUE: 17
PIF_VALUE: 16
PIF_VALUE: 15
PIF_VALUE: 15
PIF_VALUE: 17
PIF_VALUE: 17
PIF_VALUE: 15
PIF_VALUE: 17
PIF_VALUE: 2
PIF_VALUE: 16
PIF_VALUE: 17
PIF_VALUE: 19
PIF_VALUE: 2
PIF_VALUE: 6
PIF_VALUE: 2
PIF_VALUE: 16
PIF_VALUE: 1
PIF_VALUE: 16
PIF_VALUE: 1
PIF_VALUE: 16
PIF_VALUE: 16
PIF_VALUE: 17
PIF_VALUE: 15
PIF_VALUE: 2
PIF_VALUE: 15
PIF_VALUE: 17
PIF_VALUE: 16
PIF_VALUE: 1
PIF_VALUE: 17
PIF_VALUE: 14
PIF_VALUE: 16
PIF_VALUE: 17
PIF_VALUE: 17
PIF_VALUE: 15
PIF_VALUE: 19
PIF_VALUE: 17
PIF_VALUE: 16
PIF_VALUE: 17
PIF_VALUE: 16
PIF_VALUE: 16
PIF_VALUE: 17
PIF_VALUE: 16
PIF_VALUE: 18
PIF_VALUE: 17
PIF_VALUE: 17
PIF_VALUE: 19
PIF_VALUE: 17
PIF_VALUE: 8
PIF_VALUE: 16
PIF_VALUE: 15
PIF_VALUE: 16
PIF_VALUE: 14
PIF_VALUE: 2
PIF_VALUE: 18
PIF_VALUE: 12
PIF_VALUE: 5
PIF_VALUE: 15
PIF_VALUE: 16
PIF_VALUE: 5

## 2017-12-19 ASSESSMENT — PAIN DESCRIPTION - ORIENTATION
ORIENTATION: RIGHT

## 2017-12-19 ASSESSMENT — PAIN DESCRIPTION - PAIN TYPE
TYPE: SURGICAL PAIN

## 2017-12-19 ASSESSMENT — PAIN SCALES - GENERAL
PAINLEVEL_OUTOF10: 5
PAINLEVEL_OUTOF10: 0
PAINLEVEL_OUTOF10: 8
PAINLEVEL_OUTOF10: 5
PAINLEVEL_OUTOF10: 8
PAINLEVEL_OUTOF10: 8
PAINLEVEL_OUTOF10: 5
PAINLEVEL_OUTOF10: 8
PAINLEVEL_OUTOF10: 5
PAINLEVEL_OUTOF10: 8

## 2017-12-19 ASSESSMENT — PAIN DESCRIPTION - LOCATION
LOCATION: KNEE

## 2017-12-19 ASSESSMENT — PAIN - FUNCTIONAL ASSESSMENT: PAIN_FUNCTIONAL_ASSESSMENT: 0-10

## 2017-12-19 ASSESSMENT — PAIN DESCRIPTION - DESCRIPTORS: DESCRIPTORS: ACHING

## 2017-12-19 NOTE — PROGRESS NOTES
Pt is bigeminy on monitor  Pt follows with Dr. Katerina Navarro cardio  Dr. Katerina Navarro aware of pt's bigeminy

## 2017-12-19 NOTE — PLAN OF CARE
Problem: Falls - Risk of  Goal: Absence of falls  Outcome: Ongoing      Problem: ABCDS Injury Assessment  Goal: Absence of physical injury  Outcome: Ongoing      Problem: Infection:  Goal: Will remain free from infection  Will remain free from infection   Outcome: Ongoing      Problem: Safety:  Goal: Free from accidental physical injury  Free from accidental physical injury   Outcome: Ongoing      Problem: Daily Care:  Goal: Daily care needs are met  Daily care needs are met   Outcome: Ongoing      Problem: Pain:  Goal: Patient's pain/discomfort is manageable  Patient's pain/discomfort is manageable   Outcome: Ongoing      Problem: Skin Integrity:  Goal: Skin integrity will stabilize  Skin integrity will stabilize   Outcome: Ongoing      Problem: Discharge Planning:  Goal: Patients continuum of care needs are met  Patients continuum of care needs are met   Outcome: Ongoing      Problem: Discharge Planning:  Goal: Discharged to appropriate level of care  Discharged to appropriate level of care   Outcome: Ongoing      Problem: Mobility - Impaired:  Goal: Mobility will improve  Mobility will improve   Outcome: Ongoing      Problem: Infection - Surgical Site:  Goal: Will show no infection signs and symptoms  Will show no infection signs and symptoms   Outcome: Ongoing      Problem: Pain - Acute:  Goal: Pain level will decrease  Pain level will decrease   Outcome: Ongoing

## 2017-12-19 NOTE — H&P
extended release tablet TAKE 1 TABLET DAILY (Patient taking differently: TAKE 1 TABLET DAILY in the morning) 90 tablet 3    Multiple Vitamins-Minerals (MULTIVITAMIN PO) Take 2 tablets by mouth every morning       mupirocin (BACTROBAN NASAL) 2 % nasal ointment Take by Nasal route 2 times daily for 5 days 1 Tube 0            Prior to Admission medications    Medication Sig Start Date End Date Taking? Authorizing Provider   atorvastatin (LIPITOR) 40 MG tablet Take 1 tablet by mouth daily 11/27/17  Yes Neto Edwards DO   metoprolol tartrate (LOPRESSOR) 25 MG tablet Take 1 tablet by mouth 2 times daily 11/27/17  Yes Neto Edwards DO   lisinopril-hydrochlorothiazide (PRINZIDE;ZESTORETIC) 20-25 MG per tablet Take 1 tablet by mouth every evening  6/16/17  Yes Historical Provider, MD   amLODIPine (NORVASC) 10 MG tablet TAKE 1 TABLET DAILY  Patient taking differently: TAKE 1 TABLET DAILY in the morning 6/15/17  Yes Azalia Ortiz MD   levothyroxine (SYNTHROID) 50 MCG tablet TAKE 1 TABLET DAILY  Patient taking differently: TAKE 1 TABLET DAILY in the morning 6/12/17  Yes Azalia Ortiz MD   oxybutynin (DITROPAN-XL) 10 MG extended release tablet TAKE 1 TABLET DAILY  Patient taking differently: TAKE 1 TABLET DAILY in the morning 2/8/17  Yes Azalia Ortiz MD   Multiple Vitamins-Minerals (MULTIVITAMIN PO) Take 2 tablets by mouth every morning    Yes Historical Provider, MD   FLUAD 0.5 ML JARED inject 0.5 milliliter intramuscularly 11/13/17   Historical Provider, MD perezpirocin (BACTROBAN NASAL) 2 % nasal ointment Take by Nasal route 2 times daily for 5 days 8/4/17 8/8/17  Jose Wolf DO       This is a 74 y.o. female who is pleasant, cooperative, alert and oriented x3, in no acute distress. Heart: Heart sounds are normal.  VS monitor denoted a 40 HR   Apical pulse 84 Irregular rhythm with bigeminal extra beats. Monitor showed bigeminal PVC's without symptoms   No murmur, gallop or rub.    Lungs: Equal expansion, SURGICAL HISTORY Right 10/2/15     right L4 TFE    TONSILLECTOMY                Family History:  Family History          Family History   Problem Relation Age of Onset    Other Mother         Knee pain            Social History:      Social History              Tobacco History              Smoking Status  Former Smoker Quit date  5/25/1960 Smoking Frequency  0.5 packs/day for 6 years (3 pk yrs) Smoking Tobacco Type  Cigarettes           Smokeless Tobacco Use  Never Used           Tobacco Comment  quit 35-40 years ago                   Alcohol History           Alcohol Use Status  No                   Drug Use           Drug Use Status  No                   Sexual Activity           Sexually Active  Not Asked                      REVIEW OF SYSTEMS:    · Constitutional: there has been no unanticipated weight loss. There's been No change in energy level, No change in activity level. · Eyes: No visual changes or diplopia. No scleral icterus. · ENT: No Headaches, hearing loss or vertigo. No mouth sores or sore throat. · Cardiovascular: No chest pain, no dyspnea, no chf like symptoms  · Respiratory: No previous pulmonary problems  · Gastrointestinal: No abdominal pain, appetite loss, blood in stools. No change in bowel or bladder habits. · Genitourinary: No dysuria, trouble voiding, or hematuria. · Musculoskeletal:  See hpi  · Integumentary: No rash or pruritis. · Neurological: No headache, diplopia, change in muscle strength, numbness or tingling. No change in gait, balance, coordination, mood, affect, memory, mentation, behavior. · Psychiatric: No new anxiety or depression. · Endocrine: No temperature intolerance. No excessive thirst, fluid intake, or urination. No tremor. · Hematologic/Lymphatic: No abnormal bruising or bleeding, blood clots or swollen lymph nodes.   · Allergic/Immunologic: No nasal congestion or hives.     Medications:  Current Facility-Administered Medications          Current Outpatient Prescriptions   Medication Sig Dispense Refill    ethambutol (MYAMBUTOL) 400 MG tablet Take 2 tablets by mouth daily (Patient taking differently: Take 800 mg by mouth every evening ) 28 tablet 0    azithromycin (ZITHROMAX) 500 MG tablet Take 1 tablet by mouth daily (Patient taking differently: Take 500 mg by mouth every evening ) 14 tablet 0    lisinopril-hydrochlorothiazide (PRINZIDE;ZESTORETIC) 20-25 MG per tablet Take 1 tablet by mouth every evening         amLODIPine (NORVASC) 10 MG tablet TAKE 1 TABLET DAILY (Patient taking differently: TAKE 1 TABLET DAILY in the morning) 90 tablet 3    levothyroxine (SYNTHROID) 50 MCG tablet TAKE 1 TABLET DAILY (Patient taking differently: TAKE 1 TABLET DAILY in the morning) 90 tablet 3    simvastatin (ZOCOR) 20 MG tablet TAKE 1 TABLET DAILY (Patient taking differently: TAKE 1 TABLET DAILY in the evening) 90 tablet 3    oxybutynin (DITROPAN-XL) 10 MG extended release tablet TAKE 1 TABLET DAILY (Patient taking differently: TAKE 1 TABLET DAILY in the morning) 90 tablet 3    rifampin (RIFADIN) 300 MG capsule TAKE 1 CAPSULE DAILY (Patient taking differently: TAKE 1 CAPSULE DAILY in the morning) 90 capsule 3    Multiple Vitamins-Minerals (MULTIVITAMIN PO) Take 2 tablets by mouth every morning         RA ALLERGY MEDICATION 25 MG capsule TAKE AS DIRECTED FOR PROCEDURE   0    FLUAD 0.5 ML JARED inject 0.5 milliliter intramuscularly   0    mupirocin (BACTROBAN NASAL) 2 % nasal ointment Take by Nasal route 2 times daily for 5 days 1 Tube 0      No current facility-administered medications for this visit.             Physical Exam:   Vitals: /60   Pulse 88   Ht 5' 5.51\" (1.664 m)   Wt 150 lb 12.8 oz (68.4 kg)   LMP 08/15/1983 (Exact Date)   BMI 24.70 kg/m²   General appearance: alert and cooperative with exam  HEENT: Head: Normocephalic, no lesions, without obvious abnormality.   Neck: no carotid bruit, no JVD  Lungs: clear to auscultation bilaterally  Heart: irreg testing- if low risk then she can proceed at intermediate risk for her surgery  - add lopressor 25 mg BID for frequent PVCs  - Check 2d Echo for structural disease  - stop zocor, change to lipitor 40  - FLP in 6 months  - follow up in 6 months     Thank you for allowing me to participate in the care of this patient, please do not hesitate to call if you have any questions.     Analilia Tobin, [id number] [facility]  [facility] [employer]  [id number]                         Office Visit on 11/27/2017            Routing History            Detailed Report           Note shared with patient   Progress Notes Info     Author Note Status Last Update User   Analilia Tobin DO Signed Analilia Tobin DO   Last Update Date/Time: 11/27/2017 10:06 AM   Chart Review Routing History     Recipient Method Report Sent By   Maryellen Patterson MD   Phone: [phone]    In Basket Notes Report Philipp Esposito LPN [[id number]]   Sent: 11/27/2017  1:54 PM   Filed: 11/27/2017       Progress Notes  Encounter Date: 11/22/2017  Saji Ladd MD   Pulmonology   Expand All Collapse All    []Hide copied text  Howard Billy  11/22/2017        Howard Billy  presented for follow up for Mycobacterium avium cellular infection, bronchiectasis  Denies any hemoptysis. Sputum is small and occasional cough is minimal.  No weight loss. No fever, chills, no night sweats, no jaundice. No dyspnea. Patient is scheduled next week for knee arthroplasty. Review of Systems -  General ROS: negative for - chills, fatigue, fever or weight loss  ENT ROS: negative for - headaches, oral lesions or sore throat  Cardiovascular ROS: no chest pain , orthopnea or pnd   Gastrointestinal ROS: no abdominal pain, change in bowel habits, or black or bloody stools  Skin - no rash   Neuro - no blurry vision , no loc .  No focal weakness   msk - no jt tenderness or swelling    Vascular - no claudication , rest completed and negative   Lymphatic - complete and negative Hematology - oncology - complete and negative   Allergy immunology - complete and negative    no burning or hematuria               Immunization History   Administered Date(s) Administered    Influenza Virus Vaccine 06/15/2012, 10/14/2013    Influenza, High Dose 12/03/2014, 10/23/2015, 11/23/2016, 11/13/2017    Pneumococcal 13-valent Conjugate ([id number]) 12/03/2014    Pneumococcal Conjugate 7-valent 12/01/2007    Zoster 09/06/2011      PAST MEDICAL HISTORY:   Past Medical History             Diagnosis Date    Bacillus fragilis infection       treated one year Dr Tunde Mckinney Bronchiectasis [facility])      Chronic lower back pain 2/5/2016    Chronic radicular lumbar pain 10/2/2015    Degenerative disc disease      Hyperlipidemia      Hyperreflexic bladder      Hypertension      Hypothyroidism              Family History:   Family History              Problem Relation Age of Onset    Other Mother         Knee pain            SURGICAL HISTORY:   Past Surgical History         Past Surgical History:   Procedure Laterality Date    BREAST SURGERY Left 2012     core biopsy    BRONCHOSCOPY Left 6/19/2012    COLONOSCOPY   9/13     SIS 10 y    HYSTERECTOMY   1983     and BSO    OTHER SURGICAL HISTORY Right 4/21/11     right L4 Transforaminal epidural steroid injection    OTHER SURGICAL HISTORY Right 10/2/15     right L4 TFE    TONSILLECTOMY                         Home Medications           Prior to Admission medications    Medication Sig Start Date End Date Taking?  Authorizing Provider   FLUAD 0.5 ML JARED inject 0.5 milliliter intramuscularly 11/13/17   Yes Historical Provider, MD   ethambutol (MYAMBUTOL) 400 MG tablet Take 2 tablets by mouth daily  Patient taking differently: Take 800 mg by mouth every evening  8/25/17   Yes Denny Ruiz MD   azithromycin (ZITHROMAX) 500 MG tablet Take 1 tablet by mouth daily  Patient taking differently: Take 500 mg by mouth every evening  8/25/17   Yes Denny Ruiz MD - Ventilating all lobes without any , rhonchi, wheezes or dullness. No bronchial breath sounds. Chest expansion equal bilaterally. Basal fibrotic rales unchanged     CVS- S1, S2 regular. No S3 no S4, no murmurs     Abdomen-nontender, nondistended. Bowel sounds are present. No organomegaly     Lower extremity-no edema     Upper extremity-no edema     Neurological-grossly normal cranial nerves. No overt motor deficit            /74 (Site: Right Arm, Position: Sitting, Cuff Size: Large Adult)   Pulse 50   Temp 96.6 °F (35.9 °C) (Tympanic)   Resp 14   Ht 5' 5.5\" (1.664 m)   Wt 151 lb 9.6 oz (68.8 kg)   LMP 08/15/1983 (Exact Date)   SpO2 98%   Breastfeeding? No   BMI 24.84 kg/m²               CT Scans  Reviewed  JVG064% predicted, FVC 99% predicted, ratio 81. Lung volumes and normal diffusion capacity. Normal mild airway resistance increase  Assessment  1. 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 Essential hypertension      3. Aspergillus (HCC)colonization of airways       Continue with present treatment of azithromycin, ethambutol, rifampin for a period of 2 years . was started   In October 2015 expected complete date is 10/17 . But will do ct chest in November 17 review ct with ID prior to dc of anti tubercular meds      Plan:  Patient is low risk from primary standpoint for knee arthroplasty  CT of the chest reviewed shows chronic bronchiectasis changes as expected and at SAMM infection. We'll discuss with ID, guarding continuation of antitubercular medication as she has completed 2 years of treatment.   Return in 3 months ID office was called to aid in getting patient to surgical clearance for surgery     Requested Prescriptions        No prescriptions requested or ordered in this encounter         There are no discontinued medications.     Palma received counseling on the following healthy behaviors: nutrition, exercise and medication adherence     Patient given educational materials : see patient instruction         Discussed use, benefit, and side effects of prescribed medications. Barriers to medication compliance addressed.       All patient questions answered. Pt voiced understanding. Eunice Shah MD           Office Visit on 11/22/2017            Detailed Report           Note shared with patient   Progress Notes Info     Author Note Status Last Update User   Denny Ruiz MD Signed Denny Ruiz MD   Last Update Date/Time: 11/22/2017  5:22 PM   Chart Review Routing History     Routing history could not be found for this note. This is because the note has never been routed or because communication record creation was suppressed.

## 2017-12-19 NOTE — PROGRESS NOTES
1419:  Dr. Sandoval Foy at bedside. Pt responds to commands and states her surgical pain is 8/10. Orders received to treat pt's pain with Fentanyl instead of Dilaudid. Please see orders. Will continue to monitor. 1440:  Dr. Sandoval Foy at bedside. Pt responds to commands and states her surgical pain is still 8/10. Additional orders received for Fentanyl. Please see orders. Will continue to monitor.

## 2017-12-19 NOTE — ANESTHESIA POSTPROCEDURE EVALUATION
Department of Anesthesiology  Postprocedure Note    Patient: Otto Lobo  MRN: [id number]  [place]: 1945  Date of evaluation: 12/19/2017  Time:  5:08 PM     Procedure Summary     Date:  12/19/17 Room / Location:  [facility] OR  / [facility] OR    Anesthesia Start:  1117 Anesthesia Stop:  3189    Procedure:  KNEE TOTAL ARTHROPLASTY RIGHT WITH WOO & NEPHEW  AQUAMANTIS   PLATELET GEL (Right ) Diagnosis:  (DX RIGHT KNEE OA )    Surgeon:  Caitlin Denton DO Responsible Provider:  Yoko Wayne MD    Anesthesia Type:  general, regional ASA Status:  3          Anesthesia Type: general, regional    Lorena Phase I: Lorena Score: 7    Lorena Phase II:      Last vitals: Reviewed and per EMR flowsheets.        Anesthesia Post Evaluation    Patient location during evaluation: PACU  Patient participation: complete - patient participated  Level of consciousness: awake and alert  Airway patency: patent  Nausea & Vomiting: no nausea and no vomiting  Complications: no  Cardiovascular status: hemodynamically stable  Respiratory status: acceptable  Hydration status: euvolemic

## 2017-12-19 NOTE — BRIEF OP NOTE
Brief Postoperative Note  ______________________________________________________________    Patient: Jannie Burnett  YOB: 1945  MRN: [id number]  Date of Procedure: 12/19/2017    Pre-Op Diagnosis: DX RIGHT KNEE OA     Post-Op Diagnosis: Same       Procedure(s):  Right TKA    Anesthesia: General    Surgeon(s):  DO Solange Mathew DO PGY5  Marty Alberto DO PGY2    Staff:  Bebe Scrub: Keith Vargas  Scrub Person First: Leah Cleaning     Estimated Blood Loss: 225     Cell saver: 125    IVF: 1300cc crystalliod    UOP: 738    Complications: None    Specimens:   * No specimens in log *    Implants:    Implant Name Type Inv.  Item Serial No.  Lot No. LRB No. Used   CEMENT HI VISCOSITY SMARTSET 40GR Cement CEMENT HI VISCOSITY SMARTSET 40GR  JNJ: [employer]  Right 1   CEMENT HI VISCOSITY SMARTSET 40GR Cement CEMENT HI VISCOSITY SMARTSET 40GR  JNJ: [employer]  Right 1   IMPL KNEE PATELLA COMP RESURF 32MM Knee IMPL KNEE PATELLA COMP RESURF 32MM  SMITH  [id number] Right 1   IMPL KNEE FEM COM BI CRU JOUR II OXIN SZ 4 RT Knee IMPL KNEE FEM COM BI CRU JOUR II OXIN SZ 4 RT  SMITH  [id number] Right 1   IMPL KNEE TIB BASEPLT JOURNEY NP RT SZ 3 Knee IMPL KNEE TIB BASEPLT JOURNEY NP RT SZ 3  SMITH  [id number] Right 1   IMPL KNEE INSRT JOUR II XLPE AR SZ3-4 RT Knee IMPL KNEE INSRT JOUR II XLPE AR SZ3-4 RT  SMITH  [id number] Right 1         Drains:      Findings: Right knee OA    Marty Alberto DO  Date: 12/19/2017  Time: 1:52 PM

## 2017-12-20 PROBLEM — M17.11 PRIMARY OSTEOARTHRITIS OF RIGHT KNEE: Status: ACTIVE | Noted: 2017-12-20

## 2017-12-20 LAB
ABSOLUTE EOS #: 0.3 K/UL (ref 0–0.4)
ABSOLUTE IMMATURE GRANULOCYTE: ABNORMAL K/UL (ref 0–0.3)
ABSOLUTE LYMPH #: 0.4 K/UL (ref 1–4.8)
ABSOLUTE MONO #: 0.5 K/UL (ref 0.2–0.8)
ANION GAP SERPL CALCULATED.3IONS-SCNC: 10 MMOL/L (ref 9–17)
BASOPHILS # BLD: 0 % (ref 0–2)
BASOPHILS ABSOLUTE: 0 K/UL (ref 0–0.2)
BUN BLDV-MCNC: 17 MG/DL (ref 8–23)
BUN/CREAT BLD: 22 (ref 9–20)
CALCIUM SERPL-MCNC: 8.5 MG/DL (ref 8.6–10.4)
CHLORIDE BLD-SCNC: 103 MMOL/L (ref 98–107)
CO2: 26 MMOL/L (ref 20–31)
CREAT SERPL-MCNC: 0.79 MG/DL (ref 0.5–0.9)
DIFFERENTIAL TYPE: ABNORMAL
EOSINOPHILS RELATIVE PERCENT: 4 % (ref 1–4)
GFR AFRICAN AMERICAN: >60 ML/MIN
GFR NON-AFRICAN AMERICAN: >60 ML/MIN
GFR SERPL CREATININE-BSD FRML MDRD: ABNORMAL ML/MIN/{1.73_M2}
GFR SERPL CREATININE-BSD FRML MDRD: ABNORMAL ML/MIN/{1.73_M2}
GLUCOSE BLD-MCNC: 119 MG/DL (ref 70–99)
HCT VFR BLD CALC: 30.3 % (ref 36–46)
HEMOGLOBIN: 10.3 G/DL (ref 12–16)
IMMATURE GRANULOCYTES: ABNORMAL %
LYMPHOCYTES # BLD: 6 % (ref 24–44)
MCH RBC QN AUTO: 29.6 PG (ref 26–34)
MCHC RBC AUTO-ENTMCNC: 34 G/DL (ref 31–37)
MCV RBC AUTO: 87.1 FL (ref 80–100)
MONOCYTES # BLD: 7 % (ref 1–7)
PDW BLD-RTO: 12.1 % (ref 11.5–14.5)
PLATELET # BLD: 174 K/UL (ref 130–400)
PLATELET ESTIMATE: ABNORMAL
PMV BLD AUTO: ABNORMAL FL (ref 6–12)
POTASSIUM SERPL-SCNC: 4 MMOL/L (ref 3.7–5.3)
RBC # BLD: 3.48 M/UL (ref 4–5.2)
RBC # BLD: ABNORMAL 10*6/UL
SEG NEUTROPHILS: 83 % (ref 36–66)
SEGMENTED NEUTROPHILS ABSOLUTE COUNT: 6.1 K/UL (ref 1.8–7.7)
SODIUM BLD-SCNC: 139 MMOL/L (ref 135–144)
WBC # BLD: 7.3 K/UL (ref 3.5–11)
WBC # BLD: ABNORMAL 10*3/UL

## 2017-12-20 PROCEDURE — 6360000002 HC RX W HCPCS: Performed by: STUDENT IN AN ORGANIZED HEALTH CARE EDUCATION/TRAINING PROGRAM

## 2017-12-20 PROCEDURE — 97530 THERAPEUTIC ACTIVITIES: CPT | Performed by: NURSE PRACTITIONER

## 2017-12-20 PROCEDURE — 36415 COLL VENOUS BLD VENIPUNCTURE: CPT

## 2017-12-20 PROCEDURE — 80048 BASIC METABOLIC PNL TOTAL CA: CPT

## 2017-12-20 PROCEDURE — 97530 THERAPEUTIC ACTIVITIES: CPT

## 2017-12-20 PROCEDURE — 1200000000 HC SEMI PRIVATE

## 2017-12-20 PROCEDURE — 97116 GAIT TRAINING THERAPY: CPT

## 2017-12-20 PROCEDURE — 6370000000 HC RX 637 (ALT 250 FOR IP): Performed by: STUDENT IN AN ORGANIZED HEALTH CARE EDUCATION/TRAINING PROGRAM

## 2017-12-20 PROCEDURE — 97535 SELF CARE MNGMENT TRAINING: CPT | Performed by: NURSE PRACTITIONER

## 2017-12-20 PROCEDURE — G8988 SELF CARE GOAL STATUS: HCPCS

## 2017-12-20 PROCEDURE — G8979 MOBILITY GOAL STATUS: HCPCS

## 2017-12-20 PROCEDURE — 97535 SELF CARE MNGMENT TRAINING: CPT

## 2017-12-20 PROCEDURE — 97110 THERAPEUTIC EXERCISES: CPT

## 2017-12-20 PROCEDURE — 2580000003 HC RX 258: Performed by: STUDENT IN AN ORGANIZED HEALTH CARE EDUCATION/TRAINING PROGRAM

## 2017-12-20 PROCEDURE — 99232 SBSQ HOSP IP/OBS MODERATE 35: CPT | Performed by: INTERNAL MEDICINE

## 2017-12-20 PROCEDURE — G8978 MOBILITY CURRENT STATUS: HCPCS

## 2017-12-20 PROCEDURE — G8987 SELF CARE CURRENT STATUS: HCPCS

## 2017-12-20 PROCEDURE — 85025 COMPLETE CBC W/AUTO DIFF WBC: CPT

## 2017-12-20 PROCEDURE — 97161 PT EVAL LOW COMPLEX 20 MIN: CPT

## 2017-12-20 PROCEDURE — 97165 OT EVAL LOW COMPLEX 30 MIN: CPT

## 2017-12-20 RX ADMIN — METOPROLOL TARTRATE 25 MG: 25 TABLET ORAL at 21:33

## 2017-12-20 RX ADMIN — VANCOMYCIN HYDROCHLORIDE 1000 MG: 1 INJECTION, SOLUTION INTRAVENOUS at 01:10

## 2017-12-20 RX ADMIN — LEVOTHYROXINE SODIUM 50 MCG: 50 TABLET ORAL at 08:13

## 2017-12-20 RX ADMIN — DOCUSATE SODIUM 100 MG: 100 CAPSULE, LIQUID FILLED ORAL at 08:13

## 2017-12-20 RX ADMIN — OXYCODONE HYDROCHLORIDE AND ACETAMINOPHEN 2 TABLET: 5; 325 TABLET ORAL at 17:27

## 2017-12-20 RX ADMIN — SODIUM CHLORIDE: 9 INJECTION, SOLUTION INTRAVENOUS at 07:13

## 2017-12-20 RX ADMIN — Medication 10 ML: at 21:33

## 2017-12-20 RX ADMIN — AMLODIPINE BESYLATE 10 MG: 10 TABLET ORAL at 08:14

## 2017-12-20 RX ADMIN — RIVAROXABAN 10 MG: 10 TABLET, FILM COATED ORAL at 21:33

## 2017-12-20 RX ADMIN — OXYCODONE HYDROCHLORIDE AND ACETAMINOPHEN 2 TABLET: 5; 325 TABLET ORAL at 04:11

## 2017-12-20 RX ADMIN — ATORVASTATIN CALCIUM 40 MG: 40 TABLET, FILM COATED ORAL at 23:11

## 2017-12-20 RX ADMIN — OXYBUTYNIN CHLORIDE 10 MG: 10 TABLET, FILM COATED, EXTENDED RELEASE ORAL at 08:13

## 2017-12-20 RX ADMIN — METOPROLOL TARTRATE 25 MG: 25 TABLET ORAL at 08:14

## 2017-12-20 RX ADMIN — OXYCODONE HYDROCHLORIDE AND ACETAMINOPHEN 2 TABLET: 5; 325 TABLET ORAL at 08:14

## 2017-12-20 RX ADMIN — DOCUSATE SODIUM 100 MG: 100 CAPSULE, LIQUID FILLED ORAL at 21:33

## 2017-12-20 RX ADMIN — OXYCODONE HYDROCHLORIDE AND ACETAMINOPHEN 2 TABLET: 5; 325 TABLET ORAL at 21:32

## 2017-12-20 RX ADMIN — OXYCODONE HYDROCHLORIDE AND ACETAMINOPHEN 2 TABLET: 5; 325 TABLET ORAL at 13:21

## 2017-12-20 ASSESSMENT — PAIN DESCRIPTION - FREQUENCY
FREQUENCY: CONTINUOUS
FREQUENCY: CONTINUOUS

## 2017-12-20 ASSESSMENT — PAIN SCALES - GENERAL
PAINLEVEL_OUTOF10: 2
PAINLEVEL_OUTOF10: 3
PAINLEVEL_OUTOF10: 8
PAINLEVEL_OUTOF10: 7
PAINLEVEL_OUTOF10: 2
PAINLEVEL_OUTOF10: 7
PAINLEVEL_OUTOF10: 5
PAINLEVEL_OUTOF10: 6
PAINLEVEL_OUTOF10: 2

## 2017-12-20 ASSESSMENT — PAIN DESCRIPTION - ORIENTATION
ORIENTATION: RIGHT

## 2017-12-20 ASSESSMENT — PAIN DESCRIPTION - DESCRIPTORS
DESCRIPTORS: ACHING
DESCRIPTORS: ACHING

## 2017-12-20 ASSESSMENT — PAIN DESCRIPTION - ONSET
ONSET: ON-GOING
ONSET: ON-GOING

## 2017-12-20 ASSESSMENT — PAIN DESCRIPTION - PAIN TYPE
TYPE: SURGICAL PAIN

## 2017-12-20 ASSESSMENT — PAIN DESCRIPTION - LOCATION
LOCATION: KNEE
LOCATION: HIP;LEG

## 2017-12-20 NOTE — OP NOTE
[id number] [facility] [address]                 [address] [place] [address]                                 OPERATIVE REPORT    PATIENT NAME: Mejia Palomino                     :        1945  MED REC NO:   [id number]                             ROOM:       2008  ACCOUNT NO:   [de-identified]                           ADMIT DATE: 2017  PROVIDER:     Catherine Gonzales    DATE OF PROCEDURE:  2017    PREOPERATIVE DIAGNOSIS:  Right knee osteoarthritis. POSTOPERATIVE DIAGNOSIS:  Right knee osteoarthritis. OPERATION PERFORMED:  Right total knee arthroplasty with Mariia Aminata & Nephfranki  VISIONAIRE patient-specific cutting guides with Journey II, size 4  posterior stabilized Oxinium femoral implant, size 3 right tibial tray,  size 11 mm posterior stabilized polyethylene, size 32 patella with Chestine Tez, Orthosensor  ligamentous balancing. SURGEON:  Catherine Gonzales DO.    ASSISTANTS:  Angeles Rocha DO, PGY-5; Julio Mane DO, PGY-2. ANESTHESIA:  General.    BLOOD LOSS:  225 mL, Cell Saver 125 mL. IV FLUIDS:  1300 mL. URINE OUTPUT:  175 mL. COMPLICATIONS:  No known. FINDINGS:  Osteoarthritis, medial compartment. DISPOSITION:  To postanesthesia care unit in stable condition. INDICATIONS:  This is a 42-year-old female with longstanding knee pain,  failing conservative treatment to the point, where she is having difficulty  with walking. She understands the risks and benefits of the procedure. Informed consent was signed. Operative site was marked. Preoperative  antibiotics were given. PROCEDURE IN DETAIL:  The patient was taken to the operative suite, placed  supine position on the operating table. General inhalation anesthetic with  endotracheal intubation was performed. Exam under anesthesia revealed a  stable knee throughout range of motion. Well-padded tourniquet was placed  on the right proximal thigh.   Right leg was prepped and draped in normal  sterile fashion. Standard total joint precautions were carried out. Midline incision was made. Standard medial patellar arthrotomy was  performed. Medial release was performed. Medial meniscus were sacrificed. Lateral meniscus was sacrificed. ACL and PCL were sacrificed. Supratrochlear and infrapatellar fat pad were sacrificed. There was severe  osteoarthritic changes of the medial compartment noted. Patella was  everted and sized to size 32 to a depth of 13 mm. Three peg holes were  drilled to re-accommodate the premorbid patellar height. Size 2, 32-mm  patellar protector was placed and slid in the lateral gutter. The knee was  flexed up. Hohmann retractors were placed to protect the collateral  ligaments. Distal femur cutting guide was placed from [employer]  patient-specific guide, pinned in place and checked for alignment. Distal  femoral cut was performed. Rotation pins were marked. The tibia  subluxated anteriorly. PCL retractor was placed. Proximal tibial guide  was placed. Drop-down sofy confirmed alignment. It was pinned in place and  then proximal tibial cut was performed. Rotation holes were marked. Extension gap was rectangular at 11. Knee was flexed up. Z-knee  retractors were in place. A 5-in-1 guide was placed for size 4 femur,  5-in-1 cuts were performed. Tibia subluxed anteriorly. Rotation was  controlled of the medial third of the tibial tubercle. Rotation pins were  utilized. It was pinned in place, broached, and reamed. Trialling with an  11-mm polyethylene got excellent stability throughout range of motion. This was verified with VERASENSE sensor, showing proper tracking and proper  tension throughout range of motion. Tourniquet was elevated. All trials  were removed from the joint. The wounds were copiously irrigated and the  excess meniscus or bony residue was cleaned out the knee. Knee was treated  with Aquamantys.   Standard cementing was performed for cementing the tibial  implant and impacting it, cementing the femoral implant and impacting it,  placing a trial polyethylene and extending the knee, cementing the patella  holding in place with a patellar clamp. Excess cement was removed. Cement  was allowed to harden. A 3 L of irrigation was performed and the excess  cement was meticulously removed with an osteotome. Final polyethylene was  placed and locked in the locking mechanism. The knee was reduced. The  tourniquet was dropped and there was rapid cap refill. Bleeding was  controlled with bipolar electrocautery. The joint was treated with  platelet rich plasma and vancomycin powder. Arthrotomy was closed with #1  Vicryl suture in a figure-of-eight interrupted fashion. Subcutaneous  tissue was closed with 0 Vicryl, 2-0 Vicryl, and 3-0 Monocryl. Aquacel Ag  dressing was placed. The patient was awakened by Department of Anesthesia  and transferred to the postanesthesia care unit in stable condition.         Boom Herbert    D: 12/19/2017 14:25:40       T: 12/20/2017 0:46:51     [email or web address]  Job#: [id number]     Doc#: [id number]    CC:  Jeanette Hamilton

## 2017-12-20 NOTE — PROGRESS NOTES
S: Pt seen and examined. No changes overnight. Pain controlled. Afebrile. Denies nausea, vomiting, CP, SOB, fever, chills, numbness/tingling. Tolerating diet well. O:   Temp (24hrs), Av.2 °F (36.8 °C), Min:97.5 °F (36.4 °C), Max:98.6 °F (37 °C)    Vitals:    17 0444 17 0746 17 1147 17 1527   BP: (!) 113/51 110/68 (!) 91/56 (!) 111/53   Pulse: 80 76 68 70   Resp:     Temp:  98.6 °F (37 °C) 98.4 °F (36.9 °C) 98.4 °F (36.9 °C)   TempSrc:  Oral Oral Oral   SpO2: 97% 96% 93% 97%   Weight:       Height:         Body mass index is 25.13 kg/m².     Recent Labs      17   0553   WBC  7.3   HGB  10.3*   HCT  30.3*   PLT  174       Current Facility-Administered Medications: bupivacaine 0.125% (MARCAINE) in sodium chloride 0.9% infusion 500 mL, 500 mL, Infiltration, Continuous  amLODIPine (NORVASC) tablet 10 mg, 10 mg, Oral, Daily  atorvastatin (LIPITOR) tablet 40 mg, 40 mg, Oral, Nightly  levothyroxine (SYNTHROID) tablet 50 mcg, 50 mcg, Oral, Daily  lisinopril-hydrochlorothiazide (PRINZIDE;ZESTORETIC) 20-25 MG per tablet 1 tablet, 1 tablet, Oral, QPM  metoprolol tartrate (LOPRESSOR) tablet 25 mg, 25 mg, Oral, BID  oxybutynin (DITROPAN-XL) extended release tablet 10 mg, 1 tablet, Oral, Daily  0.9 % sodium chloride infusion, , Intravenous, Continuous  sodium chloride flush 0.9 % injection 10 mL, 10 mL, Intravenous, 2 times per day  sodium chloride flush 0.9 % injection 10 mL, 10 mL, Intravenous, PRN  acetaminophen (TYLENOL) tablet 650 mg, 650 mg, Oral, Q4H PRN  oxyCODONE-acetaminophen (PERCOCET) 5-325 MG per tablet 1 tablet, 1 tablet, Oral, Q4H PRN **OR** oxyCODONE-acetaminophen (PERCOCET) 5-325 MG per tablet 2 tablet, 2 tablet, Oral, Q4H PRN  docusate sodium (COLACE) capsule 100 mg, 100 mg, Oral, BID  magnesium hydroxide (MILK OF MAGNESIA) 400 MG/5ML suspension 30 mL, 30 mL, Oral, Daily PRN  bisacodyl (DULCOLAX) suppository 10 mg, 10 mg, Rectal, Daily PRN  ondansetron (ZOFRAN)

## 2017-12-20 NOTE — CARE COORDINATION
OPPT APPOINTMENT    Made OPPT apppointment for patient at [place] [facility]  Jan 5th, 2018 at 11:00 a.m.     Anh Dewey, MSN, RN, CNL on 12/20/17 at 1:59 PM  Orthopedic Nurse Navigator - [facility]  Phone: [phone] / Fax: [phone]

## 2017-12-20 NOTE — CARE COORDINATION
FAXED Summer Perez 906:   [facility] 2 weeks of [facility] AT [facility] PT     Faxed at:     1:53 PM on 12/20/2017    Joseph Gray, MSN, RN, CNL on 12/20/17 at 1:53 PM  Orthopedic Nurse Navigator - [facility]  Phone: [phone] / Fax: [phone]

## 2017-12-20 NOTE — PROGRESS NOTES
Occupational Therapy   Occupational Therapy Initial Assessment  Date: 2017   Patient Name: Veryl Holter  MRN: [id number]     : 1945  Discharge Recommendation: home OT       RN reports patient is medically stable for therapy treatment this date. Chart reviewed prior to treatment and patient is agreeable for therapy. All lines intact and patient positioned comfortably at end of treatment. All patient needs addressed prior to ending therapy session. Patient Diagnosis(es): There were no encounter diagnoses. has a past medical history of Bacillus fragilis infection; Bronchiectasis ([facility], [place] [address] Chronic lower back pain; Chronic radicular lumbar pain; Degenerative disc disease; Hyperlipidemia; Hyperreflexic bladder; Hypertension; and Hypothyroidism. has a past surgical history that includes Hysterectomy (); bronchoscopy (Left, 2012); other surgical history (Right, 11); Breast surgery (Left, ); Colonoscopy (); Tonsillectomy; other surgical history (Right, 10/2/15); Total knee arthroplasty (Right, 2017); and total knee arthroplasty (Right, 2017). Restrictions  Restrictions/Precautions  Restrictions/Precautions: General Precautions, Fall Risk, Weight Bearing  Lower Extremity Weight Bearing Restrictions  Right Lower Extremity Weight Bearing: Weight Bearing As Tolerated (infublock)    Subjective   General  Chart Reviewed: Yes  Patient assessed for rehabilitation services?: Yes  Family / Caregiver Present: No  Pain Assessment  Patient Currently in Pain: Yes  Pain Assessment: 0-10  Pain Level: 2  Pain Type: Surgical pain  Pain Location: Knee  Pain Orientation: Right  Pain Descriptors: Aching  Pain Frequency: Continuous  Clinical Progression: Not changed  Patient's Stated Pain Goal: 4  Pain Intervention(s):  Other (Comment) (Bolused nerve block)  Oxygen Therapy  SpO2: 93 %  O2 Device: None (Room air)  Social/Functional History  Social/Functional History  Lives assistance  Transfer Comments: cues for walker safety     Cognition  Overall Cognitive Status: WNL  Perception  Overall Perceptual Status: WFL     Sensation  Overall Sensation Status: WFL        LUE AROM (degrees)  LUE AROM : WFL  RUE AROM (degrees)  RUE AROM : WFL  LUE Strength  Gross LUE Strength: WFL (B UE's 4+/5)  RUE Strength  Gross RUE Strength: WFL                  Assessment   Performance deficits / Impairments: Decreased functional mobility ; Decreased ADL status; Decreased strength;Decreased safe awareness;Decreased endurance;Decreased balance  Prognosis: Good  Decision Making: Low Complexity  Patient Education: OT POC, discharge recommendations, safety with transfers/mob, walker  Discharge Recommendations: Home with Home health OT; Home with assist PRN  REQUIRES OT FOLLOW UP: Yes  Activity Tolerance  Activity Tolerance: Treatment limited secondary to medical complications (free text) (BP dropping with sitting and standing)  Safety Devices  Safety Devices in place: Yes  Type of devices: Call light within reach;Nurse notified; Left in bed;Gait belt;Patient at risk for falls        Discharge Recommendations:  Home with Home health OT, Home with assist PRN     Plan   Plan  Times per week: 5x/week, 1-2x/day  Current Treatment Recommendations: Strengthening, Balance Training, Functional Mobility Training, Endurance Training, Self-Care / ADL, Safety Education & Training, Patient/Caregiver Education & Training, Equipment Evaluation, Education, & procurement    G-Code  OT G-codes  Functional Assessment Tool Used: Clarisse Smiley \"6 clicks\" Inpatient Daily activity short form  Score: 16  Functional Limitation: Self care  Self Care Current Status ():  At least 40 percent but less than 60 percent impaired, limited or restricted  Self Care Goal Status (): 0 percent impaired, limited or restricted  OutComes Score                                           AM-PAC Score        AM-PAC Inpatient Daily Activity Raw Score: 16  AM-PAC Inpatient ADL T-Scale Score : 35.96  ADL Inpatient CMS 0-100% Score: 53.32  ADL Inpatient CMS G-Code Modifier : CK    Goals  Short term goals  Time Frame for Short term goals: by discharge, pt will  Short term goal 1: demo S/MI with ADL transfers with good safety   Short term goal 2: demo S/MI with functional mob for ADL completion with good safety  Short term goal 3: demo SBA with toileting routine   Short term goal 4: demo I with UB ADLs and min A LB AdLs with good safety and pacing  Short term goal 5: verb good understanding of EC/WS techs, fall prevention techs, and possible equip needs to use at home   Patient Goals   Patient goals : to go home       Therapy Time   Individual Concurrent Group Co-treatment   Time In 0849         Time Out 0935         Minutes 55              Pt would benefit from skilled home OT services in order to maximize occupational performance, safety, and independence in the home environment.      Brionna Bobby, OT

## 2017-12-20 NOTE — CONSULTS
smoking use included Cigarettes. She has a 3.00 pack-year smoking history. She has never used smokeless tobacco.  Alcohol:      reports that she does not drink alcohol. Drug Use:  reports that she does not use drugs. Family History:   Family History   Problem Relation Age of Onset    Other Mother      Knee pain       REVIEW OF SYSTEMS:  Constitutional: Negative for chills, diaphoresis, fever, malaise/fatigue and weight loss. HENT: Negative for ear pain, hearing loss, nosebleeds, sore throat and tinnitus. Eyes: Negative for blurred vision, double vision, photophobia and pain. Respiratory: Negative for cough, hemoptysis, sputum production, shortness of breath and wheezing. Cardiovascular: Negative for palpitations, orthopnea, claudication, leg swelling and PND. Patient was found to have ectopy on EKG but she is asymptomatic of this. Gastrointestinal: Negative for abdominal pain, blood in stool, constipation, diarrhea, heartburn, melena, nausea and vomiting. Genitourinary: Negative for dysuria, flank pain, frequency, hematuria and urgency. Musculoskeletal: Negative for back pain, falls and neck pain. Positive for knee pain, arthritis and myalgias. Skin: Negative for itching and rash. Neurological: Negative for dizziness, tingling, tremors, sensory change, focal weakness, seizures, weakness and headaches. Endo/Heme/Allergies: Does not bruise/bleed easily. Psychiatric/Behavioral: Negative for depression. The patient is not nervous/anxious.         Code Status:  Full Code    PHYSICAL EXAM:  BP (!) 111/53   Pulse 70   Temp 98.4 °F (36.9 °C) (Oral)   Resp 18   Ht 5' 5\" (1.651 m)   Wt 151 lb (68.5 kg)   LMP 08/15/1983 (Exact Date)   SpO2 97%   BMI 25.13 kg/m²       Intake/Output Summary (Last 24 hours) at 12/20/17 1642  Last data filed at 12/20/17 1024   Gross per 24 hour   Intake              635 ml   Output              800 ml   Net             -165 ml       General Appearance:    Alert, cooperative, no distress, appears stated age   Head:    Normocephalic, without obvious abnormality, atraumatic   Eyes:    PERRL, conjunctiva/corneas clear, EOM's intact        Ears:    Normal external ear canals, both ears   Nose:   Nares normal, septum midline, mucosa normal, no drainage    or sinus tenderness   Throat:   Lips, mucosa, and tongue normal; teeth and gums normal   Neck:   Supple, symmetrical, trachea midline, no adenopathy;        thyroid:  No enlargement/tenderness/nodules; no carotid    bruit or JVD   Back:     Symmetric, no curvature, ROM Not attempted, no CVA tenderness   Lungs:     Clear to auscultation bilaterally, respirations unlabored   Chest wall:    No tenderness or deformity   Heart:    Regular rate and rhythm, S1 and S2 normal, no murmur, rub   or gallop   Abdomen:     Soft, non-tender, bowel sounds active all four quadrants,     no masses, no organomegaly   Extremities:   Extremities normal, atraumatic, no cyanosis or edema   Pulses:   2+ and symmetric all extremities   Skin:   Skin color, texture, turgor normal, no rashes or lesions   Lymph nodes:   Cervical, supraclavicular, and axillary nodes normal   Neurologic:   CNII-XII intact.        DATA:    Hematology:  Recent Labs      12/20/17   0553   WBC  7.3   RBC  3.48*   HGB  10.3*   HCT  30.3*   MCV  87.1   MCH  29.6   MCHC  34.0   RDW  12.1   PLT  174   MPV  NOT REPORTED     Chemistry:  Recent Labs      12/20/17   0553   NA  139   K  4.0   CL  103   CO2  26   GLUCOSE  119*   BUN  17   CREATININE  0.79   ANIONGAP  10   LABGLOM  >60   GFRAA  >60   CALCIUM  8.5*       Current Medications:  Current Facility-Administered Medications   Medication Dose Route Frequency Provider Last Rate Last Dose    bupivacaine 0.125% (MARCAINE) in sodium chloride 0.9% infusion 500 mL  500 mL Infiltration Continuous Kevan Angelucci, MD 8 mL/hr at 12/19/17 1411 500 mL at 12/19/17 1411    amLODIPine (NORVASC) tablet 10 mg  10 mg Oral Daily Jacquie Francois DO   10

## 2017-12-20 NOTE — PROGRESS NOTES
Patient dangled at the side of the bed. Then stood for a minute. Patient walked to her room door then turned around and went back to bed. She tolerated it well. We used a top sheet to move her leg in and out of bed. Pain medication after to control pain.

## 2017-12-20 NOTE — PROGRESS NOTES
Physical Therapy  DATE: 2017    NAME: Shashank Matt  MRN: [id number]   : 1945  Discharge Recommendation:   Home with assist PRN, Home with Home health PT, Outpatient PT       17 1435   Restrictions/Precautions   Restrictions/Precautions General Precautions; Fall Risk;Weight Bearing   Lower Extremity Weight Bearing Restrictions   Right Lower Extremity Weight Bearing Weight Bearing As Tolerated   General   Chart Reviewed Yes   Pain Screening   Patient Currently in Pain Yes   Pain Assessment   Pain Assessment 0-10   Pain Level 5   Pain Location Knee   Pain Orientation Right   Bed Mobility   Rolling Minimal assistance   Supine to Sit Minimal assistance   Sit to Supine Minimal assistance   Scooting Contact guard assistance   Transfers   Sit to Stand Contact guard assistance   Stand to sit Contact guard assistance   Stand Pivot Transfers Contact guard assistance   Ambulation   Ambulation? Yes   Ambulation 1   Surface level tile   Device Rolling Walker   Assistance Contact guard assistance   Quality of Gait Good pattern   Distance 110 ft   Exercises   Comments TKA ex per protocol x 15 reps   Other Activities   Comment assisted pt to bathroom for toileting   Short term goals   Short term goal 1 Independent bed mobility/transfers   Short term goal 2 Independent ambulation w/ RW FWB R ' x 1   Short term goal 3 ROM/strengthening R LE per post op protocol   Short term goal 4 Independently ascend/descend 2 steps without HR   Conditions Requiring Skilled Therapeutic Intervention   Body structures, Functions, Activity limitations Decreased functional mobility ; Decreased ROM; Decreased strength   Assessment pt able to increase amb. distance without dizziness    Discharge Recommendations Home with assist PRN;Home with Home health PT;Outpatient PT   Activity Tolerance   Activity Tolerance Patient Tolerated treatment well   Plan   Times per week 12 treatments BID   Current Treatment Recommendations Strengthening;ROM; Functional Mobility Training;Transfer Training;Stair training;Gait Training;Home Exercise Program   Emeka Hurtado PTA     Time in: 143 Time out: 1531

## 2017-12-20 NOTE — PROGRESS NOTES
Cognition   Overall Cognitive Status WFL   Perception   Overall Perceptual Status WFL   Patient Goals    Patient goals  to go home   Short term goals   Time Frame for Short term goals by discharge, pt will   Short term goal 1 demo S/MI with ADL transfers with good safety    Short term goal 2 demo S/MI with functional mob for ADL completion with good safety   Short term goal 3 demo SBA with toileting routine    Short term goal 4 demo I with UB ADLs and min A LB AdLs with good safety and pacing   Short term goal 5 verb good understanding of EC/WS techs, fall prevention techs, and possible equip needs to use at home    Assessment   Performance deficits / Impairments Decreased functional mobility ; Decreased ADL status; Decreased strength;Decreased safe awareness;Decreased endurance;Decreased balance   Prognosis Good   Patient Education OT POC, fall prevention, home safety, home setup, pain managment, positioning   REQUIRES OT FOLLOW UP Yes   Discharge Recommendations Home with Home health OT   Activity Tolerance   Activity Tolerance Patient Tolerated treatment well   Safety Devices   Safety Devices in place Yes   Type of devices Nurse notified; Left in bed;Patient at risk for falls;Gait belt;Call light within reach; All fall risk precautions in place   Restraints   Initially in place No   Other Comments   Comments TIME:[id number]   Plan   Times per week 5x/week, 1-2x/day   Current Treatment Recommendations Strengthening;Balance Training;Functional Mobility Training; Endurance Training;Self-Care / ADL; Safety Education & Training;Patient/Caregiver Education & Training;Equipment Evaluation, Education, & procurement   Upon arrival, pt supine in bed. Supine>sit EOB to don underwear. Sit>stand, demo mobility to bathroom to complete transfer to/from shower. Demo mobility to chair. Edu provided. Pt requesting to return BTB due to increased pain in chair. Sit>stand step transfer to bed.  Sit>supine, repositioned towards middle and HOB. Upon writer exit, call light within reach, pt retired to bed. All lines intact and patient positioned comfortably. All patient needs addressed prior to ending therapy session. Chart reviewed prior to treatment and patient is agreeable for therapy. RN reports patient is medically stable for therapy treatment this date. Pt would benefit from skilled home OT services in order to maximize occupational performance, safety, and independence in the home environment.    Chyna DAVIS

## 2017-12-20 NOTE — PROGRESS NOTES
bedroom/bathroom  Home Access: Stairs to enter without rails  Entrance Stairs - Number of Steps: 2  Bathroom Shower/Tub: Walk-in shower  Bathroom Toilet: Handicap height  Bathroom Equipment: Shower chair, Grab bars in shower  Home Equipment: Rolling walker  ADL Assistance: [address]: Independent  Homemaking Responsibilities: Yes  Ambulation Assistance: Independent  Transfer Assistance: Independent  Active : Yes  Mode of Transportation: Car  Occupation:  (makes own furniture; has own business)  Objective          AROM RLE (degrees)  RLE General AROM: Ankle WNL, hip/knee NA due to post op bandaging  AROM LLE (degrees)  LLE AROM : WNL  AROM RUE (degrees)  RUE General AROM: See OT eval for B UE ROM  Strength RLE  Comment: R ankle 5/5, hip/knee NA  Strength LLE  Strength LLE: WFL  Comment: 5/5 L LE  Strength RUE  Comment: See OT eval for B UE MMT  Tone RLE  RLE Tone: Normotonic  Tone LLE  LLE Tone: Normotonic  Motor Control  Gross Motor?: WNL     Bed mobility  Rolling to Left: Supervision  Supine to Sit: Minimal assistance  Sit to Supine: Minimal assistance  Scooting: Minimal assistance  Transfers  Sit to Stand: Contact guard assistance  Stand to sit: Contact guard assistance  Stand Pivot Transfers: Contact guard assistance  Ambulation  Ambulation?: Yes  Ambulation 1  Surface: level tile  Device: Rolling Walker  Assistance: Contact guard assistance  Quality of Gait: Good pattern  Distance: 12' x 1  Comments: Limited by dizziness,BP 96/45. Pt. KENNETH VILLASEÑOR (Jennifer Conde) notified  Stairs/Curb  Stairs?: No     Balance  Posture: Good  Sitting - Static: Good  Sitting - Dynamic: Good  Standing - Static: Good (w/ RW)  Standing - Dynamic: Good (w/ RW)  Exercises  Ankle Pumps: Instructed to perform 15-20 reps B 6-8x/hour     Assessment   Body structures, Functions, Activity limitations: Decreased functional mobility ; Decreased ROM; Decreased strength  Prognosis: Excellent  Decision Making: Low Complexity  REQUIRES PT FOLLOW UP: Yes  Activity Tolerance  Activity Tolerance: Other (Limited by dizziness,low BP)     Discharge Recommendations:  Home with assist PRN, Home with Home health PT, Outpatient PT [facility] PT progress to OP)      Plan   Plan  Times per week: 12 treatments BID  Current Treatment Recommendations: Strengthening, ROM, Functional Mobility Training, Transfer Training, Stair training, Gait Training, Home Exercise Program  Safety Devices  Type of devices: Gait belt, Left in bed, Call light within reach, Nurse notified  Restraints  Initially in place: No    G-Code  PT G-Codes  Functional Assessment Tool Used: KFO  Score: 15  Functional Limitation: Mobility: Walking and moving around  Mobility: Walking and Moving Around Current Status (): At least 40 percent but less than 60 percent impaired, limited or restricted  Mobility: Walking and Moving Around Goal Status (): 0 percent impaired, limited or restricted  OutComes Score                                           AM-PAC Score             Goals  Short term goals  Time Frame for Short term goals: 12 treatments  Short term goal 1: Independent bed mobility/transfers  Short term goal 2: Independent ambulation w/ RW FWB R ' x 1  Short term goal 3: ROM/strengthening R LE per post op protocol  Short term goal 4:  Independently ascend/descend 2 steps without HR  Patient Goals   Patient goals : 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

## 2017-12-20 NOTE — CARE COORDINATION
ORTHOPEDIC NAVIGATOR DAILY ROUNDING NOTE  Patient:  Kalyn Herman is a 74 y.o.  female  Admit Date:    2017   Attending:   Gisela Burgess Dx :   Karsonrajinder Meek Day #  1    ASSESSMENT:  Met with:    Patient in patient's room. Patient's LOC:  alert and oriented X3  Patient progress  Good - had some hypotension and dizziness this a.m. - had to cut short O.T. Patient's Pain:  Patient rates pain at a 5 currently. Patient's Nausea: Patient states they have had no nausea & no vomiting today. Patient Intake:   Patient states good intake. Patient Voiding: Patient states adequate output. Patient Bowels: Patient states they have not had a BM since surgery. Brown out in 24 hr? yes  Infublock Pump in? yes  Pump Settin ml/hour with 3 ml bolus at half hour intervals    Labs     Recent Labs      17   0553   HGB  10.3*   HCT  30.3*          Recent Labs      17   0553   WBC  7.3   BUN  17   NA  139       Height /Weight   Height and Weight  Height: 5' 5\" (165.1 cm)  Weight: 151 lb (68.5 kg)  Weight Method: Actual  BSA (Calculated - sq m): 1.77 sq meters  BMI (Calculated): 25.2  Vital Signs   Vital Signs  Temp: 98.4 °F (36.9 °C)  Temp Source: Oral  Pulse: 68  Heart Rate Source: Monitor  Resp: 18  BP: (!) 91/56  BP Location: Right Arm  BP Upper/Lower: Upper  MAP (mmHg): 64  Patient Position: Semi fowlers  Level of Consciousness: Alert  MEWS Score: 4  Patient Currently in Pain: Yes    WALKER/DME:  DME order placed? No - none needed  DME Agency:   n/a    ANTICOAGULATION METHOD:  Anticoag Med/Dose: Xarelto 10 mg for 10 days   Co-pay per pharm: ___  Pharmacy Name: ___  Co-pay ok'd by pt: ___    CONTINUITY OF CARE FORM:  The LACIE form is on the chart. The [address] [place] form is not signed by the physician. DISCHARGE PLAN:  Confirmed with patient that discharge plan is Home with  [facility]. Agency/Facility chosen:  [address] [place] [address] pre-certification: no    DISCHARGE DATE: Anticipated discharge date: 12/21/17    Patient's questions and concerns were answered. Navigator actively listened and provided reassurance to patient.      Electronically signed by:  Hortencia Ramirez, MSN, RN, CNL on 12/20/17 at 1:40 PM  Orthopedic Nurse [address] [place] [address]   Phone: [phone] / Fax: [phone]

## 2017-12-20 NOTE — CARE COORDINATION
Bacteria loves sugar and we don't want to give bacteria a fighting chance! Incision Care:   Keep dressing intact until seen and removed by surgeon.  If dressing becomes saturated, then call the surgeon.  If dressing falls off, then call surgeon.  You may shower with the dressing in place.  If you have a BETO dressing, disconnect the battery pack and         reconnect the battery pack after your shower.  Ice the surgical site four times a day, for twenty minutes. Normal Conditions:   Swelling in the operative leg is normal: this should reduce over time. Ice and elevate your leg 4 x a day for 20 minutes each time, to reduce the swelling which will also reduce your pain.  Compression Socks go on in the a.m. and off in the p.m.  Some post-operative pain is normal and will get as time goes on.  Some constipation is normal due to the narcotic pain medications you are taking. To relieve constipation, drink lots of fluids, eat foods high in fiber, walk more frequently, and take an over the counter laxative as needed.  Slight warmth of operative leg is normal the first few weeks.  Fatigue and moderate pain after therapy is normal the first few weeks.  Numbness near the incision site is normal and will improve with time. When to call the Surgeon:   Increased redness, warmth, drainage, swelling or odor from incision site.  Temperature above 101 degrees, not relieved by Tylenol after two doses.  Pain worsening or not well-controlled by prescribed medications.  Calf tenderness, swelling, or redness not relieved by elevation and ice.  Shortness of breath or chest pain.  Any incision or surgical-related concerns.  Call surgeon with concerns PRIOR TO coming to the ER. If you have an Infu-Block Pain Pump:  Understanding Your InfuBlock Pain Pump  The Pain Pump is filled with a local anesthetic medication to relieve pain after surgery.  The pump is connected to a small cup    Artichoke   6.9 grams of fiber in 1 artichoke    Pear    5.5 grams of fiber in 1 pear    Apples   4.4 grams of fiber in 1 apple    Beets   3.8 grams of fiber in 1 cup    Sweet Potato  3.8 grams of fiber in 1 potato    Blueberries  3.6 grams of fiber in 1 cup    Carrots  3.4 grams of fiber in 1 cup     Brussel Sprouts 3.3 grams of fiber in 1 cup    Banana  3.1 grams of fiber in 1 banana       Strawberries  3 grams of fiber in 1 cup     Broccoli  2.4 grams of fiber in 1 cup  BEANS, LEGUMES, GRAINS HIGH IN FIBER:    Oats   16.5 grams of fiber in 1 cup raw oats     Split Peas  16.3 gramsof fiber in 1 cup, cooked    Lentils   15.6 grams of fiber in 1 cup, cooked    Chickpeas  12.5 grams of fiber in 1 cup, cooked    Kidney Beans  11.3 grams of fiber in 1 cup, cooked  OTHER HIGH FIBER TREATS:    Almonds  3.5 grams of fiber per ounce    Dark Chocolate  3.1 grams of fiber per ounce    Foods high in protein to speed wound healing:    Chicken, 3 oz.  28 grams    [place] ([facility]), 3 oz.   25 grams    Pork, 3 oz.  22 grams    [place], 3 oz.  22 grams    Tuna, 3 oz.  22 grams    [place]kers Beans, 1 cup 22 grams    3 Eggs, large  18 grams

## 2017-12-20 NOTE — CARE COORDINATION
DISCHARGE PLANS / WHITEBOARD NOTES:    LACIE NOT SIGNED. Patient intends to DC to home with [facility] for 2 weeks and then OPPT at [address]    No DME needed. Anticaog = Xarelto 10 mg daily.     F/U yariel/Giovany = 12/26/17at 11:00 a.m    Jaye Sanchez, AUDELIA, RN, CNL on 12/20/17 at 9:32 AM  Orthopedic Nurse [address] [place] [address], [address]  Phone: [phone] / Fax: [phone]

## 2017-12-21 VITALS
TEMPERATURE: 98.6 F | DIASTOLIC BLOOD PRESSURE: 48 MMHG | HEART RATE: 76 BPM | OXYGEN SATURATION: 98 % | SYSTOLIC BLOOD PRESSURE: 91 MMHG | RESPIRATION RATE: 16 BRPM | HEIGHT: 65 IN | BODY MASS INDEX: 25.16 KG/M2 | WEIGHT: 151 LBS

## 2017-12-21 LAB
ABSOLUTE EOS #: 0.6 K/UL (ref 0–0.4)
ABSOLUTE IMMATURE GRANULOCYTE: ABNORMAL K/UL (ref 0–0.3)
ABSOLUTE LYMPH #: 0.8 K/UL (ref 1–4.8)
ABSOLUTE MONO #: 0.6 K/UL (ref 0.2–0.8)
ANION GAP SERPL CALCULATED.3IONS-SCNC: 9 MMOL/L (ref 9–17)
BASOPHILS # BLD: 1 % (ref 0–2)
BASOPHILS ABSOLUTE: 0 K/UL (ref 0–0.2)
BUN BLDV-MCNC: 20 MG/DL (ref 8–23)
BUN/CREAT BLD: 17 (ref 9–20)
CALCIUM SERPL-MCNC: 8.6 MG/DL (ref 8.6–10.4)
CHLORIDE BLD-SCNC: 102 MMOL/L (ref 98–107)
CO2: 29 MMOL/L (ref 20–31)
CREAT SERPL-MCNC: 1.15 MG/DL (ref 0.5–0.9)
DIFFERENTIAL TYPE: ABNORMAL
EOSINOPHILS RELATIVE PERCENT: 8 % (ref 1–4)
GFR AFRICAN AMERICAN: 56 ML/MIN
GFR NON-AFRICAN AMERICAN: 46 ML/MIN
GFR SERPL CREATININE-BSD FRML MDRD: ABNORMAL ML/MIN/{1.73_M2}
GFR SERPL CREATININE-BSD FRML MDRD: ABNORMAL ML/MIN/{1.73_M2}
GLUCOSE BLD-MCNC: 126 MG/DL (ref 70–99)
HCT VFR BLD CALC: 29 % (ref 36–46)
HEMOGLOBIN: 9.8 G/DL (ref 12–16)
IMMATURE GRANULOCYTES: ABNORMAL %
LYMPHOCYTES # BLD: 11 % (ref 24–44)
MCH RBC QN AUTO: 29.8 PG (ref 26–34)
MCHC RBC AUTO-ENTMCNC: 33.7 G/DL (ref 31–37)
MCV RBC AUTO: 88.5 FL (ref 80–100)
MONOCYTES # BLD: 8 % (ref 1–7)
PDW BLD-RTO: 12.3 % (ref 11.5–14.5)
PLATELET # BLD: 150 K/UL (ref 130–400)
PLATELET ESTIMATE: ABNORMAL
PMV BLD AUTO: ABNORMAL FL (ref 6–12)
POTASSIUM SERPL-SCNC: 3.9 MMOL/L (ref 3.7–5.3)
RBC # BLD: 3.28 M/UL (ref 4–5.2)
RBC # BLD: ABNORMAL 10*6/UL
SEG NEUTROPHILS: 72 % (ref 36–66)
SEGMENTED NEUTROPHILS ABSOLUTE COUNT: 5.2 K/UL (ref 1.8–7.7)
SODIUM BLD-SCNC: 140 MMOL/L (ref 135–144)
WBC # BLD: 7.2 K/UL (ref 3.5–11)
WBC # BLD: ABNORMAL 10*3/UL

## 2017-12-21 PROCEDURE — 97530 THERAPEUTIC ACTIVITIES: CPT

## 2017-12-21 PROCEDURE — 97530 THERAPEUTIC ACTIVITIES: CPT | Performed by: NURSE PRACTITIONER

## 2017-12-21 PROCEDURE — 97116 GAIT TRAINING THERAPY: CPT

## 2017-12-21 PROCEDURE — 36415 COLL VENOUS BLD VENIPUNCTURE: CPT

## 2017-12-21 PROCEDURE — 80048 BASIC METABOLIC PNL TOTAL CA: CPT

## 2017-12-21 PROCEDURE — 97110 THERAPEUTIC EXERCISES: CPT

## 2017-12-21 PROCEDURE — 6370000000 HC RX 637 (ALT 250 FOR IP): Performed by: STUDENT IN AN ORGANIZED HEALTH CARE EDUCATION/TRAINING PROGRAM

## 2017-12-21 PROCEDURE — 85025 COMPLETE CBC W/AUTO DIFF WBC: CPT

## 2017-12-21 PROCEDURE — 97535 SELF CARE MNGMENT TRAINING: CPT | Performed by: NURSE PRACTITIONER

## 2017-12-21 RX ORDER — PSEUDOEPHEDRINE HCL 30 MG
100 TABLET ORAL 2 TIMES DAILY
COMMUNITY
Start: 2017-12-21 | End: 2018-02-28

## 2017-12-21 RX ORDER — OXYCODONE HYDROCHLORIDE AND ACETAMINOPHEN 5; 325 MG/1; MG/1
1 TABLET ORAL EVERY 4 HOURS PRN
Qty: 84 TABLET | Refills: 0
Start: 2017-12-21 | End: 2017-12-28

## 2017-12-21 RX ADMIN — OXYBUTYNIN CHLORIDE 10 MG: 10 TABLET, FILM COATED, EXTENDED RELEASE ORAL at 08:12

## 2017-12-21 RX ADMIN — LEVOTHYROXINE SODIUM 50 MCG: 50 TABLET ORAL at 06:01

## 2017-12-21 RX ADMIN — OXYCODONE HYDROCHLORIDE AND ACETAMINOPHEN 2 TABLET: 5; 325 TABLET ORAL at 14:23

## 2017-12-21 RX ADMIN — DOCUSATE SODIUM 100 MG: 100 CAPSULE, LIQUID FILLED ORAL at 08:12

## 2017-12-21 RX ADMIN — METOPROLOL TARTRATE 25 MG: 25 TABLET ORAL at 08:11

## 2017-12-21 RX ADMIN — OXYCODONE HYDROCHLORIDE AND ACETAMINOPHEN 2 TABLET: 5; 325 TABLET ORAL at 06:00

## 2017-12-21 RX ADMIN — OXYCODONE HYDROCHLORIDE AND ACETAMINOPHEN 2 TABLET: 5; 325 TABLET ORAL at 01:34

## 2017-12-21 RX ADMIN — OXYCODONE HYDROCHLORIDE AND ACETAMINOPHEN 2 TABLET: 5; 325 TABLET ORAL at 10:13

## 2017-12-21 RX ADMIN — AMLODIPINE BESYLATE 10 MG: 10 TABLET ORAL at 08:11

## 2017-12-21 ASSESSMENT — PAIN SCALES - GENERAL
PAINLEVEL_OUTOF10: 7
PAINLEVEL_OUTOF10: 4
PAINLEVEL_OUTOF10: 0
PAINLEVEL_OUTOF10: 5
PAINLEVEL_OUTOF10: 0
PAINLEVEL_OUTOF10: 0
PAINLEVEL_OUTOF10: 7
PAINLEVEL_OUTOF10: 0
PAINLEVEL_OUTOF10: 6
PAINLEVEL_OUTOF10: 10
PAINLEVEL_OUTOF10: 0

## 2017-12-21 ASSESSMENT — PAIN DESCRIPTION - FREQUENCY
FREQUENCY: CONTINUOUS
FREQUENCY: CONTINUOUS
FREQUENCY: INTERMITTENT
FREQUENCY: INTERMITTENT

## 2017-12-21 ASSESSMENT — PAIN DESCRIPTION - ORIENTATION
ORIENTATION: RIGHT
ORIENTATION: LEFT
ORIENTATION: LEFT
ORIENTATION: RIGHT
ORIENTATION: LEFT
ORIENTATION: RIGHT

## 2017-12-21 ASSESSMENT — PAIN DESCRIPTION - LOCATION
LOCATION: BACK;HIP
LOCATION: KNEE

## 2017-12-21 ASSESSMENT — PAIN DESCRIPTION - PAIN TYPE
TYPE: SURGICAL PAIN

## 2017-12-21 ASSESSMENT — PAIN DESCRIPTION - ONSET
ONSET: ON-GOING

## 2017-12-21 ASSESSMENT — PAIN DESCRIPTION - DESCRIPTORS
DESCRIPTORS: ACHING

## 2017-12-21 ASSESSMENT — PAIN DESCRIPTION - PROGRESSION
CLINICAL_PROGRESSION: GRADUALLY WORSENING
CLINICAL_PROGRESSION: GRADUALLY WORSENING

## 2017-12-21 NOTE — PLAN OF CARE
Problem: Falls - Risk of  Goal: Absence of falls  Outcome: Ongoing      Problem: ABCDS Injury Assessment  Goal: Absence of physical injury  Outcome: Ongoing      Problem: Infection:  Goal: Will remain free from infection  Will remain free from infection   Outcome: Ongoing      Problem: Safety:  Goal: Free from accidental physical injury  Free from accidental physical injury   Outcome: Ongoing    Goal: Free from intentional harm  Free from intentional harm   Outcome: Ongoing      Problem: Daily Care:  Goal: Daily care needs are met  Daily care needs are met   Outcome: Ongoing      Problem: Pain:  Goal: Patient's pain/discomfort is manageable  Patient's pain/discomfort is manageable   Outcome: Ongoing    Goal: Control of acute pain  Control of acute pain   Outcome: Ongoing    Goal: Control of chronic pain  Control of chronic pain   Outcome: Ongoing    Goal: Pain level will decrease  Pain level will decrease    Outcome: Ongoing      Problem: Skin Integrity:  Goal: Skin integrity will stabilize  Skin integrity will stabilize   Outcome: Ongoing      Problem: Discharge Planning:  Goal: Patients continuum of care needs are met  Patients continuum of care needs are met   Outcome: Ongoing      Problem: Discharge Planning:  Goal: Discharged to appropriate level of care  Discharged to appropriate level of care   Outcome: Ongoing      Problem: Mobility - Impaired:  Goal: Mobility will improve  Mobility will improve   Outcome: Ongoing      Problem: Infection - Surgical Site:  Goal: Will show no infection signs and symptoms  Will show no infection signs and symptoms   Outcome: Ongoing      Problem: Pain - Acute:  Goal: Pain level will decrease  Pain level will decrease    Outcome: Ongoing

## 2017-12-21 NOTE — PROGRESS NOTES
Physical Therapy  Facility/Department: [facility]  Daily Treatment Note  NAME: Radha Castillo  : [birth year]  MRN: [id number]    Date of Service: 2017  RN reports patient is medically stable for therapy treatment this date. Chart reviewed prior to treatment and patient is agreeable for therapy.       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    History of atypical mycobacterial infection 2015    Pneumonia 2015    Hypertension     Bronchiectasis 2013    Hyperlipemia 2013    Hypothyroidism        Past Medical History:   Diagnosis Date    Bacillus fragilis infection     treated one year Dr Evelio Singleton Bronchiectasis ([address]     Chronic lower back pain 2016    Chronic radicular lumbar pain 10/2/2015    Degenerative disc disease     Hyperlipidemia     Hyperreflexic bladder     Hypertension     Hypothyroidism     Primary osteoarthritis of right knee 2017     Past Surgical History:   Procedure Laterality Date    BREAST SURGERY Left     core biopsy    BRONCHOSCOPY Left 2012    COLONOSCOPY      SIS 10 y    HYSTERECTOMY      and BSO    OTHER SURGICAL HISTORY Right 11    right L4 Transforaminal epidural steroid injection    OTHER SURGICAL HISTORY Right 10/2/15    right L4 TFE    CA TOTAL KNEE ARTHROPLASTY Right 2017    KNEE TOTAL ARTHROPLASTY RIGHT WITH WOO & NEPHEW  AQUAMANTIS   PLATELET GEL performed by Anabel Rome DO at [address]   TOTAL KNEE ARTHROPLASTY Right 12/19/2017       Restrictions  Restrictions/Precautions  Restrictions/Precautions: General Precautions, Fall Risk, Weight Bearing, Femoral Block, Surgical Protocols  Required Braces or Orthoses?: No  Lower Extremity Weight Bearing Restrictions  Right Lower Extremity Weight Bearing: Weight Bearing As Tolerated  Required Braces or Orthoses  Right Lower Extremity Brace: Knee Brace  RLE Brace Type: Knee immobilizer when nerve block employed during wgt, bearing  Position Activity Restriction  Other position/activity restrictions: up with assist, infublock  Subjective   General  Chart Reviewed: Yes  Response To Previous Treatment: Patient with no complaints from previous session. Subjective  Subjective: Pt agreeable to PT  General Comment  Comments: RNR[facility] PT  Pain Screening  Patient Currently in Pain: Yes  Pain Assessment  Pain Assessment: 0-10  Pain Level: 4  Pain Type: Surgical pain  Pain Location: Knee  Pain Orientation: Right  Vital Signs  Patient Currently in Pain: Yes       Orientation  Orientation  Overall Orientation Status: Within Functional Limits  Objective    Exercises  Supine HS stretch with belt for 15 sec x 5 reps.     Quad sets, HS sets, Add sets, Heelslides, Sup hip abd, SAQ, Glut sets & SLR for 12 reps, ankle pumps for 30 reps  Double pillow knee prop positioning for achievement of termal knee extension  Extended time to review pt booklet with pt & spouse of pertinent ex's & proper positioning of leg in bed for achievement of terminal knee extension    Bed mobility  Rolling to Left: Independent  Supine to Sit: Contact guard assistance  Sit to Supine: Contact guard assistance  Scooting: Contact guard assistance  Comment: sheet leg  technique as well as hooking left foot behind right heel to assist leg in & OOB was reviewed  Transfers  Sit to Stand: Contact guard assistance  Stand to sit: Contact guard assistance  Bed to Chair: Stand by assistance  Stand Pivot Transfers: Contact guard assistance  Lateral Transfers: Stand by assistance  Ambulation  Ambulation?: Yes  More Ambulation?: Yes  Ambulation 1  Surface: level tile  Device: Rolling Walker  Assistance: Contact guard assistance  Quality of Gait: step to pattern, smooth fernando & flow Of RLE  Distance: 20ft x 2, to & from BR for toileting  Pt sat to EOB after this gait & completes seated knee felxion with overpressure & LAQ x 10 reps  AAROM of RLE measured 0 extension to 78 degrees flexion  Ambulation 2  Surface - 2: level tile  Device 2: Rolling Walker  Assistance 2: Stand by assistance  Quality of Gait 2: step through pattern, smooth pattern of RLE  Distance: 250ft  Pt ambulated to PT gym & assisted to chair. Pt rested 3 minutes & then completed:  Stairs  # Steps :  7  Rails:  Right ascending  Height: 6 inches  Device:  none  Assistance:  Stand by assistance  Pt ambulated 10 ft or m stairs with R/walker & stand by assist & sat to W/C & transported back to room  Pt ambulated 10ft from W/C to sit EOB, completed sit to supine with Contact Guard Assistance,  scooted to [facility] with supervision  Pillow propped at Right lower leg for promotion of terminal knee extension. Assessment   Body structures, Functions, Activity limitations: Decreased functional mobility ; Decreased ROM; Decreased strength  Pt is safe for D/C home today, Recommend continued HH progressing to OPPT to restore ROM/strength, functional mobility independence & activity tolerance.       Prognosis: Excellent  Patient Education: TKA ex's, functional mobility, proper positioning for alignment & achievement of terminal knee extension  REQUIRES PT FOLLOW UP: Yes  Activity Tolerance  Activity Tolerance: Patient limited by fatigue       Discharge Recommendations:  Home with Home health PT, Home with assist PRN               AM-PAC Score             Goals  Short term goals  Time Frame for Short term goals: 12 treatments  Short term goal 1: Independent bed mobility/transfers  Short term goal 2: Independent ambulation w/ RW FWB R ' x 1  Short term goal 3: ROM/strengthening R LE per post op protocol  Short term goal 4:  Independently ascend/descend 2 steps without HR  Patient Goals   Patient goals : Get back to my hobby of furniture making    Plan    Plan  Times per week: 2x/D, 7D/week  Current Treatment Recommendations: Strengthening, ROM, Functional Mobility Training, Transfer Training, Stair training, Gait Training, Home Exercise Program  Safety Devices  Type of devices: Gait belt, Left in bed, Call light within reach, Nurse notified  Restraints  Initially in place: No     Therapy Time   Individual Concurrent Group Co-treatment   Time In  0900         Time Out  1025         Minutes  85                 [address], PT

## 2018-01-03 ENCOUNTER — TELEPHONE (OUTPATIENT)
Dept: INTERNAL MEDICINE | Age: 73
End: 2018-01-03

## 2018-01-03 RX ORDER — GABAPENTIN 300 MG/1
300 CAPSULE ORAL 3 TIMES DAILY
Qty: 90 CAPSULE | Refills: 2 | Status: SHIPPED | OUTPATIENT
Start: 2018-01-03 | End: 2018-01-05 | Stop reason: DRUGHIGH

## 2018-01-03 NOTE — TELEPHONE ENCOUNTER
Check If oarrs has been done.   If so, then go ahead and pend milligrams 3 times a day dispense 90, 2 refills

## 2018-01-05 ENCOUNTER — TELEPHONE (OUTPATIENT)
Dept: INTERNAL MEDICINE | Age: 73
End: 2018-01-05

## 2018-01-05 RX ORDER — GABAPENTIN 100 MG/1
100 CAPSULE ORAL 2 TIMES DAILY
Qty: 63 CAPSULE | Refills: 2 | Status: SHIPPED | OUTPATIENT
Start: 2018-01-05 | End: 2018-02-28

## 2018-01-05 RX ORDER — GABAPENTIN 300 MG/1
300 CAPSULE ORAL NIGHTLY
COMMUNITY
End: 2018-02-28

## 2018-01-05 NOTE — TELEPHONE ENCOUNTER
[pend Second prescription for 100 mg to be taken in morning and afternoon ,  and then use the 300 mg at bedtime.   Therefore, dispense #63 of 100 mg with 2 refills

## 2018-02-01 ENCOUNTER — OFFICE VISIT (OUTPATIENT)
Dept: INTERNAL MEDICINE | Age: 73
End: 2018-02-01
Payer: MEDICARE

## 2018-02-01 VITALS
SYSTOLIC BLOOD PRESSURE: 142 MMHG | WEIGHT: 145 LBS | DIASTOLIC BLOOD PRESSURE: 56 MMHG | HEART RATE: 96 BPM | RESPIRATION RATE: 16 BRPM | BODY MASS INDEX: 23.3 KG/M2 | HEIGHT: 66 IN | TEMPERATURE: 98 F

## 2018-02-01 DIAGNOSIS — I10 ESSENTIAL HYPERTENSION: Primary | ICD-10-CM

## 2018-02-01 DIAGNOSIS — E03.9 ACQUIRED HYPOTHYROIDISM: ICD-10-CM

## 2018-02-01 DIAGNOSIS — E78.5 HYPERLIPIDEMIA, UNSPECIFIED HYPERLIPIDEMIA TYPE: ICD-10-CM

## 2018-02-01 DIAGNOSIS — D64.9 ANEMIA, UNSPECIFIED TYPE: ICD-10-CM

## 2018-02-01 PROCEDURE — 99214 OFFICE O/P EST MOD 30 MIN: CPT | Performed by: INTERNAL MEDICINE

## 2018-02-01 ASSESSMENT — ENCOUNTER SYMPTOMS
BLOOD IN STOOL: 0
BACK PAIN: 0
COUGH: 0
SHORTNESS OF BREATH: 0
ABDOMINAL PAIN: 0
VOMITING: 0
CONSTIPATION: 0
NAUSEA: 0
EYE PAIN: 0
DIARRHEA: 0

## 2018-02-01 NOTE — PROGRESS NOTES
[employer] [facility]  [address] [address]  Dept: [phone]  Dept Fax: [phone]  Loc: [phone]    Manfred Cook is a 74 y.o. female who presents today for her medical conditions/complaints as noted below. Manfred Cook is c/o of   Chief Complaint   Patient presents with    Hypertension     pt wants to dicuss BP meds- feels blood pressure is runnning low    Hyperlipidemia    Knee Pain         HPI:     Hypertension   This is a chronic (essential hypertension) problem. The current episode started more than 1 year ago. The problem has been waxing and waning since onset. The problem is controlled. Pertinent negatives include no chest pain, headaches, neck pain, palpitations or shortness of breath. Hyperlipidemia   This is a chronic problem. The current episode started more than 1 year ago. The problem is controlled. Recent lipid tests were reviewed and are variable. Pertinent negatives include no chest pain or shortness of breath. Knee Pain    The incident occurred more than 1 week ago. The incident occurred at home. There was no injury mechanism. The pain is present in the right knee. The pain is mild. The pain has been fluctuating since onset. Pertinent negatives include no numbness. Other   This is a chronic (4) bronchiectasis) problem. The current episode started more than 1 month ago. The problem occurs intermittently. The problem has been gradually improving. Pertinent negatives include no abdominal pain, arthralgias, chest pain, chills, coughing, fever, headaches, nausea, neck pain, numbness, rash, vomiting or weakness.        No results found for: LABA1C             No results found for: LABMICR  LDL Cholesterol (mg/dL)   Date Value   12/16/2016 130   06/29/2016 118   01/05/2016 134 (H)         AST (U/L)   Date Value   06/23/2017 15     ALT (U/L)   Date Value   06/23/2017 15     BUN (mg/dL)   Date Value   12/21/2017 20     BP Readings from Last 3 Encounters:   02/01/18 (!) 142/50   12/21/17 (!) 91/48   12/19/17 (!) 87/52              Past Medical History:   Diagnosis Date    Bacillus fragilis infection     treated one year Dr Tyrell De Paz   [address] [place] [address] Bronchiectasis ([facility] [address]     Chronic lower back pain 2/5/2016    Chronic radicular lumbar pain 10/2/2015    Degenerative disc disease     Hyperlipidemia     Hyperreflexic bladder     Hypertension     Hypothyroidism     Primary osteoarthritis of right knee 12/20/2017      Past Surgical History:   Procedure Laterality Date    BREAST SURGERY Left 2012    core biopsy    BRONCHOSCOPY Left 6/19/2012    COLONOSCOPY  9/13    SIS 10 y    HYSTERECTOMY  1983    and BSO    OTHER SURGICAL HISTORY Right 4/21/11    right L4 Transforaminal epidural steroid injection    OTHER SURGICAL HISTORY Right 10/2/15    right L4 TFE    MI TOTAL KNEE ARTHROPLASTY Right 12/19/2017    KNEE TOTAL ARTHROPLASTY RIGHT WITH WOO & NEPHEW  AQUAMANTIS   PLATELET GEL performed by Maya Flores DO at [place] [id number] Right 12/19/2017       Family History   Problem Relation Age of Onset    Other Mother      Knee pain       Social History   Substance Use Topics    Smoking status: Former Smoker     Packs/day: 0.50     Years: 6.00     Types: Cigarettes     Quit date: 5/25/1960    Smokeless tobacco: Never Used      Comment: quit 35-40 years ago    Alcohol use No      Current Outpatient Prescriptions   Medication Sig Dispense Refill    levothyroxine (SYNTHROID) 50 MCG tablet TAKE 1 TABLET DAILY (Patient taking differently: TAKE 1 TABLET DAILY in the morning) 90 tablet 3    gabapentin (NEURONTIN) 300 MG capsule Take 300 mg by mouth nightly.  gabapentin (NEURONTIN) 100 MG capsule Take 1 capsule by mouth 2 times daily for 90 days (am and afternoon).  63 capsule 2    docusate sodium (COLACE, DULCOLAX) 100 MG CAPS Take 100 mg by mouth 2 times daily      atorvastatin (LIPITOR) 40 MG tablet Take 1 tablet by mouth daily 90 tablet 3    metoprolol tartrate (LOPRESSOR) 25 MG tablet Take 1 tablet by mouth 2 times daily 180 tablet 3    lisinopril-hydrochlorothiazide (PRINZIDE;ZESTORETIC) 20-25 MG per tablet Take 1 tablet by mouth every evening       amLODIPine (NORVASC) 10 MG tablet TAKE 1 TABLET DAILY (Patient taking differently: TAKE 1 TABLET DAILY in the morning) 90 tablet 3    oxybutynin (DITROPAN-XL) 10 MG extended release tablet TAKE 1 TABLET DAILY (Patient taking differently: TAKE 1 TABLET DAILY in the morning) 90 tablet 3    Multiple Vitamins-Minerals (MULTIVITAMIN PO) Take 2 tablets by mouth every morning        No current facility-administered medications for this visit. Allergies   Allergen Reactions    Levaquin [Levofloxacin In D5w] Other (See Comments)     Attacked muscles and tendons    Iodine Other (See Comments)     Cantu       Penicillins Other (See Comments)     abcess at site of injection       Health Maintenance   Topic Date Due    Pneumococcal low/med risk (2 of 2 - PPSV23) 12/03/2015    TSH testing  12/16/2017    DEXA (modify frequency per FRAX score)  11/10/2017    DTaP/Tdap/Td vaccine (1 - Tdap) 06/26/2018 (Originally 4/16/1964)    Breast cancer screen  11/15/2018    Potassium monitoring  12/21/2018    Creatinine monitoring  12/21/2018    Lipid screen  12/16/2021    Colon cancer screen colonoscopy  09/06/2023    Zostavax vaccine  Completed    Flu vaccine  Completed       Subjective:      Review of Systems   Constitutional: Negative for chills and fever. HENT: Negative for hearing loss. Eyes: Negative for pain and visual disturbance. Respiratory: Negative for cough and shortness of breath. Cardiovascular: Negative for chest pain, palpitations and leg swelling. Gastrointestinal: Negative for abdominal pain, blood in stool, constipation, diarrhea, nausea and vomiting. Endocrine: Negative for cold intolerance, polydipsia and polyuria.    Genitourinary: Negative for

## 2018-02-05 ENCOUNTER — TELEPHONE (OUTPATIENT)
Dept: INTERNAL MEDICINE | Age: 73
End: 2018-02-05

## 2018-02-05 ENCOUNTER — HOSPITAL ENCOUNTER (EMERGENCY)
Age: 73
Discharge: HOME | End: 2018-02-05
Attending: EMERGENCY MEDICINE
Payer: MEDICARE

## 2018-02-05 ENCOUNTER — APPOINTMENT (OUTPATIENT)
Dept: CT IMAGING | Age: 73
End: 2018-02-05
Payer: MEDICARE

## 2018-02-05 VITALS
BODY MASS INDEX: 23.66 KG/M2 | SYSTOLIC BLOOD PRESSURE: 154 MMHG | TEMPERATURE: 98.8 F | HEIGHT: 65 IN | HEART RATE: 69 BPM | RESPIRATION RATE: 18 BRPM | DIASTOLIC BLOOD PRESSURE: 72 MMHG | WEIGHT: 142 LBS | OXYGEN SATURATION: 100 %

## 2018-02-05 DIAGNOSIS — R11.2 NAUSEA AND VOMITING, INTRACTABILITY OF VOMITING NOT SPECIFIED, UNSPECIFIED VOMITING TYPE: Primary | ICD-10-CM

## 2018-02-05 DIAGNOSIS — N32.89 SPASTIC BLADDER: ICD-10-CM

## 2018-02-05 LAB
-: NORMAL
ABSOLUTE EOS #: 0.28 K/UL (ref 0–0.4)
ABSOLUTE IMMATURE GRANULOCYTE: ABNORMAL K/UL (ref 0–0.3)
ABSOLUTE LYMPH #: 0.88 K/UL (ref 1–4.8)
ABSOLUTE MONO #: 0.39 K/UL (ref 0.1–1.2)
ALBUMIN SERPL-MCNC: 4.2 G/DL (ref 3.5–5.2)
ALBUMIN/GLOBULIN RATIO: 1.4 (ref 1–2.5)
ALP BLD-CCNC: 90 U/L (ref 35–104)
ALT SERPL-CCNC: 16 U/L (ref 5–33)
AMORPHOUS: NORMAL
AMYLASE: 62 U/L (ref 28–100)
ANION GAP SERPL CALCULATED.3IONS-SCNC: 16 MMOL/L (ref 9–17)
AST SERPL-CCNC: 16 U/L
BACTERIA: NORMAL
BASOPHILS # BLD: 0 % (ref 0–1)
BASOPHILS ABSOLUTE: 0.02 K/UL (ref 0–0.2)
BILIRUB SERPL-MCNC: 0.36 MG/DL (ref 0.3–1.2)
BILIRUBIN DIRECT: 0.1 MG/DL
BILIRUBIN URINE: NEGATIVE
BILIRUBIN, INDIRECT: 0.26 MG/DL (ref 0–1)
BUN BLDV-MCNC: 13 MG/DL (ref 8–23)
BUN/CREAT BLD: 16 (ref 9–20)
CALCIUM SERPL-MCNC: 9.8 MG/DL (ref 8.6–10.4)
CASTS UA: NORMAL /LPF (ref 0–2)
CHLORIDE BLD-SCNC: 101 MMOL/L (ref 98–107)
CO2: 25 MMOL/L (ref 20–31)
COLOR: NORMAL
COMMENT UA: NORMAL
CREAT SERPL-MCNC: 0.79 MG/DL (ref 0.5–0.9)
CRYSTALS, UA: NORMAL /HPF
DIFFERENTIAL TYPE: ABNORMAL
EKG ATRIAL RATE: 74 BPM
EKG P AXIS: 53 DEGREES
EKG P-R INTERVAL: 154 MS
EKG Q-T INTERVAL: 406 MS
EKG QRS DURATION: 70 MS
EKG QTC CALCULATION (BAZETT): 483 MS
EKG R AXIS: -17 DEGREES
EKG T AXIS: 55 DEGREES
EKG VENTRICULAR RATE: 85 BPM
EOSINOPHILS RELATIVE PERCENT: 4 % (ref 1–7)
EPITHELIAL CELLS UA: NORMAL /HPF (ref 0–5)
GFR AFRICAN AMERICAN: >60 ML/MIN
GFR NON-AFRICAN AMERICAN: >60 ML/MIN
GFR SERPL CREATININE-BSD FRML MDRD: ABNORMAL ML/MIN/{1.73_M2}
GFR SERPL CREATININE-BSD FRML MDRD: ABNORMAL ML/MIN/{1.73_M2}
GLOBULIN: 3.1 G/DL (ref 1.5–3.8)
GLUCOSE BLD-MCNC: 108 MG/DL (ref 70–99)
GLUCOSE URINE: NEGATIVE
HCT VFR BLD CALC: 37.8 % (ref 36–46)
HEMOGLOBIN: 12.4 G/DL (ref 12–16)
IMMATURE GRANULOCYTES: ABNORMAL %
KETONES, URINE: NEGATIVE
LACTIC ACID: 0.9 MMOL/L (ref 0.5–2.2)
LEUKOCYTE ESTERASE, URINE: NEGATIVE
LIPASE: 38 U/L (ref 13–60)
LYMPHOCYTES # BLD: 13 % (ref 16–46)
MCH RBC QN AUTO: 27.8 PG (ref 26–34)
MCHC RBC AUTO-ENTMCNC: 32.9 G/DL (ref 31–37)
MCV RBC AUTO: 84.6 FL (ref 80–100)
MONOCYTES # BLD: 6 % (ref 4–11)
MUCUS: NORMAL
NITRITE, URINE: NEGATIVE
NRBC AUTOMATED: ABNORMAL PER 100 WBC
OTHER OBSERVATIONS UA: NORMAL
PDW BLD-RTO: 11.9 % (ref 11–14.5)
PH UA: 5.5 (ref 5–6)
PLATELET # BLD: 298 K/UL (ref 140–450)
PLATELET ESTIMATE: ABNORMAL
PMV BLD AUTO: 8 FL (ref 6–12)
POTASSIUM SERPL-SCNC: 3.7 MMOL/L (ref 3.7–5.3)
PROTEIN UA: NEGATIVE
RBC # BLD: 4.48 M/UL (ref 4–5.2)
RBC # BLD: ABNORMAL 10*6/UL
RBC UA: NORMAL /HPF (ref 0–4)
RENAL EPITHELIAL, UA: NORMAL /HPF
SEG NEUTROPHILS: 77 % (ref 43–77)
SEGMENTED NEUTROPHILS ABSOLUTE COUNT: 5.5 K/UL (ref 1.8–7.7)
SODIUM BLD-SCNC: 142 MMOL/L (ref 135–144)
SPECIFIC GRAVITY UA: 1.02 (ref 1.01–1.02)
TOTAL PROTEIN: 7.3 G/DL (ref 6.4–8.3)
TRICHOMONAS: NORMAL
TROPONIN INTERP: NORMAL
TROPONIN T: <0.03 NG/ML
TURBIDITY: NORMAL
URINE HGB: NEGATIVE
UROBILINOGEN, URINE: NORMAL
WBC # BLD: 7 K/UL (ref 3.5–11)
WBC # BLD: ABNORMAL 10*3/UL
WBC UA: NORMAL /HPF (ref 0–4)
YEAST: NORMAL

## 2018-02-05 PROCEDURE — 82150 ASSAY OF AMYLASE: CPT

## 2018-02-05 PROCEDURE — 96374 THER/PROPH/DIAG INJ IV PUSH: CPT

## 2018-02-05 PROCEDURE — 6360000002 HC RX W HCPCS: Performed by: EMERGENCY MEDICINE

## 2018-02-05 PROCEDURE — 74176 CT ABD & PELVIS W/O CONTRAST: CPT

## 2018-02-05 PROCEDURE — 84484 ASSAY OF TROPONIN QUANT: CPT

## 2018-02-05 PROCEDURE — 93005 ELECTROCARDIOGRAM TRACING: CPT

## 2018-02-05 PROCEDURE — 81001 URINALYSIS AUTO W/SCOPE: CPT

## 2018-02-05 PROCEDURE — 83690 ASSAY OF LIPASE: CPT

## 2018-02-05 PROCEDURE — 80048 BASIC METABOLIC PNL TOTAL CA: CPT

## 2018-02-05 PROCEDURE — 2580000003 HC RX 258: Performed by: EMERGENCY MEDICINE

## 2018-02-05 PROCEDURE — 36415 COLL VENOUS BLD VENIPUNCTURE: CPT

## 2018-02-05 PROCEDURE — 85025 COMPLETE CBC W/AUTO DIFF WBC: CPT

## 2018-02-05 PROCEDURE — 83605 ASSAY OF LACTIC ACID: CPT

## 2018-02-05 PROCEDURE — 80076 HEPATIC FUNCTION PANEL: CPT

## 2018-02-05 PROCEDURE — 99284 EMERGENCY DEPT VISIT MOD MDM: CPT

## 2018-02-05 RX ORDER — ONDANSETRON 2 MG/ML
4 INJECTION INTRAMUSCULAR; INTRAVENOUS ONCE
Status: COMPLETED | OUTPATIENT
Start: 2018-02-05 | End: 2018-02-05

## 2018-02-05 RX ORDER — 0.9 % SODIUM CHLORIDE 0.9 %
500 INTRAVENOUS SOLUTION INTRAVENOUS ONCE
Status: COMPLETED | OUTPATIENT
Start: 2018-02-05 | End: 2018-02-05

## 2018-02-05 RX ORDER — ONDANSETRON 4 MG/1
4 TABLET, FILM COATED ORAL EVERY 8 HOURS PRN
Qty: 20 TABLET | Refills: 0 | Status: SHIPPED | OUTPATIENT
Start: 2018-02-05 | End: 2018-05-08 | Stop reason: ALTCHOICE

## 2018-02-05 RX ORDER — OXYBUTYNIN CHLORIDE 10 MG/1
TABLET, EXTENDED RELEASE ORAL
Qty: 90 TABLET | Refills: 3 | Status: SHIPPED | OUTPATIENT
Start: 2018-02-05 | End: 2019-02-03 | Stop reason: SDUPTHER

## 2018-02-05 RX ADMIN — SODIUM CHLORIDE 500 ML: 9 INJECTION, SOLUTION INTRAVENOUS at 11:51

## 2018-02-05 RX ADMIN — ONDANSETRON 4 MG: 2 INJECTION INTRAMUSCULAR; INTRAVENOUS at 11:48

## 2018-02-05 ASSESSMENT — ENCOUNTER SYMPTOMS
VOMITING: 1
NAUSEA: 1
DIARRHEA: 1

## 2018-02-05 ASSESSMENT — PAIN DESCRIPTION - LOCATION: LOCATION: KNEE

## 2018-02-05 ASSESSMENT — PAIN DESCRIPTION - PAIN TYPE: TYPE: CHRONIC PAIN

## 2018-02-05 ASSESSMENT — PAIN DESCRIPTION - ORIENTATION: ORIENTATION: RIGHT

## 2018-02-05 NOTE — ED PROVIDER NOTES
[facility] ED  [address]  Phone: [phone]        Pt Name: Kristine Caraballo  MRN: [id number]  [place] 1945  Date of evaluation: 18      CHIEF COMPLAINT       Chief Complaint   Patient presents with    Nausea    Fatigue         HISTORY OF PRESENT ILLNESS  (Location/Symptom, Timing/Onset, Context/Setting, Quality, Duration, Modifying Factors, Severity.)    Kristine Caraballo is a 74 y.o. female who presents With nausea, vomiting and diarrhea. The patient states that for the last week. She's had nausea, vomiting and diarrhea no fever no chills no blood in her emesis or stool. The patient states that nothing makes her symptoms better or worse. She denies any chest pain no shortness of breath. Denies any abdominal pain, although she has nausea, vomiting and diarrhea      REVIEW OF SYSTEMS    (2-9 systems for level 4, 10 or more for level 5)     Review of Systems   Gastrointestinal: Positive for diarrhea, nausea and vomiting. All other systems reviewed and are negative. PAST MEDICAL HISTORY    has a past medical history of Bacillus fragilis infection; Bronchiectasis ([address] Chronic lower back pain; Chronic radicular lumbar pain; Degenerative disc disease; Hyperlipidemia; Hyperreflexic bladder; Hypertension; Hypothyroidism; and Primary osteoarthritis of right knee. SURGICAL HISTORY      has a past surgical history that includes Hysterectomy (); bronchoscopy (Left, 2012); other surgical history (Right, 11); Breast surgery (Left, ); Colonoscopy (); Tonsillectomy; other surgical history (Right, 10/2/15); Total knee arthroplasty (Right, 2017); and total knee arthroplasty (Right, 2017).     CURRENT MEDICATIONS       Previous Medications    AMLODIPINE (NORVASC) 10 MG TABLET    TAKE 1 TABLET DAILY    ATORVASTATIN (LIPITOR) 40 MG TABLET    Take 1 tablet by mouth daily    DOCUSATE SODIUM (COLACE, DULCOLAX) 100 MG CAPS    Take palpation in the epigastric region   Musculoskeletal: Normal range of motion. She exhibits no edema. Neurological: She is alert. No focal deficits are appreciated   Skin: Skin is warm and dry. No rash noted. Psychiatric: She has a normal mood and affect. Nursing note and vitals reviewed. DIFFERENTIAL DIAGNOSIS/ MDM:     I will give the patient some IV fluids check labs and EKG, UA as well as a CT of the abdomen and pelvis    DIAGNOSTIC RESULTS     EKG: All EKG's are interpreted by the Emergency Department Physician who either signs or Co-signs this chart in the absence of a cardiologist.      Interpreted by Alber Sam MD     Rhythm: normal sinus   Rate: 85  Axis: -17  Ectopy: Frequent PVCs  Conduction: normal  ST Segments: no acute change  T Waves: no acute change  Q Waves: none    Clinical Impression: normal sinus rhythm with frequent PVCs    RADIOLOGY:   Non-plain film images such as CT, Ultrasound and MRI are read by the radiologist. Plain radiographic images are visualized and the radiologist interpretations are reviewed as follows:         Interpretation per the Radiologist below, if available at the time of this note:    Babar (Final result)   Result time 02/05/18 12:56:06   Final result by Breanna Strange MD (02/05/18 12:56:06)                Impression:    1. No acute infective or inflammatory process. 2. No urinary tract calculi. 3. No bowel obstruction. Narrative:    EXAMINATION:  CT OF THE ABDOMEN AND PELVIS WITHOUT CONTRAST 2/5/2018 12:48 pm    TECHNIQUE:  CT of the abdomen and pelvis was performed without the administration of  intravenous contrast. Multiplanar reformatted images are provided for review. Dose modulation, iterative reconstruction, and/or weight based adjustment of  the mA/kV was utilized to reduce the radiation dose to as low as reasonably  achievable. COMPARISON:  No previous available.     HISTORY:  ORDERING SYSTEM PROVIDED HISTORY: Nausea, vomiting  TECHNOLOGIST PROVIDED HISTORY:  WITHOUT Contrast  Ordering Physician Provided Reason for Exam: Nausea, vomiting, and diarrhea  for 1 week. History of hysterectomy. Acuity: Acute  Type of Exam: Initial    FINDINGS:  Lower Chest: Partially imaged scarring in the middle lobe. Organs: Liver, spleen, pancreas, kidneys, adrenal glands and gallbladder show  no significant abnormalities.  No renal, ureteric or bladder calculi. GI/Bowel: There is limited evaluation due to absence of oral contrast.  Sliding hiatal hernia.  No bowel obstruction.  Normal appendix is with a tip  appendicolith.  Colonic diverticulosis without evidence for acute process. Pelvis: Hysterectomy.  Urinary bladder grossly normal.    Peritoneum/Retroperitoneum: Atherosclerotic disease.  No ascites or  significant lymphadenopathy. Bones/Soft Tissues: Diffuse degenerative changes.  No acute abnormality of  the bones. The superficial soft tissues show no significant abnormalities.                   LABS:  Results for orders placed or performed during the hospital encounter of 02/05/18   Lipase   Result Value Ref Range    Lipase 38 13 - 60 U/L   Hepatic Function Panel   Result Value Ref Range    Alb 4.2 3.5 - 5.2 g/dL    Alkaline Phosphatase 90 35 - 104 U/L    ALT 16 5 - 33 U/L    AST 16 <32 U/L    Total Bilirubin 0.36 0.3 - 1.2 mg/dL    Bilirubin, Direct 0.10 <0.31 mg/dL    Bilirubin, Indirect 0.26 0.00 - 1.00 mg/dL    Total Protein 7.3 6.4 - 8.3 g/dL    Globulin 3.1 1.5 - 3.8 g/dL    Albumin/Globulin Ratio 1.4 1.0 - 2.5   Amylase   Result Value Ref Range    Amylase 62 28 - 100 U/L   Basic Metabolic Panel   Result Value Ref Range    Glucose 108 (H) 70 - 99 mg/dL    BUN 13 8 - 23 mg/dL    CREATININE 0.79 0.50 - 0.90 mg/dL    Bun/Cre Ratio 16 9 - 20    Calcium 9.8 8.6 - 10.4 mg/dL    Sodium 142 135 - 144 mmol/L    Potassium 3.7 3.7 - 5.3 mmol/L    Chloride 101 98 - 107 mmol/L    CO2 25 20 - 31 mmol/L    Anion questions and given discharge instructions. They voiced understanding of these instructions and did not have any further questions or complaints. CONSULTS:  Dr. Mine Stringer is requesting that we have the patient working this up as an outpatient given the unremarkable workup and the fact that the patient is hemodynamically stable    PROCEDURES:  None    FINAL IMPRESSION      1. Nausea and vomiting, intractability of vomiting not specified, unspecified vomiting type          DISPOSITION/PLAN   DISPOSITION        CONDITION ON DISPOSITION:   Stable    PATIENT REFERRED TO:  Aquiles Garvin MD  [facility] [phone]    Call in 1 day        DISCHARGE MEDICATIONS:  New Prescriptions    ONDANSETRON (ZOFRAN) 4 MG TABLET    Take 1 tablet by mouth every 8 hours as needed for Nausea       (Please note that portions of this note were completed with a voice recognition program.  Efforts were made to edit the dictations but occasionally words are mis-transcribed.)    Jameson MD, F.A.C.E.P.   Attending Emergency Medicine Physician       Ruba Guadarrama MD  02/05/18 [id number]

## 2018-02-05 NOTE — ED TRIAGE NOTES
Patient with diarrhea and nausea for several days. Patient also had  Hypotension in the past week and was told per PCP to stop Hypertensive meds at this time. Patient not able to eat or drink. Patient with continued pain to rt knee S/P knee replacement  6 weeks ago.

## 2018-02-28 ENCOUNTER — OFFICE VISIT (OUTPATIENT)
Dept: PULMONOLOGY | Age: 73
End: 2018-02-28
Payer: MEDICARE

## 2018-02-28 VITALS
WEIGHT: 146 LBS | HEART RATE: 72 BPM | TEMPERATURE: 96.4 F | HEIGHT: 66 IN | BODY MASS INDEX: 23.46 KG/M2 | OXYGEN SATURATION: 99 % | SYSTOLIC BLOOD PRESSURE: 132 MMHG | DIASTOLIC BLOOD PRESSURE: 70 MMHG

## 2018-02-28 DIAGNOSIS — B44.9 ASPERGILLUS (HCC): ICD-10-CM

## 2018-02-28 DIAGNOSIS — A31.0 PULMONARY MAI (MYCOBACTERIUM AVIUM-INTRACELLULARE) INFECTION (HCC): Primary | ICD-10-CM

## 2018-02-28 DIAGNOSIS — Z86.19: ICD-10-CM

## 2018-02-28 PROCEDURE — 99213 OFFICE O/P EST LOW 20 MIN: CPT | Performed by: NURSE PRACTITIONER

## 2018-02-28 ASSESSMENT — ENCOUNTER SYMPTOMS
EYES NEGATIVE: 1
GASTROINTESTINAL NEGATIVE: 1
ALLERGIC/IMMUNOLOGIC NEGATIVE: 1
RESPIRATORY NEGATIVE: 1

## 2018-02-28 NOTE — PROGRESS NOTES
gallops  Abdomen - soft, nontender, nondistended, no masses or organomegaly  Neurological - alert, oriented, normal speech, no focal findings or movement disorder noted}  Extremities - peripheral pulses normal, no pedal edema, no clubbing or cyanosis  Skin - normal coloration and turgor, no rashes, no suspicious skin lesions noted     Wt Readings from Last 3 Encounters:   02/28/18 146 lb (66.2 kg)   02/05/18 142 lb (64.4 kg)   02/01/18 145 lb (65.8 kg)       Results for orders placed or performed during the hospital encounter of 02/05/18   Lipase   Result Value Ref Range    Lipase 38 13 - 60 U/L   Hepatic Function Panel   Result Value Ref Range    Alb 4.2 3.5 - 5.2 g/dL    Alkaline Phosphatase 90 35 - 104 U/L    ALT 16 5 - 33 U/L    AST 16 <32 U/L    Total Bilirubin 0.36 0.3 - 1.2 mg/dL    Bilirubin, Direct 0.10 <0.31 mg/dL    Bilirubin, Indirect 0.26 0.00 - 1.00 mg/dL    Total Protein 7.3 6.4 - 8.3 g/dL    Globulin 3.1 1.5 - 3.8 g/dL    Albumin/Globulin Ratio 1.4 1.0 - 2.5   Amylase   Result Value Ref Range    Amylase 62 28 - 100 U/L   Basic Metabolic Panel   Result Value Ref Range    Glucose 108 (H) 70 - 99 mg/dL    BUN 13 8 - 23 mg/dL    CREATININE 0.79 0.50 - 0.90 mg/dL    Bun/Cre Ratio 16 9 - 20    Calcium 9.8 8.6 - 10.4 mg/dL    Sodium 142 135 - 144 mmol/L    Potassium 3.7 3.7 - 5.3 mmol/L    Chloride 101 98 - 107 mmol/L    CO2 25 20 - 31 mmol/L    Anion Gap 16 9 - 17 mmol/L    GFR Non-African American >60 >60 mL/min    GFR African American >60 >60 mL/min    GFR Comment          GFR Staging NOT REPORTED    CBC Auto Differential   Result Value Ref Range    WBC 7.0 3.5 - 11.0 k/uL    RBC 4.48 4.0 - 5.2 m/uL    Hemoglobin 12.4 12.0 - 16.0 g/dL    Hematocrit 37.8 36 - 46 %    MCV 84.6 80 - 100 fL    MCH 27.8 26 - 34 pg    MCHC 32.9 31 - 37 g/dL    RDW 11.9 11.0 - 14.5 %    Platelets 477 767 - 648 k/uL    MPV 8.0 6.0 - 12.0 fL    NRBC Automated NOT REPORTED per 100 WBC    Differential Type NOT REPORTED     Immature Granulocytes NOT REPORTED 0 %    Absolute Immature Granulocyte NOT REPORTED 0.00 - 0.30 k/uL    WBC Morphology NOT REPORTED     RBC Morphology NOT REPORTED     Platelet Estimate NOT REPORTED     Seg Neutrophils 77 43 - 77 %    Lymphocytes 13 (L) 16 - 46 %    Monocytes 6 4 - 11 %    Eosinophils % 4 1 - 7 %    Basophils 0 0 - 1 %    Segs Absolute 5.50 1.8 - 7.7 k/uL    Absolute Lymph # 0.88 (L) 1.0 - 4.8 k/uL    Absolute Mono # 0.39 0.1 - 1.2 k/uL    Absolute Eos # 0.28 0.0 - 0.4 k/uL    Basophils # 0.02 0.0 - 0.2 k/uL   Troponin   Result Value Ref Range    Troponin T <0.03 <0.03 ng/mL    Troponin Interp         UA W/REFLEX CULTURE   Result Value Ref Range    Color, UA NOT REPORTED YEL    Turbidity UA NOT REPORTED CLEAR    Glucose, Ur NEGATIVE NEG    Bilirubin Urine NEGATIVE NEG    Ketones, Urine NEGATIVE NEG    Specific Gravity, UA 1.025 1.010 - 1.025    Urine Hgb NEGATIVE NEG    pH, UA 5.5 5.0 - 6.0    Protein, UA NEGATIVE NEG    Urobilinogen, Urine Normal NORM    Nitrite, Urine NEGATIVE NEG    Leukocyte Esterase, Urine NEGATIVE NEG    Urinalysis Comments NOT REPORTED    Lactic Acid   Result Value Ref Range    Lactic Acid 0.9 0.5 - 2.2 mmol/L   Microscopic Urinalysis   Result Value Ref Range    -          WBC, UA None 0 - 4 /HPF    RBC, UA None 0 - 4 /HPF    Casts UA NOT REPORTED 0 - 2 /LPF    Crystals UA NOT REPORTED NONE /HPF    Epithelial Cells UA 0 TO 4 0 - 5 /HPF    Renal Epithelial, Urine NOT REPORTED 0 /HPF    Bacteria, UA None NONE    Mucus, UA NOT REPORTED NONE    Trichomonas, UA NOT REPORTED NONE    Amorphous, UA NOT REPORTED NONE    Other Observations UA NOT REPORTED NREQ    Yeast, UA NOT REPORTED NONE   EKG 12 Lead   Result Value Ref Range    Ventricular Rate 85 BPM    Atrial Rate 74 BPM    P-R Interval 154 ms    QRS Duration 70 ms    Q-T Interval 406 ms    QTc Calculation (Bazett) 483 ms    P Axis 53 degrees    R Axis -17 degrees    T Axis 55 degrees       Ct Abdomen Pelvis Wo Contrast    Result Date: 2/5/2018  EXAMINATION: CT OF THE ABDOMEN AND PELVIS WITHOUT CONTRAST 2/5/2018 12:48 pm TECHNIQUE: CT of the abdomen and pelvis was performed without the administration of intravenous contrast. Multiplanar reformatted images are provided for review. Dose modulation, iterative reconstruction, and/or weight based adjustment of the mA/kV was utilized to reduce the radiation dose to as low as reasonably achievable. COMPARISON: No previous available. HISTORY: ORDERING SYSTEM PROVIDED HISTORY: Nausea, vomiting TECHNOLOGIST PROVIDED HISTORY: WITHOUT Contrast Ordering Physician Provided Reason for Exam: Nausea, vomiting, and diarrhea for 1 week. History of hysterectomy. Acuity: Acute Type of Exam: Initial FINDINGS: Lower Chest: Partially imaged scarring in the middle lobe. Organs: Liver, spleen, pancreas, kidneys, adrenal glands and gallbladder show no significant abnormalities. No renal, ureteric or bladder calculi. GI/Bowel: There is limited evaluation due to absence of oral contrast. Sliding hiatal hernia. No bowel obstruction. Normal appendix is with a tip appendicolith. Colonic diverticulosis without evidence for acute process. Pelvis: Hysterectomy. Urinary bladder grossly normal. Peritoneum/Retroperitoneum: Atherosclerotic disease. No ascites or significant lymphadenopathy. Bones/Soft Tissues: Diffuse degenerative changes. No acute abnormality of the bones. The superficial soft tissues show no significant abnormalities. 1. No acute infective or inflammatory process. 2. No urinary tract calculi. 3. No bowel obstruction. Assessment:      1. 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 Aspergillus (HCC)colonization of airways     3. History of atypical mycobacterial infection          Plan:      1. Off all SAMM medications (azithomycin, ethambutol, rifampin, completed 2 year course)  2. Call for follow up if develops symptoms of recurrent disease.

## 2018-05-07 ENCOUNTER — TELEPHONE (OUTPATIENT)
Dept: INTERNAL MEDICINE | Age: 73
End: 2018-05-07

## 2018-05-08 ENCOUNTER — OFFICE VISIT (OUTPATIENT)
Dept: INTERNAL MEDICINE | Age: 73
End: 2018-05-08
Payer: MEDICARE

## 2018-05-08 ENCOUNTER — HOSPITAL ENCOUNTER (OUTPATIENT)
Dept: LAB | Age: 73
Setting detail: SPECIMEN
Discharge: HOME | End: 2018-05-08
Payer: MEDICARE

## 2018-05-08 VITALS
BODY MASS INDEX: 23.63 KG/M2 | WEIGHT: 147 LBS | DIASTOLIC BLOOD PRESSURE: 72 MMHG | RESPIRATION RATE: 16 BRPM | HEART RATE: 68 BPM | HEIGHT: 66 IN | SYSTOLIC BLOOD PRESSURE: 136 MMHG

## 2018-05-08 DIAGNOSIS — E03.9 HYPOTHYROIDISM, UNSPECIFIED TYPE: ICD-10-CM

## 2018-05-08 DIAGNOSIS — I10 ESSENTIAL HYPERTENSION: ICD-10-CM

## 2018-05-08 DIAGNOSIS — J47.9 BRONCHIECTASIS WITHOUT COMPLICATION (HCC): ICD-10-CM

## 2018-05-08 DIAGNOSIS — D64.9 ANEMIA, UNSPECIFIED TYPE: ICD-10-CM

## 2018-05-08 DIAGNOSIS — R60.0 LEG EDEMA, RIGHT: ICD-10-CM

## 2018-05-08 DIAGNOSIS — E03.9 ACQUIRED HYPOTHYROIDISM: ICD-10-CM

## 2018-05-08 DIAGNOSIS — E78.00 PURE HYPERCHOLESTEROLEMIA: ICD-10-CM

## 2018-05-08 DIAGNOSIS — R21 RASH: Primary | ICD-10-CM

## 2018-05-08 DIAGNOSIS — E78.5 HYPERLIPIDEMIA, UNSPECIFIED HYPERLIPIDEMIA TYPE: ICD-10-CM

## 2018-05-08 LAB
ALBUMIN SERPL-MCNC: 4.4 G/DL (ref 3.5–5.2)
ALBUMIN/GLOBULIN RATIO: 1.4 (ref 1–2.5)
ALP BLD-CCNC: 110 U/L (ref 35–104)
ALT SERPL-CCNC: 29 U/L (ref 5–33)
ANION GAP SERPL CALCULATED.3IONS-SCNC: 9 MMOL/L (ref 9–17)
AST SERPL-CCNC: 24 U/L
BILIRUB SERPL-MCNC: 0.45 MG/DL (ref 0.3–1.2)
BUN BLDV-MCNC: 22 MG/DL (ref 8–23)
BUN/CREAT BLD: 27 (ref 9–20)
CALCIUM SERPL-MCNC: 9.8 MG/DL (ref 8.6–10.4)
CHLORIDE BLD-SCNC: 104 MMOL/L (ref 98–107)
CHOLESTEROL/HDL RATIO: 2.3
CHOLESTEROL: 173 MG/DL
CO2: 31 MMOL/L (ref 20–31)
CREAT SERPL-MCNC: 0.83 MG/DL (ref 0.5–0.9)
GFR AFRICAN AMERICAN: >60 ML/MIN
GFR NON-AFRICAN AMERICAN: >60 ML/MIN
GFR SERPL CREATININE-BSD FRML MDRD: ABNORMAL ML/MIN/{1.73_M2}
GFR SERPL CREATININE-BSD FRML MDRD: ABNORMAL ML/MIN/{1.73_M2}
GLUCOSE BLD-MCNC: 95 MG/DL (ref 70–99)
HDLC SERPL-MCNC: 75 MG/DL
HEMOGLOBIN: 12.9 G/DL (ref 12–16)
IRON SATURATION: 24 % (ref 20–55)
IRON: 76 UG/DL (ref 37–145)
LDL CHOLESTEROL: 82 MG/DL (ref 0–130)
POTASSIUM SERPL-SCNC: 4.1 MMOL/L (ref 3.7–5.3)
SODIUM BLD-SCNC: 144 MMOL/L (ref 135–144)
TOTAL IRON BINDING CAPACITY: 319 UG/DL (ref 250–450)
TOTAL PROTEIN: 7.5 G/DL (ref 6.4–8.3)
TRIGL SERPL-MCNC: 78 MG/DL
TSH SERPL DL<=0.05 MIU/L-ACNC: 2.43 MIU/L (ref 0.3–5)
UNSATURATED IRON BINDING CAPACITY: 243 UG/DL (ref 112–347)
VLDLC SERPL CALC-MCNC: NORMAL MG/DL (ref 1–30)

## 2018-05-08 PROCEDURE — 36415 COLL VENOUS BLD VENIPUNCTURE: CPT

## 2018-05-08 PROCEDURE — 80061 LIPID PANEL: CPT

## 2018-05-08 PROCEDURE — 99214 OFFICE O/P EST MOD 30 MIN: CPT | Performed by: INTERNAL MEDICINE

## 2018-05-08 PROCEDURE — 84443 ASSAY THYROID STIM HORMONE: CPT

## 2018-05-08 PROCEDURE — 83550 IRON BINDING TEST: CPT

## 2018-05-08 PROCEDURE — 80053 COMPREHEN METABOLIC PANEL: CPT

## 2018-05-08 PROCEDURE — 83540 ASSAY OF IRON: CPT

## 2018-05-08 PROCEDURE — 85018 HEMOGLOBIN: CPT

## 2018-05-08 RX ORDER — METHYLPREDNISOLONE 4 MG/1
TABLET ORAL
Qty: 1 KIT | Refills: 0 | Status: SHIPPED | OUTPATIENT
Start: 2018-05-08 | End: 2018-05-14

## 2018-05-08 ASSESSMENT — ENCOUNTER SYMPTOMS
ABDOMINAL PAIN: 0
DIARRHEA: 0
CONSTIPATION: 0
BLOOD IN STOOL: 0
EYE PAIN: 0
COUGH: 0
BACK PAIN: 0
NAUSEA: 0
VOMITING: 0
SHORTNESS OF BREATH: 0

## 2018-05-25 ENCOUNTER — TELEPHONE (OUTPATIENT)
Dept: INTERNAL MEDICINE | Age: 73
End: 2018-05-25

## 2018-05-25 RX ORDER — METHYLPREDNISOLONE 4 MG/1
TABLET ORAL
Qty: 1 KIT | Refills: 0 | Status: SHIPPED | OUTPATIENT
Start: 2018-05-25 | End: 2018-05-31

## 2018-05-29 ENCOUNTER — OFFICE VISIT (OUTPATIENT)
Dept: CARDIOLOGY | Age: 73
End: 2018-05-29
Payer: MEDICARE

## 2018-05-29 VITALS
HEIGHT: 66 IN | WEIGHT: 149 LBS | HEART RATE: 66 BPM | DIASTOLIC BLOOD PRESSURE: 60 MMHG | BODY MASS INDEX: 23.95 KG/M2 | SYSTOLIC BLOOD PRESSURE: 122 MMHG

## 2018-05-29 DIAGNOSIS — I49.3 PVC'S (PREMATURE VENTRICULAR CONTRACTIONS): ICD-10-CM

## 2018-05-29 DIAGNOSIS — Z96.651 S/P TOTAL KNEE REPLACEMENT, RIGHT: ICD-10-CM

## 2018-05-29 DIAGNOSIS — R94.31 ABNORMAL ECG: Primary | ICD-10-CM

## 2018-05-29 DIAGNOSIS — R06.09 DOE (DYSPNEA ON EXERTION): ICD-10-CM

## 2018-05-29 PROCEDURE — 99213 OFFICE O/P EST LOW 20 MIN: CPT | Performed by: INTERNAL MEDICINE

## 2018-06-05 ENCOUNTER — OFFICE VISIT (OUTPATIENT)
Dept: PRIMARY CARE CLINIC | Age: 73
End: 2018-06-05
Payer: MEDICARE

## 2018-06-05 VITALS
SYSTOLIC BLOOD PRESSURE: 120 MMHG | BODY MASS INDEX: 24.59 KG/M2 | TEMPERATURE: 98 F | HEART RATE: 68 BPM | DIASTOLIC BLOOD PRESSURE: 74 MMHG | OXYGEN SATURATION: 98 % | WEIGHT: 151.2 LBS

## 2018-06-05 DIAGNOSIS — L30.9 DERMATITIS: Primary | ICD-10-CM

## 2018-06-05 PROCEDURE — 99213 OFFICE O/P EST LOW 20 MIN: CPT | Performed by: FAMILY MEDICINE

## 2018-06-05 RX ORDER — CLOBETASOL PROPIONATE 0.5 MG/G
CREAM TOPICAL
Qty: 1 TUBE | Refills: 1 | Status: SHIPPED | OUTPATIENT
Start: 2018-06-05 | End: 2019-04-17 | Stop reason: ALTCHOICE

## 2018-06-05 RX ORDER — DOXYCYCLINE HYCLATE 100 MG/1
100 CAPSULE ORAL 2 TIMES DAILY
Qty: 20 CAPSULE | Refills: 0 | Status: SHIPPED | OUTPATIENT
Start: 2018-06-05 | End: 2018-06-15

## 2018-06-07 DIAGNOSIS — E03.9 ACQUIRED HYPOTHYROIDISM: Primary | ICD-10-CM

## 2018-06-07 RX ORDER — LEVOTHYROXINE SODIUM 0.05 MG/1
TABLET ORAL
Qty: 90 TABLET | Refills: 3 | Status: SHIPPED | OUTPATIENT
Start: 2018-06-07 | End: 2019-06-03 | Stop reason: SDUPTHER

## 2018-06-11 ENCOUNTER — OFFICE VISIT (OUTPATIENT)
Dept: INTERNAL MEDICINE | Age: 73
End: 2018-06-11
Payer: MEDICARE

## 2018-06-11 VITALS
WEIGHT: 149 LBS | HEIGHT: 66 IN | BODY MASS INDEX: 23.95 KG/M2 | SYSTOLIC BLOOD PRESSURE: 126 MMHG | HEART RATE: 76 BPM | DIASTOLIC BLOOD PRESSURE: 70 MMHG

## 2018-06-11 DIAGNOSIS — R21 RASH: Primary | ICD-10-CM

## 2018-06-11 PROCEDURE — 99213 OFFICE O/P EST LOW 20 MIN: CPT | Performed by: NURSE PRACTITIONER

## 2018-06-11 RX ORDER — PREDNISONE 20 MG/1
TABLET ORAL
Qty: 10 TABLET | Refills: 0 | Status: SHIPPED | OUTPATIENT
Start: 2018-06-11 | End: 2018-12-12 | Stop reason: ALTCHOICE

## 2018-06-11 RX ORDER — TRIAMCINOLONE ACETONIDE 40 MG/ML
60 INJECTION, SUSPENSION INTRA-ARTICULAR; INTRAMUSCULAR ONCE
Status: COMPLETED | OUTPATIENT
Start: 2018-06-11 | End: 2018-06-11

## 2018-06-11 RX ORDER — AMLODIPINE BESYLATE 10 MG/1
TABLET ORAL
Qty: 90 TABLET | Refills: 3 | Status: SHIPPED | OUTPATIENT
Start: 2018-06-11 | End: 2019-06-09 | Stop reason: SDUPTHER

## 2018-06-11 RX ORDER — NAPROXEN 500 MG/1
500 TABLET ORAL 2 TIMES DAILY WITH MEALS
COMMUNITY
End: 2018-12-12

## 2018-06-11 ASSESSMENT — ENCOUNTER SYMPTOMS
SORE THROAT: 0
COUGH: 0
WHEEZING: 0

## 2018-06-27 ASSESSMENT — ENCOUNTER SYMPTOMS: COLOR CHANGE: 1

## 2018-07-02 ENCOUNTER — OFFICE VISIT (OUTPATIENT)
Dept: PRIMARY CARE CLINIC | Age: 73
End: 2018-07-02
Payer: MEDICARE

## 2018-07-02 VITALS
OXYGEN SATURATION: 98 % | SYSTOLIC BLOOD PRESSURE: 110 MMHG | DIASTOLIC BLOOD PRESSURE: 68 MMHG | HEART RATE: 88 BPM | BODY MASS INDEX: 23.93 KG/M2 | WEIGHT: 146 LBS

## 2018-07-02 DIAGNOSIS — M54.42 ACUTE LEFT-SIDED LOW BACK PAIN WITH LEFT-SIDED SCIATICA: Primary | ICD-10-CM

## 2018-07-02 PROCEDURE — 99213 OFFICE O/P EST LOW 20 MIN: CPT | Performed by: NURSE PRACTITIONER

## 2018-07-02 ASSESSMENT — ENCOUNTER SYMPTOMS
BOWEL INCONTINENCE: 0
RESPIRATORY NEGATIVE: 1
BACK PAIN: 1
ABDOMINAL PAIN: 0

## 2018-07-02 ASSESSMENT — PATIENT HEALTH QUESTIONNAIRE - PHQ9
SUM OF ALL RESPONSES TO PHQ QUESTIONS 1-9: 0
2. FEELING DOWN, DEPRESSED OR HOPELESS: 0
1. LITTLE INTEREST OR PLEASURE IN DOING THINGS: 0
SUM OF ALL RESPONSES TO PHQ9 QUESTIONS 1 & 2: 0

## 2018-07-02 NOTE — PROGRESS NOTES
 PVC's (premature ventricular contractions)    DELVALLE (dyspnea on exertion)        Current medications are:  Current Outpatient Prescriptions   Medication Sig Dispense Refill    amLODIPine (NORVASC) 10 MG tablet TAKE 1 TABLET DAILY 90 tablet 3    naproxen (NAPROSYN) 500 MG tablet Take 500 mg by mouth 2 times daily (with meals)      levothyroxine (SYNTHROID) 50 MCG tablet TAKE 1 TABLET DAILY 90 tablet 3    clobetasol (TEMOVATE) 0.05 % cream Apply topically to affected area 2 times daily for up to 10 days. 1 Tube 1    oxybutynin (DITROPAN-XL) 10 MG extended release tablet TAKE 1 TABLET DAILY 90 tablet 3    atorvastatin (LIPITOR) 40 MG tablet Take 1 tablet by mouth daily 90 tablet 3    metoprolol tartrate (LOPRESSOR) 25 MG tablet Take 1 tablet by mouth 2 times daily 180 tablet 3    Multiple Vitamins-Minerals (MULTIVITAMIN PO) Take 2 tablets by mouth every morning       predniSONE (DELTASONE) 20 MG tablet One tab twice a day for three days then one tab daily for three days then 0.5 tab daily for two days 10 tablet 0     No current facility-administered medications for this visit. She is allergic to levaquin [levofloxacin in d5w]; iodine; and penicillins. .    She  reports that she quit smoking about 58 years ago. Her smoking use included Cigarettes. She has a 3.00 pack-year smoking history. She has never used smokeless tobacco.      Objective:    Vitals:    07/02/18 1153   BP: 110/68   Pulse: 88   SpO2: 98%     Body mass index is 23.93 kg/m². Review of Systems   Constitutional: Positive for appetite change. Negative for chills, fatigue and fever. Respiratory: Negative. Cardiovascular: Negative. Gastrointestinal: Negative for abdominal pain and bowel incontinence. Genitourinary: Negative for bladder incontinence, dysuria and pelvic pain. Musculoskeletal: Positive for back pain. Neurological: Negative for tingling and weakness.        Physical Exam   Constitutional: She is oriented to person, place, and time. She appears well-developed and well-nourished. HENT:   Head: Normocephalic. Eyes: Pupils are equal, round, and reactive to light. Neck: Normal range of motion. Neck supple. Cardiovascular: Normal rate, regular rhythm and normal heart sounds. Pulmonary/Chest: Effort normal and breath sounds normal.   Musculoskeletal:        Lumbar back: She exhibits decreased range of motion (increased pain with extension) and pain. She exhibits no bony tenderness, no swelling, no edema and no spasm. Back:    Neurological: She is alert and oriented to person, place, and time. Skin: Skin is warm and dry. Psychiatric: She has a normal mood and affect. Her behavior is normal. Thought content normal.   Nursing note and vitals reviewed. Assessment and Plan:     Diagnosis Orders   1. Acute left-sided low back pain with left-sided sciatica  External Referral To Physical Therapy     Take Tylenol or Aleve as needed for pain. We discussed steroid burst for inflammation, however patient was treated with oral and IM steroids several times over the last 3 weeks for a rash. I referred patient to physical therapy. Patient is to follow up with pain management, appointment is not until September. Follow up with PCP if symptoms persist or worsen.      Electronically signed by ASTRID Alvarez CNP on 7/2/18 at 12:02 PM

## 2018-07-09 ENCOUNTER — TELEPHONE (OUTPATIENT)
Dept: INTERNAL MEDICINE | Age: 73
End: 2018-07-09

## 2018-07-10 ENCOUNTER — TELEPHONE (OUTPATIENT)
Dept: INTERNAL MEDICINE | Age: 73
End: 2018-07-10

## 2018-07-10 ENCOUNTER — OFFICE VISIT (OUTPATIENT)
Dept: INTERNAL MEDICINE | Age: 73
End: 2018-07-10
Payer: MEDICARE

## 2018-07-10 VITALS
HEIGHT: 66 IN | HEART RATE: 64 BPM | WEIGHT: 151 LBS | DIASTOLIC BLOOD PRESSURE: 82 MMHG | BODY MASS INDEX: 24.27 KG/M2 | SYSTOLIC BLOOD PRESSURE: 134 MMHG | RESPIRATION RATE: 16 BRPM

## 2018-07-10 DIAGNOSIS — G89.29 CHRONIC MIDLINE LOW BACK PAIN WITH LEFT-SIDED SCIATICA: Primary | ICD-10-CM

## 2018-07-10 DIAGNOSIS — E78.00 PURE HYPERCHOLESTEROLEMIA: ICD-10-CM

## 2018-07-10 DIAGNOSIS — M54.42 CHRONIC MIDLINE LOW BACK PAIN WITH LEFT-SIDED SCIATICA: Primary | ICD-10-CM

## 2018-07-10 DIAGNOSIS — M62.830 MUSCLE SPASM OF BACK: ICD-10-CM

## 2018-07-10 DIAGNOSIS — I10 ESSENTIAL HYPERTENSION: ICD-10-CM

## 2018-07-10 PROCEDURE — 99214 OFFICE O/P EST MOD 30 MIN: CPT | Performed by: INTERNAL MEDICINE

## 2018-07-10 RX ORDER — TRAMADOL HYDROCHLORIDE 50 MG/1
50 TABLET ORAL EVERY 6 HOURS PRN
Qty: 90 TABLET | Refills: 2 | Status: SHIPPED | OUTPATIENT
Start: 2018-07-10 | End: 2018-07-20

## 2018-07-10 RX ORDER — METHYLPREDNISOLONE 4 MG/1
TABLET ORAL
Qty: 1 KIT | Refills: 0 | Status: CANCELLED | OUTPATIENT
Start: 2018-07-10 | End: 2018-07-16

## 2018-07-10 RX ORDER — CYCLOBENZAPRINE HCL 10 MG
10 TABLET ORAL 3 TIMES DAILY PRN
Qty: 90 TABLET | Refills: 2 | Status: SHIPPED | OUTPATIENT
Start: 2018-07-10 | End: 2018-07-20

## 2018-07-10 ASSESSMENT — ENCOUNTER SYMPTOMS
BLOOD IN STOOL: 0
COUGH: 0
VOMITING: 0
DIARRHEA: 0
NAUSEA: 0
ABDOMINAL PAIN: 0
BACK PAIN: 1
SHORTNESS OF BREATH: 0
EYE PAIN: 0
CONSTIPATION: 0

## 2018-07-10 NOTE — TELEPHONE ENCOUNTER
Patient has iodine allergy listed, which would go along with CT dyes and IVP dyes, but I believe the MRIs still use gadolinium which is not iodine-based.   I believe MRI still use gadolinium as the contrast --not iodine based contrast.     please call radiology department and clarify before we change to a study that will give less useful information

## 2018-07-10 NOTE — PROGRESS NOTES
disease     Hyperlipidemia     Hyperreflexic bladder     Hypertension     Hypothyroidism     Primary osteoarthritis of right knee 12/20/2017      Past Surgical History:   Procedure Laterality Date    BREAST SURGERY Left 2012    core biopsy    BRONCHOSCOPY Left 6/19/2012    COLONOSCOPY  9/13    SIS 10 y    HYSTERECTOMY  1983    and BSO    OTHER SURGICAL HISTORY Right 4/21/11    right L4 Transforaminal epidural steroid injection    OTHER SURGICAL HISTORY Right 10/2/15    right L4 TFE    MS TOTAL KNEE ARTHROPLASTY Right 12/19/2017    KNEE TOTAL ARTHROPLASTY RIGHT WITH WOO & NEPHEW  AQUAMANTIS   PLATELET GEL performed by Olena Braun DO at [address] TOTAL KNEE ARTHROPLASTY Right 12/19/2017       Family History   Problem Relation Age of Onset    Other Mother         Knee pain       Social History   Substance Use Topics    Smoking status: Former Smoker     Packs/day: 0.50     Years: 6.00     Types: Cigarettes     Quit date: 5/25/1960    Smokeless tobacco: Never Used      Comment: quit 35-40 years ago    Alcohol use No      Current Outpatient Prescriptions   Medication Sig Dispense Refill    cyclobenzaprine (FLEXERIL) 10 MG tablet Take 1 tablet by mouth 3 times daily as needed for Muscle spasms 90 tablet 2    traMADol (ULTRAM) 50 MG tablet Take 1 tablet by mouth every 6 hours as needed for Pain for up to 10 days. . 90 tablet 2    amLODIPine (NORVASC) 10 MG tablet TAKE 1 TABLET DAILY 90 tablet 3    naproxen (NAPROSYN) 500 MG tablet Take 500 mg by mouth 2 times daily (with meals)      predniSONE (DELTASONE) 20 MG tablet One tab twice a day for three days then one tab daily for three days then 0.5 tab daily for two days 10 tablet 0    levothyroxine (SYNTHROID) 50 MCG tablet TAKE 1 TABLET DAILY 90 tablet 3    clobetasol (TEMOVATE) 0.05 % cream Apply topically to affected area 2 times daily for up to 10 days.  1 Tube 1    oxybutynin (DITROPAN-XL) 10 MG extended release tablet TAKE 1 TABLET DAILY 90 tablet 3    atorvastatin (LIPITOR) 40 MG tablet Take 1 tablet by mouth daily 90 tablet 3    metoprolol tartrate (LOPRESSOR) 25 MG tablet Take 1 tablet by mouth 2 times daily 180 tablet 3    Multiple Vitamins-Minerals (MULTIVITAMIN PO) Take 2 tablets by mouth every morning        No current facility-administered medications for this visit. Allergies   Allergen Reactions    Levaquin [Levofloxacin In D5w] Other (See Comments)     Attacked muscles and tendons    Iodine Other (See Comments)     Cantu       Penicillins Other (See Comments)     abcess at site of injection       Health Maintenance   Topic Date Due    DTaP/Tdap/Td vaccine (1 - Tdap) 04/16/1962    Shingles Vaccine (1 of 2 - 2 Dose Series) 04/16/1993    Pneumococcal low/med risk (2 of 2 - PPSV23) 12/03/2015    DEXA (modify frequency per FRAX score)  11/10/2017    Flu vaccine (1) 09/01/2018    TSH testing  05/08/2019    Lipid screen  05/08/2023    Colon cancer screen colonoscopy  09/06/2023       Subjective:      Review of Systems   Constitutional: Negative for chills and fever. HENT: Negative for hearing loss. Eyes: Negative for pain and visual disturbance. Respiratory: Negative for cough and shortness of breath. Cardiovascular: Negative for chest pain, palpitations and leg swelling. Gastrointestinal: Negative for abdominal pain, blood in stool, constipation, diarrhea, nausea and vomiting. Endocrine: Negative for cold intolerance, polydipsia and polyuria. Genitourinary: Negative for difficulty urinating, dysuria and hematuria. Musculoskeletal: Positive for back pain. Negative for arthralgias, gait problem and neck pain. Skin: Negative for pallor and rash. Neurological: Negative for dizziness, weakness, numbness and headaches. Hematological: Negative for adenopathy. Does not bruise/bleed easily. Psychiatric/Behavioral: Negative for confusion. The patient is not nervous/anxious.         Objective: Physical Exam   Constitutional: She is oriented to person, place, and time. She appears well-developed and well-nourished. HENT:   Head: Normocephalic and atraumatic. Eyes: EOM are normal. Pupils are equal, round, and reactive to light. Neck: Neck supple. Cardiovascular: Normal rate and regular rhythm. Exam reveals no gallop and no friction rub. No murmur heard. Pulmonary/Chest: Effort normal and breath sounds normal. She has no wheezes. She has no rales. Abdominal: Soft. She exhibits no distension and no mass. There is no tenderness. There is no rebound. Musculoskeletal: Normal range of motion. She exhibits no edema. Lymphadenopathy:     She has no cervical adenopathy. Neurological: She is alert and oriented to person, place, and time. No cranial nerve deficit (grossly). Skin: Skin is warm and dry. No rash noted. Psychiatric: She has a normal mood and affect. Thought content normal.   Vitals reviewed. /82 (Site: Right Arm, Position: Sitting, Cuff Size: Medium Adult)   Pulse 64   Resp 16   Ht 5' 5.5\" (1.664 m)   Wt 151 lb (68.5 kg)   LMP 08/15/1983 (Exact Date)   BMI 24.75 kg/m²     Assessment:       Diagnosis Orders   1. Chronic midline low back pain with left-sided sciatica  Creatinine, Serum    MRI LUMBAR SPINE W WO CONTRAST    cyclobenzaprine (FLEXERIL) 10 MG tablet    traMADol (ULTRAM) 50 MG tablet   2. Essential hypertension  Creatinine, Serum   3. Pure hypercholesterolemia  Creatinine, Serum   4. Muscle spasm of back  cyclobenzaprine (FLEXERIL) 10 MG tablet             Plan:      Return if symptoms worsen or fail to improve, for Hypertension, Hyperlipidemia, back pain.     Orders Placed This Encounter   Procedures    MRI LUMBAR SPINE W WO CONTRAST     Standing Status:   Future     Standing Expiration Date:   7/10/2019    Creatinine, Serum     Standing Status:   Future     Standing Expiration Date:   7/10/2019     Orders Placed This Encounter   Medications    cyclobenzaprine (FLEXERIL) 10 MG tablet     Sig: Take 1 tablet by mouth 3 times daily as needed for Muscle spasms     Dispense:  90 tablet     Refill:  2    traMADol (ULTRAM) 50 MG tablet     Sig: Take 1 tablet by mouth every 6 hours as needed for Pain for up to 10 days. .     Dispense:  90 tablet     Refill:  2       Patient given educational materials - see patient instructions. Discussed use, benefit, and side effects of prescribed medications. All patient questions answered. Pt voiced understanding. Reviewed health maintenance. Instructed to continue current medications, diet and exercise. Patient agreed with treatment plan. Follow up as directed.      Electronically signed by Gisela Miller MD on 7/10/2018 at 9:37 AM

## 2018-07-12 ENCOUNTER — TELEPHONE (OUTPATIENT)
Dept: INTERNAL MEDICINE | Age: 73
End: 2018-07-12

## 2018-07-12 DIAGNOSIS — M54.32 LEFT SIDED SCIATICA: Primary | ICD-10-CM

## 2018-07-12 RX ORDER — HYDROCODONE BITARTRATE AND ACETAMINOPHEN 5; 325 MG/1; MG/1
1 TABLET ORAL EVERY 8 HOURS PRN
Qty: 20 TABLET | Refills: 0 | Status: SHIPPED | OUTPATIENT
Start: 2018-07-12 | End: 2018-07-19

## 2018-07-12 NOTE — TELEPHONE ENCOUNTER
Will send in short supply of norco- she is to bring her ultram in to dispose of when she comes to pick her Rx up.  Stop flexeril and may start muscle rub- only short supply until MRI completed / resulted     Please pull [facility] for review

## 2018-07-19 ENCOUNTER — HOSPITAL ENCOUNTER (OUTPATIENT)
Dept: MRI IMAGING | Age: 73
Discharge: HOME | End: 2018-07-21
Payer: MEDICARE

## 2018-07-19 DIAGNOSIS — G89.29 CHRONIC MIDLINE LOW BACK PAIN WITH LEFT-SIDED SCIATICA: ICD-10-CM

## 2018-07-19 DIAGNOSIS — M54.42 CHRONIC MIDLINE LOW BACK PAIN WITH LEFT-SIDED SCIATICA: ICD-10-CM

## 2018-07-19 PROCEDURE — 72148 MRI LUMBAR SPINE W/O DYE: CPT

## 2018-07-20 ENCOUNTER — HOSPITAL ENCOUNTER (OUTPATIENT)
Dept: MRI IMAGING | Age: 73
Discharge: HOME | End: 2018-07-22
Payer: MEDICARE

## 2018-07-20 ENCOUNTER — TELEPHONE (OUTPATIENT)
Dept: INTERNAL MEDICINE | Age: 73
End: 2018-07-20

## 2018-07-20 DIAGNOSIS — M54.42 CHRONIC BILATERAL LOW BACK PAIN WITH BILATERAL SCIATICA: Primary | ICD-10-CM

## 2018-07-20 DIAGNOSIS — R93.7 ABNORMAL MRI, LUMBAR SPINE: ICD-10-CM

## 2018-07-20 DIAGNOSIS — G89.29 CHRONIC BILATERAL LOW BACK PAIN WITH BILATERAL SCIATICA: ICD-10-CM

## 2018-07-20 DIAGNOSIS — M54.42 CHRONIC BILATERAL LOW BACK PAIN WITH BILATERAL SCIATICA: ICD-10-CM

## 2018-07-20 DIAGNOSIS — M54.41 CHRONIC BILATERAL LOW BACK PAIN WITH BILATERAL SCIATICA: ICD-10-CM

## 2018-07-20 DIAGNOSIS — G89.29 CHRONIC BILATERAL LOW BACK PAIN WITH BILATERAL SCIATICA: Primary | ICD-10-CM

## 2018-07-20 DIAGNOSIS — M54.41 CHRONIC BILATERAL LOW BACK PAIN WITH BILATERAL SCIATICA: Primary | ICD-10-CM

## 2018-07-20 PROCEDURE — 72195 MRI PELVIS W/O DYE: CPT

## 2018-07-20 NOTE — TELEPHONE ENCOUNTER
Spoke with pt- all instructions given. She does not want to see Dr. Randy Long. She has a name of a neurosurgeon from a friend that she wants to see. Pt will still do MRI pelvis today. She will contact office with name of other provider.

## 2018-07-23 ENCOUNTER — TELEPHONE (OUTPATIENT)
Dept: INTERNAL MEDICINE | Age: 73
End: 2018-07-23

## 2018-07-23 DIAGNOSIS — S32.10XA CLOSED FRACTURE OF SACRUM, UNSPECIFIED PORTION OF SACRUM, INITIAL ENCOUNTER (HCC): ICD-10-CM

## 2018-07-23 DIAGNOSIS — M54.42 CHRONIC MIDLINE LOW BACK PAIN WITH LEFT-SIDED SCIATICA: Primary | ICD-10-CM

## 2018-07-23 DIAGNOSIS — G89.29 CHRONIC MIDLINE LOW BACK PAIN WITH LEFT-SIDED SCIATICA: Primary | ICD-10-CM

## 2018-08-08 ENCOUNTER — TELEPHONE (OUTPATIENT)
Dept: INTERNAL MEDICINE | Age: 73
End: 2018-08-08

## 2018-08-08 DIAGNOSIS — Z13.820 SCREENING FOR OSTEOPOROSIS: Primary | ICD-10-CM

## 2018-08-08 DIAGNOSIS — Z78.0 POST-MENOPAUSAL: ICD-10-CM

## 2018-08-08 NOTE — TELEPHONE ENCOUNTER
Saw Dr Ernie Pelaez in [place] [id number] surgeon. He suggests patient get a dexa scan. Could you please order that and get it shceduled for her. They are not recommending surgery at this time. Wants to see what dexa show and see what treatment may be needed.   [phone]

## 2018-08-14 ENCOUNTER — HOSPITAL ENCOUNTER (OUTPATIENT)
Dept: BONE DENSITY | Age: 73
Discharge: HOME | End: 2018-08-16
Payer: MEDICARE

## 2018-08-14 DIAGNOSIS — Z13.820 SCREENING FOR OSTEOPOROSIS: ICD-10-CM

## 2018-08-14 DIAGNOSIS — Z78.0 POST-MENOPAUSAL: ICD-10-CM

## 2018-08-14 PROCEDURE — 77080 DXA BONE DENSITY AXIAL: CPT

## 2018-09-15 ASSESSMENT — PROMIS GLOBAL HEALTH SCALE
IN THE PAST 7 DAYS, HOW WOULD YOU RATE YOUR PAIN ON AVERAGE [ON A SCALE FROM 0 (NO PAIN) TO 10 (WORST IMAGINABLE PAIN)]?: 3
WHO IS THE PERSON COMPLETING THE PROMIS V1.1 SURVEY?: 0
TO WHAT EXTENT ARE YOU ABLE TO CARRY OUT YOUR EVERYDAY PHYSICAL ACTIVITIES SUCH AS WALKING, CLIMBING STAIRS, CARRYING GROCERIES, OR MOVING A CHAIR [ON A SCALE OF 1 (NOT AT ALL) TO 5 (COMPLETELY)]?: 4
IN THE PAST 7 DAYS, HOW OFTEN HAVE YOU BEEN BOTHERED BY EMOTIONAL PROBLEMS, SUCH AS FEELING ANXIOUS, DEPRESSED, OR IRRITABLE [ON A SCALE FROM 1 (NEVER) TO 5 (ALWAYS)]?: 1
IN GENERAL, HOW WOULD YOU RATE YOUR PHYSICAL HEALTH [ON A SCALE OF 1 (POOR) TO 5 (EXCELLENT)]?: 4
IN THE PAST 7 DAYS, HOW WOULD YOU RATE YOUR FATIGUE ON AVERAGE [ON A SCALE FROM 1 (NONE) TO 5 (VERY SEVERE)]?: 4
IN GENERAL, WOULD YOU SAY YOUR HEALTH IS...[ON A SCALE OF 1 (POOR) TO 5 (EXCELLENT)]: 4
HOW IS THE PROMIS V1.1 BEING ADMINISTERED?: 2
IN GENERAL, HOW WOULD YOU RATE YOUR SATISFACTION WITH YOUR SOCIAL ACTIVITIES AND RELATIONSHIPS [ON A SCALE OF 1 (POOR) TO 5 (EXCELLENT)]?: 5
IN GENERAL, HOW WOULD YOU RATE YOUR MENTAL HEALTH, INCLUDING YOUR MOOD AND YOUR ABILITY TO THINK [ON A SCALE OF 1 (POOR) TO 5 (EXCELLENT)]?: 5
IN GENERAL, WOULD YOU SAY YOUR QUALITY OF LIFE IS...[ON A SCALE OF 1 (POOR) TO 5 (EXCELLENT)]: 4
IN GENERAL, PLEASE RATE HOW WELL YOU CARRY OUT YOUR USUAL SOCIAL ACTIVITIES (INCLUDES ACTIVITIES AT HOME, AT WORK, AND IN YOUR COMMUNITY, AND RESPONSIBILITIES AS A PARENT, CHILD, SPOUSE, EMPLOYEE, FRIEND, ETC) [ON A SCALE OF 1 (POOR) TO 5 (EXCELLENT)]?: 4

## 2018-09-15 ASSESSMENT — KOOS JR
RISING FROM SITTING: 2
HOW SEVERE IS YOUR KNEE STIFFNESS AFTER FIRST WAKING IN MORNING: 1
GOING UP OR DOWN STAIRS: 2
BENDING TO THE FLOOR TO PICK UP OBJECT: 1
STRAIGHTENING KNEE FULLY: 0
TWISING OR PIVOTING ON KNEE: 2
STANDING UPRIGHT: 1

## 2018-10-17 DIAGNOSIS — I10 ESSENTIAL HYPERTENSION: ICD-10-CM

## 2018-10-17 DIAGNOSIS — E78.5 HYPERLIPIDEMIA WITH TARGET LOW DENSITY LIPOPROTEIN (LDL) CHOLESTEROL LESS THAN 130 MG/DL: ICD-10-CM

## 2018-10-17 RX ORDER — ATORVASTATIN CALCIUM 40 MG/1
40 TABLET, FILM COATED ORAL DAILY
Qty: 14 TABLET | Refills: 0 | Status: SHIPPED | OUTPATIENT
Start: 2018-10-17 | End: 2018-12-28 | Stop reason: SDUPTHER

## 2018-10-17 NOTE — TELEPHONE ENCOUNTER
Patient requesting 2 week supply until her order from [facility] arrives.      Last appt: 7/10/2018  Next appt: Visit date not found

## 2018-12-12 ENCOUNTER — OFFICE VISIT (OUTPATIENT)
Dept: CARDIOLOGY | Age: 73
End: 2018-12-12
Payer: MEDICARE

## 2018-12-12 VITALS
WEIGHT: 150 LBS | BODY MASS INDEX: 24.99 KG/M2 | HEIGHT: 65 IN | HEART RATE: 65 BPM | SYSTOLIC BLOOD PRESSURE: 136 MMHG | DIASTOLIC BLOOD PRESSURE: 80 MMHG

## 2018-12-12 DIAGNOSIS — I49.3 PVC'S (PREMATURE VENTRICULAR CONTRACTIONS): Primary | ICD-10-CM

## 2018-12-12 DIAGNOSIS — I10 ESSENTIAL HYPERTENSION: ICD-10-CM

## 2018-12-12 PROCEDURE — 99213 OFFICE O/P EST LOW 20 MIN: CPT | Performed by: INTERNAL MEDICINE

## 2018-12-12 PROCEDURE — 93000 ELECTROCARDIOGRAM COMPLETE: CPT | Performed by: INTERNAL MEDICINE

## 2018-12-12 NOTE — PROGRESS NOTES
Use  Never Used    Tobacco Comment  quit 35-40 years ago          Alcohol History     Alcohol Use Status  No          Drug Use     Drug Use Status  No          Sexual Activity     Sexually Active  Not Asked                REVIEW OF SYSTEMS:    · Constitutional: there has been no unanticipated weight loss. There's been No change in energy level, No change in activity level. · Eyes: No visual changes or diplopia. No scleral icterus. · ENT: No Headaches, hearing loss or vertigo. No mouth sores or sore throat. · Cardiovascular: No chest pain, no dyspnea, no chf like symptoms  · Respiratory: No previous pulmonary problems  · Gastrointestinal: No abdominal pain, appetite loss, blood in stools. No change in bowel or bladder habits. · Genitourinary: No dysuria, trouble voiding, or hematuria. · Musculoskeletal:  See hpi  · Integumentary: No rash or pruritis. · Neurological: No headache, diplopia, change in muscle strength, numbness or tingling. No change in gait, balance, coordination, mood, affect, memory, mentation, behavior. · Psychiatric: No new anxiety or depression. · Endocrine: No temperature intolerance. No excessive thirst, fluid intake, or urination. No tremor. · Hematologic/Lymphatic: No abnormal bruising or bleeding, blood clots or swollen lymph nodes. · Allergic/Immunologic: No nasal congestion or hives. Medications:  Current Outpatient Prescriptions   Medication Sig Dispense Refill    metoprolol tartrate (LOPRESSOR) 25 MG tablet TAKE 1 TABLET TWICE A  tablet 3    atorvastatin (LIPITOR) 40 MG tablet Take 1 tablet by mouth daily 14 tablet 0    amLODIPine (NORVASC) 10 MG tablet TAKE 1 TABLET DAILY 90 tablet 3    levothyroxine (SYNTHROID) 50 MCG tablet TAKE 1 TABLET DAILY 90 tablet 3    clobetasol (TEMOVATE) 0.05 % cream Apply topically to affected area 2 times daily for up to 10 days.  1 Tube 1    oxybutynin (DITROPAN-XL) 10 MG extended release tablet TAKE 1 TABLET DAILY 90 tablet 3    Multiple Vitamins-Minerals (MULTIVITAMIN PO) Take 2 tablets by mouth every morning        No current facility-administered medications for this visit. Physical Exam:   Vitals: /80   Pulse 65   Ht 5' 5\" (1.651 m)   Wt 150 lb (68 kg)   LMP 08/15/1983 (Exact Date)   BMI 24.96 kg/m²   General appearance: alert and cooperative with exam  HEENT: Head: Normocephalic, no lesions, without obvious abnormality.   Neck: no carotid bruit, no JVD  Lungs: clear to auscultation bilaterally  Heart: irreg rate and rhythm, S1, S2 normal, no murmur, click, rub or gallop  Abdomen: soft, non-tender; bowel sounds normal; no masses,  no organomegaly  Extremities: extremities normal, atraumatic, no cyanosis or edema  Neurologic: Mental status: Alert, oriented, thought content appropriate    Labs:  Lab Results   Component Value Date    CHOL 173 05/08/2018    CHOL 239 (H) 12/16/2016    CHOL 232 (H) 06/29/2016     Lab Results   Component Value Date    TRIG 78 05/08/2018    TRIG 74 12/16/2016    TRIG 65 06/29/2016     Lab Results   Component Value Date    HDL 75 05/08/2018    HDL 94 12/16/2016     06/29/2016     Lab Results   Component Value Date    LDLCHOLESTEROL 82 05/08/2018    LDLCHOLESTEROL 130 12/16/2016    LDLCHOLESTEROL 118 06/29/2016     Lab Results   Component Value Date    VLDL NOT REPORTED 05/08/2018    VLDL 15 12/16/2016    VLDL 13 06/29/2016     Lab Results   Component Value Date    CHOLHDLRATIO 2.3 05/08/2018    CHOLHDLRATIO 2.5 12/16/2016    CHOLHDLRATIO 2.3 06/29/2016       Lab Results   Component Value Date     05/08/2018    K 4.1 05/08/2018     05/08/2018    CO2 31 05/08/2018    BUN 22 05/08/2018    CREATININE 0.83 05/08/2018    GLUCOSE 95 05/08/2018    CALCIUM 9.8 05/08/2018    PROT 7.5 05/08/2018    LABALBU 4.4 05/08/2018    BILITOT 0.45 05/08/2018    ALKPHOS 110 (H) 05/08/2018    AST 24 05/08/2018    ALT 29 05/08/2018    LABGLOM >60 05/08/2018    GFRAA >60 05/08/2018    GLOB 3.1

## 2018-12-28 DIAGNOSIS — I10 ESSENTIAL HYPERTENSION: ICD-10-CM

## 2018-12-28 DIAGNOSIS — E78.5 HYPERLIPIDEMIA WITH TARGET LOW DENSITY LIPOPROTEIN (LDL) CHOLESTEROL LESS THAN 130 MG/DL: ICD-10-CM

## 2018-12-28 RX ORDER — ATORVASTATIN CALCIUM 40 MG/1
TABLET, FILM COATED ORAL
Qty: 90 TABLET | Refills: 3 | Status: SHIPPED | OUTPATIENT
Start: 2018-12-28 | End: 2019-12-27

## 2019-01-14 ENCOUNTER — OFFICE VISIT (OUTPATIENT)
Dept: ORTHOPEDIC SURGERY | Age: 74
End: 2019-01-14
Payer: MEDICARE

## 2019-01-14 VITALS
WEIGHT: 150 LBS | SYSTOLIC BLOOD PRESSURE: 122 MMHG | RESPIRATION RATE: 20 BRPM | BODY MASS INDEX: 24.99 KG/M2 | DIASTOLIC BLOOD PRESSURE: 78 MMHG | HEIGHT: 65 IN | HEART RATE: 77 BPM

## 2019-01-14 DIAGNOSIS — M25.561 PAIN IN BOTH KNEES, UNSPECIFIED CHRONICITY: ICD-10-CM

## 2019-01-14 DIAGNOSIS — M25.561 RIGHT KNEE PAIN, UNSPECIFIED CHRONICITY: ICD-10-CM

## 2019-01-14 DIAGNOSIS — M17.12 PRIMARY OSTEOARTHRITIS OF LEFT KNEE: Primary | ICD-10-CM

## 2019-01-14 DIAGNOSIS — M25.562 PAIN IN BOTH KNEES, UNSPECIFIED CHRONICITY: ICD-10-CM

## 2019-01-14 DIAGNOSIS — Z96.651 S/P TKR (TOTAL KNEE REPLACEMENT) USING CEMENT, RIGHT: ICD-10-CM

## 2019-01-14 PROCEDURE — 99214 OFFICE O/P EST MOD 30 MIN: CPT | Performed by: ORTHOPAEDIC SURGERY

## 2019-01-14 ASSESSMENT — ENCOUNTER SYMPTOMS
APNEA: 0
COLOR CHANGE: 0
CHEST TIGHTNESS: 0
ABDOMINAL DISTENTION: 0
DIARRHEA: 0
SHORTNESS OF BREATH: 0
NAUSEA: 0
VOMITING: 0
CONSTIPATION: 0
ABDOMINAL PAIN: 0
COUGH: 0

## 2019-01-15 ENCOUNTER — TELEPHONE (OUTPATIENT)
Dept: INTERNAL MEDICINE | Age: 74
End: 2019-01-15

## 2019-01-15 RX ORDER — ALENDRONATE SODIUM 70 MG/1
70 TABLET ORAL
Qty: 4 TABLET | Refills: 3 | Status: SHIPPED | OUTPATIENT
Start: 2019-01-15 | End: 2019-04-17

## 2019-02-03 DIAGNOSIS — N32.89 SPASTIC BLADDER: ICD-10-CM

## 2019-02-04 RX ORDER — OXYBUTYNIN CHLORIDE 10 MG/1
TABLET, EXTENDED RELEASE ORAL
Qty: 90 TABLET | Refills: 3 | Status: SHIPPED | OUTPATIENT
Start: 2019-02-04 | End: 2020-01-30

## 2019-02-13 ENCOUNTER — TELEPHONE (OUTPATIENT)
Dept: ORTHOPEDIC SURGERY | Age: 74
End: 2019-02-13

## 2019-03-06 ENCOUNTER — OFFICE VISIT (OUTPATIENT)
Dept: ORTHOPEDIC SURGERY | Age: 74
End: 2019-03-06
Payer: MEDICARE

## 2019-03-06 VITALS
WEIGHT: 154 LBS | BODY MASS INDEX: 24.75 KG/M2 | DIASTOLIC BLOOD PRESSURE: 80 MMHG | HEIGHT: 66 IN | SYSTOLIC BLOOD PRESSURE: 136 MMHG | HEART RATE: 65 BPM

## 2019-03-06 DIAGNOSIS — M17.12 PRIMARY OSTEOARTHRITIS OF LEFT KNEE: Primary | ICD-10-CM

## 2019-03-06 DIAGNOSIS — M65.4 DE QUERVAIN'S TENOSYNOVITIS, LEFT: ICD-10-CM

## 2019-03-06 DIAGNOSIS — S93.609A SPRAIN OF FOOT, UNSPECIFIED LATERALITY, INITIAL ENCOUNTER: Primary | ICD-10-CM

## 2019-03-06 PROCEDURE — 20610 DRAIN/INJ JOINT/BURSA W/O US: CPT | Performed by: PHYSICIAN ASSISTANT

## 2019-03-06 PROCEDURE — 99212 OFFICE O/P EST SF 10 MIN: CPT | Performed by: PHYSICIAN ASSISTANT

## 2019-03-06 ASSESSMENT — ENCOUNTER SYMPTOMS
DIARRHEA: 0
SHORTNESS OF BREATH: 0
ABDOMINAL DISTENTION: 0
CONSTIPATION: 0
RESPIRATORY NEGATIVE: 1
NAUSEA: 0
CHEST TIGHTNESS: 0
COLOR CHANGE: 0
APNEA: 0
VOMITING: 0
COUGH: 0
ABDOMINAL PAIN: 0

## 2019-03-14 ENCOUNTER — OFFICE VISIT (OUTPATIENT)
Dept: ORTHOPEDIC SURGERY | Age: 74
End: 2019-03-14
Payer: MEDICARE

## 2019-03-14 VITALS
HEART RATE: 61 BPM | WEIGHT: 150 LBS | HEIGHT: 66 IN | SYSTOLIC BLOOD PRESSURE: 136 MMHG | BODY MASS INDEX: 24.11 KG/M2 | DIASTOLIC BLOOD PRESSURE: 75 MMHG

## 2019-03-14 DIAGNOSIS — M17.12 PRIMARY OSTEOARTHRITIS OF LEFT KNEE: Primary | ICD-10-CM

## 2019-03-14 PROCEDURE — 99024 POSTOP FOLLOW-UP VISIT: CPT | Performed by: PHYSICIAN ASSISTANT

## 2019-03-14 PROCEDURE — 20611 DRAIN/INJ JOINT/BURSA W/US: CPT | Performed by: PHYSICIAN ASSISTANT

## 2019-03-14 RX ORDER — LIDOCAINE HYDROCHLORIDE 10 MG/ML
4 INJECTION, SOLUTION INFILTRATION; PERINEURAL ONCE
Status: SHIPPED | OUTPATIENT
Start: 2019-03-14

## 2019-03-14 ASSESSMENT — ENCOUNTER SYMPTOMS
NAUSEA: 0
CONSTIPATION: 0
SHORTNESS OF BREATH: 0
CHEST TIGHTNESS: 0
RESPIRATORY NEGATIVE: 1
APNEA: 0
DIARRHEA: 0
COUGH: 0
VOMITING: 0
ABDOMINAL DISTENTION: 0
ABDOMINAL PAIN: 0
COLOR CHANGE: 0

## 2019-04-17 ENCOUNTER — OFFICE VISIT (OUTPATIENT)
Dept: PRIMARY CARE CLINIC | Age: 74
End: 2019-04-17
Payer: MEDICARE

## 2019-04-17 VITALS
BODY MASS INDEX: 23.63 KG/M2 | RESPIRATION RATE: 16 BRPM | WEIGHT: 147 LBS | HEART RATE: 76 BPM | HEIGHT: 66 IN | DIASTOLIC BLOOD PRESSURE: 60 MMHG | SYSTOLIC BLOOD PRESSURE: 110 MMHG | TEMPERATURE: 97.8 F | OXYGEN SATURATION: 97 %

## 2019-04-17 DIAGNOSIS — J20.9 ACUTE BRONCHITIS, UNSPECIFIED ORGANISM: Primary | ICD-10-CM

## 2019-04-17 DIAGNOSIS — J47.0 BRONCHIECTASIS WITH ACUTE LOWER RESPIRATORY INFECTION (HCC): ICD-10-CM

## 2019-04-17 PROCEDURE — 99214 OFFICE O/P EST MOD 30 MIN: CPT | Performed by: FAMILY MEDICINE

## 2019-04-17 RX ORDER — CEFUROXIME AXETIL 500 MG/1
500 TABLET ORAL 2 TIMES DAILY
Qty: 20 TABLET | Refills: 0 | Status: SHIPPED | OUTPATIENT
Start: 2019-04-17 | End: 2019-07-11 | Stop reason: ALTCHOICE

## 2019-04-17 ASSESSMENT — ENCOUNTER SYMPTOMS
COUGH: 1
CONSTIPATION: 0
EYE REDNESS: 0
ABDOMINAL PAIN: 0
EYE DISCHARGE: 0
WHEEZING: 0
SINUS PAIN: 0
NAUSEA: 0
SORE THROAT: 1
DIARRHEA: 0
VOMITING: 0
RHINORRHEA: 0
SINUS PRESSURE: 0
SHORTNESS OF BREATH: 0
TROUBLE SWALLOWING: 0

## 2019-04-17 ASSESSMENT — PATIENT HEALTH QUESTIONNAIRE - PHQ9
SUM OF ALL RESPONSES TO PHQ QUESTIONS 1-9: 0
1. LITTLE INTEREST OR PLEASURE IN DOING THINGS: 0
2. FEELING DOWN, DEPRESSED OR HOPELESS: 0
SUM OF ALL RESPONSES TO PHQ QUESTIONS 1-9: 0
SUM OF ALL RESPONSES TO PHQ9 QUESTIONS 1 & 2: 0

## 2019-04-17 NOTE — PROGRESS NOTES
2019     Laura Olvera (:  1945) is a 66 y.o. female, here for evaluation of the following medical concerns:    Cough   This is a new problem. The current episode started in the past 7 days. The problem has been gradually worsening. The problem occurs constantly. The cough is non-productive. Associated symptoms include ear congestion, ear pain (feel hot), a fever (101.1), myalgias, nasal congestion and a sore throat (irritated in oropharynx from coughing). Pertinent negatives include no chest pain, chills, eye redness, headaches, postnasal drip, rash, rhinorrhea, shortness of breath or wheezing. Treatments tried: Mucinex, tylenol severe cold and flu. The treatment provided mild relief. There is no history of environmental allergies. Has known bronchiectasis and  is I the hospital with pneumonia. Did review patient's med list, allergies, social history,pmhx and pshx today as noted in the record. Review of Systems   Constitutional: Positive for fever (101.1). Negative for chills and fatigue. HENT: Positive for congestion, ear pain (feel hot) and sore throat (irritated in oropharynx from coughing). Negative for postnasal drip, rhinorrhea, sinus pressure, sinus pain and trouble swallowing. Eyes: Negative for discharge and redness. Respiratory: Positive for cough. Negative for shortness of breath and wheezing. Cardiovascular: Negative for chest pain. Gastrointestinal: Negative for abdominal pain, constipation, diarrhea, nausea and vomiting. Musculoskeletal: Positive for myalgias. Negative for arthralgias and neck pain. Skin: Negative for rash and wound. Allergic/Immunologic: Negative for environmental allergies. Neurological: Negative for dizziness, weakness, light-headedness and headaches. Hematological: Negative for adenopathy. Psychiatric/Behavioral: Negative. Prior to Visit Medications    Medication Sig Taking?  Authorizing Provider Methylsulfonylmethane (MSM PO) Take by mouth daily Yes Historical Provider, MD   Nutritional Supplements (SILICA) 72.3 MG CAPS Take by mouth daily Yes Historical Provider, MD   Calcium Carb-Cholecalciferol (CALCIUM 600+D3 PO) Take by mouth 2 times daily Yes Historical Provider, MD   vitamin D (CHOLECALCIFEROL) 1000 UNIT TABS tablet Take 2,000 Units by mouth daily  Yes Historical Provider, MD   oxybutynin (DITROPAN-XL) 10 MG extended release tablet TAKE 1 TABLET DAILY Yes Monty Manuel DO   atorvastatin (LIPITOR) 40 MG tablet TAKE 1 TABLET DAILY Yes Monty Manuel DO   metoprolol tartrate (LOPRESSOR) 25 MG tablet TAKE 1 TABLET TWICE A DAY Yes Celso Chavez MD   amLODIPine (NORVASC) 10 MG tablet TAKE 1 TABLET DAILY Yes Chacha Young MD   levothyroxine (SYNTHROID) 50 MCG tablet TAKE 1 TABLET DAILY Yes Chacha Young MD   Multiple Vitamins-Minerals (MULTIVITAMIN PO) Take 2 tablets by mouth every morning  Yes Historical Provider, MD        Social History     Tobacco Use    Smoking status: Former Smoker     Packs/day: 0.50     Years: 6.00     Pack years: 3.00     Types: Cigarettes     Last attempt to quit: 1960     Years since quittin.9    Smokeless tobacco: Never Used    Tobacco comment: quit 35-40 years ago   Substance Use Topics    Alcohol use: No     Alcohol/week: 0.0 oz        Vitals:    19 1118   BP: 110/60   Site: Left Upper Arm   Position: Sitting   Cuff Size: Medium Adult   Pulse: 76   Resp: 16   Temp: 97.8 °F (36.6 °C)   TempSrc: Tympanic   SpO2: 97%   Weight: 147 lb (66.7 kg)   Height: 5' 5.5\" (1.664 m)     Estimated body mass index is 24.09 kg/m² as calculated from the following:    Height as of this encounter: 5' 5.5\" (1.664 m). Weight as of this encounter: 147 lb (66.7 kg). Physical Exam   Constitutional: She is oriented to person, place, and time. She appears well-developed and well-nourished. No distress. HENT:   Head: Normocephalic and atraumatic.

## 2019-04-25 ENCOUNTER — OFFICE VISIT (OUTPATIENT)
Dept: ORTHOPEDIC SURGERY | Age: 74
End: 2019-04-25
Payer: MEDICARE

## 2019-04-25 VITALS
BODY MASS INDEX: 23.57 KG/M2 | HEART RATE: 86 BPM | DIASTOLIC BLOOD PRESSURE: 88 MMHG | SYSTOLIC BLOOD PRESSURE: 160 MMHG | HEIGHT: 65 IN | WEIGHT: 141.5 LBS

## 2019-04-25 DIAGNOSIS — Z96.651 S/P TKR (TOTAL KNEE REPLACEMENT) USING CEMENT, RIGHT: ICD-10-CM

## 2019-04-25 DIAGNOSIS — S80.01XS CONTUSION OF RIGHT KNEE, SEQUELA: Primary | ICD-10-CM

## 2019-04-25 PROCEDURE — 99213 OFFICE O/P EST LOW 20 MIN: CPT | Performed by: ORTHOPAEDIC SURGERY

## 2019-04-25 ASSESSMENT — ENCOUNTER SYMPTOMS
NAUSEA: 0
COLOR CHANGE: 0
COUGH: 0
ABDOMINAL PAIN: 0
DIARRHEA: 0
VOMITING: 0
CHEST TIGHTNESS: 0
ABDOMINAL DISTENTION: 0
CONSTIPATION: 0
APNEA: 0
SHORTNESS OF BREATH: 0

## 2019-04-25 NOTE — PROGRESS NOTES
[facility]  [facility] [place] [address]  Dept: [phone]  Dept Fax: [phone]        Post Operative Follow Up    Subjective:     Chief Complaint   Patient presents with    Knee Pain     pateint states that she had a knee replacement 12/2017 right knee and states that dog ran into the right knee     Post Op Surgery:     The patient is here for a follow up after having a Right TKA. The date of surgery was on 12/19/17. Therefore we are 1 year and 4 months post op. Currently the patient's pain is controlled with Tylenol ES as needed. Physical therapy was completed. Patient presents today with no ambulatory devices. Patient is here today because a dog ran into her knee with a lot of force and she wants to make sure that nothing went wrong with the prosthetic. Review of Systems   Constitutional: Positive for activity change. Negative for appetite change, fatigue and fever. Respiratory: Negative for apnea, cough, chest tightness and shortness of breath. Cardiovascular: Negative for chest pain, palpitations and leg swelling. Gastrointestinal: Negative for abdominal distention, abdominal pain, constipation, diarrhea, nausea and vomiting. Genitourinary: Negative for difficulty urinating, dysuria and hematuria. Musculoskeletal: Positive for arthralgias. Negative for gait problem, joint swelling and myalgias. Skin: Negative for color change and rash. Neurological: Negative for dizziness, weakness, numbness and headaches. Psychiatric/Behavioral: Negative for sleep disturbance. I have reviewed the CC, HPI, ROS, PMH, FHX, Social History, and if not present in this note, I have reviewed in the patient's chart. I agree with the documentation provided by other staff and have reviewed their documentation prior to providing my signature indicating agreement.     Vitals:   BP (!) 160/88   Pulse 86   Ht 5' 5\" (1.651 m) Wt 141 lb 8 oz (64.2 kg)   LMP 08/15/1983 (Exact Date)   BMI 23.55 kg/m²  Body mass index is 23.55 kg/m². Physical Examination:     Orthopedics:    GENERAL: Alert and oriented X3 in no acute distress. SKIN: Intact without lesions or ulcerations. NEURO: Musculoskeletal and axillary nerves intact to sensory and motor testing. VASC: Capillary refill is less than 3 seconds. Post Op Exam:    LOCATION: Right Knee  SITE: Distal neurocirculatory status is intact. EXAM: Sensation is intact to light touch, there is full motor function of the extremity. PALP: Prominence over patella that is sensitive to touch, moderate effusion that has been present since surgery. Thickening of the bursa sac. ROM: 0/130 degrees  LIGAMENT: There is No varus instability at 0 degrees and 1+ varus instability at 30 degrees. There is No valgus instability at 0 degrees and 1+ valgus instability at 30 degrees. Procedures:     Procedure:  No    Radiology:   See separate report. Assessment:     1. Contusion of right knee, sequela    2. S/P TKR (total knee replacement) using cement, right      Plan:   Post Op Treatment : Patient had the treatment regimen reviewed today 1 year and 4 months s/p Right TKA. I reviewed the films with the patient. We discussed some of the etiologies and natural histories of Right TKA. We also discussed the various treatment alternatives including anti-inflammatory medications, physical therapy, injections, further imaging studies and as a last resort surgery. During today's visit, I explained to the patient that she has thickening of the bursa sac and she needs to massage the area with the knee to break down that thickening of the bursa. I then told the patient that she can use an ice massage with ice frozen in a jonas cup to try and soften up the area so she may have good pain relief. Overall, I do not feel there has been any damage to the prosthetic.  Patient has opted to continue doing her exercises at home that she was shown at physical therapy so she may keep the knee strong. Patient should return to the office in 6 weeks to f/u with  Deanna Maxwell PA-C, ATC for a cortisone injection into the left knee if needed. The patient will call the office immediately with any problems that may arise. No orders of the defined types were placed in this encounter. Orders Placed This Encounter   Procedures    XR KNEE RIGHT (MIN 4 VIEWS)     Standing Status:   Future     Number of Occurrences:   1     Standing Expiration Date:   4/25/2020     Order Specific Question:   Reason for exam:     Answer:   pain     I, Edward Day V, am scribing for and in the presence of Luz Haider D.O.  4/28/2019  4:25 PM      ILuz DO, have personally seen this patient, reviewed the chart including history, and imaging if done. I personally  performed the physical exam and obtained any needed additional history. I placed orders, performed or supervised procedures and developed the treatment plan.     Electronically signed by Danielle Concepcion DO, on 4/28/2019 at 4:25 PM

## 2019-06-03 DIAGNOSIS — E03.9 ACQUIRED HYPOTHYROIDISM: ICD-10-CM

## 2019-06-03 RX ORDER — LEVOTHYROXINE SODIUM 0.05 MG/1
TABLET ORAL
Qty: 90 TABLET | Refills: 3 | Status: SHIPPED | OUTPATIENT
Start: 2019-06-03 | End: 2020-05-28

## 2019-06-10 RX ORDER — AMLODIPINE BESYLATE 10 MG/1
TABLET ORAL
Qty: 90 TABLET | Refills: 3 | Status: SHIPPED | OUTPATIENT
Start: 2019-06-10 | End: 2020-06-04

## 2019-06-13 ENCOUNTER — TELEPHONE (OUTPATIENT)
Dept: INTERNAL MEDICINE | Age: 74
End: 2019-06-13

## 2019-07-11 ENCOUNTER — OFFICE VISIT (OUTPATIENT)
Dept: INTERNAL MEDICINE | Age: 74
End: 2019-07-11
Payer: MEDICARE

## 2019-07-11 VITALS
DIASTOLIC BLOOD PRESSURE: 60 MMHG | WEIGHT: 145 LBS | SYSTOLIC BLOOD PRESSURE: 138 MMHG | HEIGHT: 65 IN | HEART RATE: 72 BPM | BODY MASS INDEX: 24.16 KG/M2 | RESPIRATION RATE: 18 BRPM

## 2019-07-11 DIAGNOSIS — M54.42 CHRONIC BILATERAL LOW BACK PAIN WITH LEFT-SIDED SCIATICA: ICD-10-CM

## 2019-07-11 DIAGNOSIS — Z12.39 ENCOUNTER FOR SCREENING FOR MALIGNANT NEOPLASM OF BREAST: ICD-10-CM

## 2019-07-11 DIAGNOSIS — Z00.00 MEDICARE ANNUAL WELLNESS VISIT, SUBSEQUENT: ICD-10-CM

## 2019-07-11 DIAGNOSIS — N32.81 OAB (OVERACTIVE BLADDER): ICD-10-CM

## 2019-07-11 DIAGNOSIS — I10 ESSENTIAL HYPERTENSION: ICD-10-CM

## 2019-07-11 DIAGNOSIS — E03.9 HYPOTHYROIDISM, UNSPECIFIED TYPE: ICD-10-CM

## 2019-07-11 DIAGNOSIS — Z00.00 ROUTINE GENERAL MEDICAL EXAMINATION AT A HEALTH CARE FACILITY: Primary | ICD-10-CM

## 2019-07-11 DIAGNOSIS — G89.29 CHRONIC BILATERAL LOW BACK PAIN WITH LEFT-SIDED SCIATICA: ICD-10-CM

## 2019-07-11 DIAGNOSIS — E78.5 HYPERLIPIDEMIA, UNSPECIFIED HYPERLIPIDEMIA TYPE: ICD-10-CM

## 2019-07-11 PROCEDURE — G0439 PPPS, SUBSEQ VISIT: HCPCS | Performed by: INTERNAL MEDICINE

## 2019-07-11 PROCEDURE — 99214 OFFICE O/P EST MOD 30 MIN: CPT | Performed by: INTERNAL MEDICINE

## 2019-07-11 ASSESSMENT — ENCOUNTER SYMPTOMS
BACK PAIN: 1
ABDOMINAL PAIN: 0
SHORTNESS OF BREATH: 0
BLOOD IN STOOL: 0
NAUSEA: 0
VOMITING: 0
EYE PAIN: 0
CONSTIPATION: 0
DIARRHEA: 0
COUGH: 0

## 2019-07-11 ASSESSMENT — LIFESTYLE VARIABLES: HOW OFTEN DO YOU HAVE A DRINK CONTAINING ALCOHOL: 0

## 2019-07-11 ASSESSMENT — PATIENT HEALTH QUESTIONNAIRE - PHQ9
SUM OF ALL RESPONSES TO PHQ QUESTIONS 1-9: 0
SUM OF ALL RESPONSES TO PHQ QUESTIONS 1-9: 0

## 2019-07-11 ASSESSMENT — ANXIETY QUESTIONNAIRES: GAD7 TOTAL SCORE: 0

## 2019-07-11 NOTE — PATIENT INSTRUCTIONS
Personalized Preventive Plan for Teresa Stratton - 7/11/2019  Medicare offers a range of preventive health benefits. Some of the tests and screenings are paid in full while other may be subject to a deductible, co-insurance, and/or copay. Some of these benefits include a comprehensive review of your medical history including lifestyle, illnesses that may run in your family, and various assessments and screenings as appropriate. After reviewing your medical record and screening and assessments performed today your provider may have ordered immunizations, labs, imaging, and/or referrals for you. A list of these orders (if applicable) as well as your Preventive Care list are included within your After Visit Summary for your review. Other Preventive Recommendations:    · A preventive eye exam performed by an eye specialist is recommended every 1-2 years to screen for glaucoma; cataracts, macular degeneration, and other eye disorders. · A preventive dental visit is recommended every 6 months. · Try to get at least 150 minutes of exercise per week or 10,000 steps per day on a pedometer . · Order or download the FREE \"Exercise & Physical Activity: Your Everyday Guide\" from The [employer]. Call 2-320.986.8428 or search The [employer] online. · You need 3251-4384 mg of calcium and 0081-0545 IU of vitamin D per day. It is possible to meet your calcium requirement with diet alone, but a vitamin D supplement is usually necessary to meet this goal.  · When exposed to the sun, use a sunscreen that protects against both UVA and UVB radiation with an SPF of 30 or greater. Reapply every 2 to 3 hours or after sweating, drying off with a towel, or swimming. · Always wear a seat belt when traveling in a car. Always wear a helmet when riding a bicycle or motorcycle.

## 2019-07-23 ENCOUNTER — OFFICE VISIT (OUTPATIENT)
Dept: CARDIOLOGY | Age: 74
End: 2019-07-23
Payer: MEDICARE

## 2019-07-23 VITALS
HEART RATE: 74 BPM | SYSTOLIC BLOOD PRESSURE: 112 MMHG | WEIGHT: 144 LBS | BODY MASS INDEX: 23.99 KG/M2 | HEIGHT: 65 IN | DIASTOLIC BLOOD PRESSURE: 64 MMHG

## 2019-07-23 DIAGNOSIS — R60.0 LEG EDEMA: ICD-10-CM

## 2019-07-23 DIAGNOSIS — E78.5 HYPERLIPIDEMIA, UNSPECIFIED HYPERLIPIDEMIA TYPE: ICD-10-CM

## 2019-07-23 DIAGNOSIS — I10 ESSENTIAL HYPERTENSION: Primary | ICD-10-CM

## 2019-07-23 DIAGNOSIS — I49.3 PVC (PREMATURE VENTRICULAR CONTRACTION): ICD-10-CM

## 2019-07-23 PROCEDURE — 93000 ELECTROCARDIOGRAM COMPLETE: CPT | Performed by: INTERNAL MEDICINE

## 2019-07-23 PROCEDURE — 99214 OFFICE O/P EST MOD 30 MIN: CPT | Performed by: INTERNAL MEDICINE

## 2019-07-23 RX ORDER — FUROSEMIDE 20 MG/1
20 TABLET ORAL DAILY PRN
Qty: 30 TABLET | Refills: 5 | Status: SHIPPED | OUTPATIENT
Start: 2019-07-23 | End: 2020-07-14 | Stop reason: SDUPTHER

## 2019-07-23 NOTE — PROGRESS NOTES
TABLET DAILY 12/28/18  Yes Monty Manuel DO   metoprolol tartrate (LOPRESSOR) 25 MG tablet TAKE 1 TABLET TWICE A DAY 11/26/18  Yes Elder MD Chantel   Multiple Vitamins-Minerals (MULTIVITAMIN PO) Take 2 tablets by mouth every morning    Yes Historical Provider, MD       Allergies:  Levaquin [levofloxacin in d5w]; Iodine; and Penicillins    Social History:   reports that she quit smoking about 59 years ago. Her smoking use included cigarettes. She has a 3.00 pack-year smoking history. She has never used smokeless tobacco. She reports that she does not drink alcohol or use drugs. Family History: family history includes No Known Problems in her father; Other in her mother. No h/o sudden cardiac death. No for premature CAD    REVIEW OF SYSTEMS:    · Constitutional: there has been no unanticipated weight loss. There's been No change in energy level, No change in activity level. · Eyes: No visual changes or diplopia. No scleral icterus. · ENT: No Headaches, hearing loss or vertigo. No mouth sores or sore throat. · Cardiovascular: see above  · Respiratory: see above  · Gastrointestinal: No abdominal pain, appetite loss, blood in stools. · Genitourinary: No dysuria, trouble voiding, or hematuria. · Musculoskeletal:  No gait disturbance, No weakness or joint complaints. · Integumentary: No rash or pruritis. · Neurological: No headache or diplopia. No tingling  · Psychiatric: No anxiety, or depression. · Endocrine: No temperature intolerance. · Hematologic/Lymphatic: No abnormal bruising or bleeding, blood clots or swollen lymph nodes. · Allergic/Immunologic: No nasal congestion or hives. PHYSICAL EXAM:      Ht 5' 5\" (1.651 m)   Wt 145 lb (65.8 kg)   LMP 08/15/1983 (Exact Date)   BMI 24.13 kg/m²    Vitals:    07/23/19 1037   BP: 112/64   Pulse: 74       Constitutional and General Appearance: alert, cooperative, no distress and appears stated age  HEENT: PERRL, no cervical lymphadenopathy.

## 2019-07-24 ENCOUNTER — HOSPITAL ENCOUNTER (OUTPATIENT)
Dept: MAMMOGRAPHY | Age: 74
Discharge: HOME | End: 2019-07-26
Payer: MEDICARE

## 2019-07-24 DIAGNOSIS — Z12.39 ENCOUNTER FOR SCREENING FOR MALIGNANT NEOPLASM OF BREAST: ICD-10-CM

## 2019-07-24 PROCEDURE — 77063 BREAST TOMOSYNTHESIS BI: CPT

## 2019-07-26 ENCOUNTER — HOSPITAL ENCOUNTER (OUTPATIENT)
Dept: NON INVASIVE DIAGNOSTICS | Age: 74
Discharge: HOME | End: 2019-07-26
Payer: MEDICARE

## 2019-07-26 ENCOUNTER — HOSPITAL ENCOUNTER (OUTPATIENT)
Dept: LAB | Age: 74
Discharge: HOME | End: 2019-07-26
Payer: MEDICARE

## 2019-07-26 DIAGNOSIS — R60.0 LEG EDEMA: ICD-10-CM

## 2019-07-26 DIAGNOSIS — I10 ESSENTIAL HYPERTENSION: ICD-10-CM

## 2019-07-26 DIAGNOSIS — E78.5 HYPERLIPIDEMIA, UNSPECIFIED HYPERLIPIDEMIA TYPE: ICD-10-CM

## 2019-07-26 DIAGNOSIS — E03.9 HYPOTHYROIDISM, UNSPECIFIED TYPE: ICD-10-CM

## 2019-07-26 LAB
ALBUMIN SERPL-MCNC: 4.6 G/DL (ref 3.5–5.2)
ALBUMIN/GLOBULIN RATIO: 1.6 (ref 1–2.5)
ALP BLD-CCNC: 95 U/L (ref 35–104)
ALT SERPL-CCNC: 41 U/L (ref 5–33)
ANION GAP SERPL CALCULATED.3IONS-SCNC: 11 MMOL/L (ref 9–17)
AST SERPL-CCNC: 28 U/L
BILIRUB SERPL-MCNC: 0.5 MG/DL (ref 0.3–1.2)
BUN BLDV-MCNC: 31 MG/DL (ref 8–23)
BUN/CREAT BLD: 32 (ref 9–20)
CALCIUM SERPL-MCNC: 10.3 MG/DL (ref 8.6–10.4)
CHLORIDE BLD-SCNC: 103 MMOL/L (ref 98–107)
CHOLESTEROL/HDL RATIO: 2.4
CHOLESTEROL: 180 MG/DL
CO2: 28 MMOL/L (ref 20–31)
CREAT SERPL-MCNC: 0.97 MG/DL (ref 0.5–0.9)
GFR AFRICAN AMERICAN: >60 ML/MIN
GFR NON-AFRICAN AMERICAN: 56 ML/MIN
GFR SERPL CREATININE-BSD FRML MDRD: ABNORMAL ML/MIN/{1.73_M2}
GFR SERPL CREATININE-BSD FRML MDRD: ABNORMAL ML/MIN/{1.73_M2}
GLUCOSE BLD-MCNC: 97 MG/DL (ref 70–99)
HDLC SERPL-MCNC: 74 MG/DL
LDL CHOLESTEROL: 93 MG/DL (ref 0–130)
LV EF: 60 %
LVEF MODALITY: NORMAL
POTASSIUM SERPL-SCNC: 4.5 MMOL/L (ref 3.7–5.3)
SODIUM BLD-SCNC: 142 MMOL/L (ref 135–144)
THYROXINE, FREE: 1.22 NG/DL (ref 0.93–1.7)
TOTAL PROTEIN: 7.4 G/DL (ref 6.4–8.3)
TRIGL SERPL-MCNC: 67 MG/DL
TSH SERPL DL<=0.05 MIU/L-ACNC: 3.2 MIU/L (ref 0.3–5)
VLDLC SERPL CALC-MCNC: NORMAL MG/DL (ref 1–30)

## 2019-07-26 PROCEDURE — 93306 TTE W/DOPPLER COMPLETE: CPT

## 2019-07-26 PROCEDURE — 80061 LIPID PANEL: CPT

## 2019-07-26 PROCEDURE — 80053 COMPREHEN METABOLIC PANEL: CPT

## 2019-07-26 PROCEDURE — 36415 COLL VENOUS BLD VENIPUNCTURE: CPT

## 2019-07-26 PROCEDURE — 84439 ASSAY OF FREE THYROXINE: CPT

## 2019-07-26 PROCEDURE — 84443 ASSAY THYROID STIM HORMONE: CPT

## 2019-07-26 NOTE — PROGRESS NOTES
Overdue reminder letter mailed to patient.
Carb-Cholecalciferol (CALCIUM 600+D3 PO) Take by mouth 2 times daily      vitamin D (CHOLECALCIFEROL) 1000 UNIT TABS tablet Take 2,000 Units by mouth daily       oxybutynin (DITROPAN-XL) 10 MG extended release tablet TAKE 1 TABLET DAILY 90 tablet 3    atorvastatin (LIPITOR) 40 MG tablet TAKE 1 TABLET DAILY 90 tablet 3    metoprolol tartrate (LOPRESSOR) 25 MG tablet TAKE 1 TABLET TWICE A  tablet 3    Multiple Vitamins-Minerals (MULTIVITAMIN PO) Take 2 tablets by mouth every morning        Current Facility-Administered Medications   Medication Dose Route Frequency Provider Last Rate Last Dose    lidocaine 1 % injection 4 mL  4 mL Intradermal Once Dilia Medina PA-C         Allergies   Allergen Reactions    Levaquin [Levofloxacin In D5w] Other (See Comments)     Attacked muscles and tendons    Iodine Other (See Comments)     Cantu       Penicillins Other (See Comments)     abcess at site of injection       Health Maintenance   Topic Date Due    DTaP/Tdap/Td vaccine (1 - Tdap) 04/16/1962    Annual Wellness Visit (AWV)  04/16/2006    Shingles Vaccine (2 of 3) 11/01/2011    Pneumococcal 65+ years Vaccine (2 of 2 - PPSV23) 12/03/2015    TSH testing  05/08/2019    Flu vaccine (1) 09/01/2019    DEXA (modify frequency per FRAX score)  08/14/2020       Subjective:      Review of Systems   Constitutional: Negative for chills and fever. HENT: Negative for hearing loss. Eyes: Negative for pain and visual disturbance. Respiratory: Negative for cough and shortness of breath. Cardiovascular: Negative for chest pain, palpitations and leg swelling. Gastrointestinal: Negative for abdominal pain, blood in stool, constipation, diarrhea, nausea and vomiting. Endocrine: Negative for cold intolerance, polydipsia and polyuria. Genitourinary: Negative for difficulty urinating, dysuria and hematuria. Musculoskeletal: Positive for back pain. Negative for arthralgias, gait problem and neck pain.
(Ford Inman) 12/01/2007    Zoster Live (Zostavax) 09/06/2011        Health Maintenance   Topic Date Due    DTaP/Tdap/Td vaccine (1 - Tdap) 04/16/1962    Annual Wellness Visit (AWV)  04/16/2006    Shingles Vaccine (2 of 3) 11/01/2011    Pneumococcal 65+ years Vaccine (2 of 2 - PPSV23) 12/03/2015    TSH testing  05/08/2019    Flu vaccine (1) 09/01/2019    DEXA (modify frequency per FRAX score)  08/14/2020     Recommendations for Preventive Services Due: see orders and patient instructions/AVS.  .   Recommended screening schedule for the next 5-10 years is provided to the patient in written form: see Patient Instructions/AVS.

## 2019-10-04 ENCOUNTER — TELEPHONE (OUTPATIENT)
Dept: INTERNAL MEDICINE | Age: 74
End: 2019-10-04

## 2019-10-04 DIAGNOSIS — M25.552 LEFT HIP PAIN: Primary | ICD-10-CM

## 2019-10-04 RX ORDER — METHYLPREDNISOLONE 4 MG/1
TABLET ORAL
Qty: 1 KIT | Refills: 0 | Status: SHIPPED | OUTPATIENT
Start: 2019-10-04 | End: 2019-10-10

## 2019-12-09 ENCOUNTER — IMMUNIZATION (OUTPATIENT)
Dept: LAB | Age: 74
End: 2019-12-09
Payer: MEDICARE

## 2019-12-09 PROCEDURE — 90653 IIV ADJUVANT VACCINE IM: CPT | Performed by: PHYSICIAN ASSISTANT

## 2019-12-09 PROCEDURE — G0008 ADMIN INFLUENZA VIRUS VAC: HCPCS | Performed by: PHYSICIAN ASSISTANT

## 2019-12-26 DIAGNOSIS — E78.5 HYPERLIPIDEMIA WITH TARGET LOW DENSITY LIPOPROTEIN (LDL) CHOLESTEROL LESS THAN 130 MG/DL: ICD-10-CM

## 2019-12-26 DIAGNOSIS — I10 ESSENTIAL HYPERTENSION: ICD-10-CM

## 2019-12-27 RX ORDER — ATORVASTATIN CALCIUM 40 MG/1
TABLET, FILM COATED ORAL
Qty: 90 TABLET | Refills: 4 | Status: SHIPPED | OUTPATIENT
Start: 2019-12-27 | End: 2021-01-14 | Stop reason: SDUPTHER

## 2020-01-13 ENCOUNTER — OFFICE VISIT (OUTPATIENT)
Dept: INTERNAL MEDICINE | Age: 75
End: 2020-01-13
Payer: MEDICARE

## 2020-01-13 VITALS
SYSTOLIC BLOOD PRESSURE: 118 MMHG | WEIGHT: 151 LBS | HEART RATE: 56 BPM | DIASTOLIC BLOOD PRESSURE: 70 MMHG | HEIGHT: 65 IN | RESPIRATION RATE: 16 BRPM | BODY MASS INDEX: 25.16 KG/M2

## 2020-01-13 PROCEDURE — 99214 OFFICE O/P EST MOD 30 MIN: CPT | Performed by: INTERNAL MEDICINE

## 2020-01-13 RX ORDER — CLOTRIMAZOLE AND BETAMETHASONE DIPROPIONATE 10; .64 MG/G; MG/G
CREAM TOPICAL
Qty: 1 G | Refills: 3 | Status: SHIPPED | OUTPATIENT
Start: 2020-01-13 | End: 2020-07-14

## 2020-01-13 ASSESSMENT — ENCOUNTER SYMPTOMS
SHORTNESS OF BREATH: 0
NAUSEA: 0
VOMITING: 0
DIARRHEA: 0
BLOOD IN STOOL: 0
BACK PAIN: 1
COUGH: 0
ABDOMINAL PAIN: 0
EYE PAIN: 0
CONSTIPATION: 0

## 2020-01-13 ASSESSMENT — PATIENT HEALTH QUESTIONNAIRE - PHQ9
SUM OF ALL RESPONSES TO PHQ9 QUESTIONS 1 & 2: 0
SUM OF ALL RESPONSES TO PHQ QUESTIONS 1-9: 0
2. FEELING DOWN, DEPRESSED OR HOPELESS: 0
1. LITTLE INTEREST OR PLEASURE IN DOING THINGS: 0
SUM OF ALL RESPONSES TO PHQ QUESTIONS 1-9: 0

## 2020-01-13 NOTE — PROGRESS NOTES
Carb-Cholecalciferol (CALCIUM 600+D3 PO) Take by mouth 2 times daily      oxybutynin (DITROPAN-XL) 10 MG extended release tablet TAKE 1 TABLET DAILY 90 tablet 3    Multiple Vitamins-Minerals (MULTIVITAMIN PO) Take 2 tablets by mouth every morning        Current Facility-Administered Medications   Medication Dose Route Frequency Provider Last Rate Last Dose    lidocaine 1 % injection 4 mL  4 mL Intradermal Once PADMINI Duong-C         Allergies   Allergen Reactions    Levaquin [Levofloxacin In D5w] Other (See Comments)     Attacked muscles and tendons    Iodine Other (See Comments)     Cantu       Penicillins Other (See Comments)     abcess at site of injection       Health Maintenance   Topic Date Due    DTaP/Tdap/Td vaccine (1 - Tdap) 04/16/1954    Shingles Vaccine (2 of 3) 11/01/2011    Pneumococcal 65+ years Vaccine (2 of 2 - PPSV23) 12/03/2015    Annual Wellness Visit (AWV)  07/10/2020    Lipid screen  07/26/2020    TSH testing  07/26/2020    Potassium monitoring  07/26/2020    Creatinine monitoring  07/26/2020    DEXA (modify frequency per FRAX score)  08/14/2020    Flu vaccine  Completed       Subjective:      Review of Systems   Constitutional: Negative for chills and fever. HENT: Negative for hearing loss. Eyes: Negative for pain and visual disturbance. Respiratory: Negative for cough and shortness of breath. Cardiovascular: Negative for chest pain, palpitations and leg swelling. Gastrointestinal: Negative for abdominal pain, blood in stool, constipation, diarrhea, nausea and vomiting. Endocrine: Negative for cold intolerance, polydipsia and polyuria. Genitourinary: Negative for difficulty urinating, dysuria and hematuria. Musculoskeletal: Positive for back pain. Negative for arthralgias, gait problem and neck pain. Skin: Negative for pallor and rash. Neurological: Negative for dizziness, weakness, numbness and headaches.    Hematological: Negative for Plan:       Return in about 6 months (around 7/13/2020) for medicare AWV, Hypertension, Hyperlipidemia. Orders Placed This Encounter   Procedures    Comprehensive Metabolic Panel     Standing Status:   Future     Standing Expiration Date:   1/12/2021    Lipid Panel     Standing Status:   Future     Standing Expiration Date:   1/12/2021     Order Specific Question:   Is Patient Fasting?/# of Hours     Answer:   yes    T4, Free     Standing Status:   Future     Standing Expiration Date:   1/12/2021    TSH without Reflex     Standing Status:   Future     Standing Expiration Date:   1/12/2021     Orders Placed This Encounter   Medications    clotrimazole-betamethasone (LOTRISONE) 1-0.05 % cream     Sig: Apply topically 2 times daily. Dispense:  1 g     Refill:  3       Patientgiven educational materials - see patient instructions. Discussed use, benefit,and side effects of prescribed medications. All patient questions answered. Ptvoiced understanding. Reviewed health maintenance. Instructed to continue currentmedications, diet and exercise. Patient agreed with treatment plan. Follow up asdirected.      Electronically signed by Lissett To MD on 1/13/2020 at 1:38 PM

## 2020-01-30 ENCOUNTER — OFFICE VISIT (OUTPATIENT)
Dept: PRIMARY CARE CLINIC | Age: 75
End: 2020-01-30
Payer: MEDICARE

## 2020-01-30 VITALS
RESPIRATION RATE: 16 BRPM | SYSTOLIC BLOOD PRESSURE: 110 MMHG | OXYGEN SATURATION: 98 % | BODY MASS INDEX: 25.52 KG/M2 | WEIGHT: 153.2 LBS | TEMPERATURE: 97.8 F | HEIGHT: 65 IN | DIASTOLIC BLOOD PRESSURE: 64 MMHG | HEART RATE: 65 BPM

## 2020-01-30 PROCEDURE — 99213 OFFICE O/P EST LOW 20 MIN: CPT | Performed by: FAMILY MEDICINE

## 2020-01-30 PROCEDURE — 99214 OFFICE O/P EST MOD 30 MIN: CPT | Performed by: FAMILY MEDICINE

## 2020-01-30 RX ORDER — OXYBUTYNIN CHLORIDE 10 MG/1
TABLET, EXTENDED RELEASE ORAL
Qty: 90 TABLET | Refills: 3 | Status: SHIPPED | OUTPATIENT
Start: 2020-01-30 | End: 2021-01-14 | Stop reason: SDUPTHER

## 2020-01-30 ASSESSMENT — ENCOUNTER SYMPTOMS
RHINORRHEA: 1
COUGH: 1
SORE THROAT: 1
VOICE CHANGE: 1
ALLERGIC/IMMUNOLOGIC NEGATIVE: 1
GASTROINTESTINAL NEGATIVE: 1
EYES NEGATIVE: 1

## 2020-01-30 ASSESSMENT — PATIENT HEALTH QUESTIONNAIRE - PHQ9
SUM OF ALL RESPONSES TO PHQ QUESTIONS 1-9: 0
SUM OF ALL RESPONSES TO PHQ9 QUESTIONS 1 & 2: 0
2. FEELING DOWN, DEPRESSED OR HOPELESS: 0
SUM OF ALL RESPONSES TO PHQ QUESTIONS 1-9: 0
1. LITTLE INTEREST OR PLEASURE IN DOING THINGS: 0

## 2020-01-30 NOTE — PROGRESS NOTES
2020     Arlene Mckay (:  1945) is a 66 y.o. female, here for evaluation of the following medical concerns:    HPI   Acute urgent care visit for cough and cold symptoms starting in the last 4 days. Sore throat, hoarse voice and progressing cough/tracheitis. No severe fever or body aches. Using some mucinex product. No fever treatment to date. History of pneumonia in the past.  Non smoker. No rad history. Past Medical History:   Diagnosis Date    Bacillus fragilis infection     treated one year Dr Celso Lord Ulysses Bronchiectasis [facility])     Chronic lower back pain 2016    Chronic radicular lumbar pain 10/2/2015    Degenerative disc disease     Hyperlipidemia     Hyperreflexic bladder     Hypertension     Hypothyroidism     Primary osteoarthritis of right knee 2017     Past Surgical History:   Procedure Laterality Date    BREAST SURGERY Left     core biopsy    BRONCHOSCOPY Left 2012    COLONOSCOPY      SIS 10 y    HYSTERECTOMY      and BSO    OTHER SURGICAL HISTORY Right 11    right L4 Transforaminal epidural steroid injection    OTHER SURGICAL HISTORY Right 10/2/15    right L4 TFE    VT TOTAL KNEE ARTHROPLASTY Right 2017    KNEE TOTAL ARTHROPLASTY RIGHT WITH WOO & NEPHEW  AQUAMANTIS   PLATELET GEL performed by Elvis Raphael DO at [address] Right 2017         Review of Systems   Constitutional: Negative. Negative for fever. HENT: Positive for congestion, rhinorrhea, sore throat and voice change. Eyes: Negative. Respiratory: Positive for cough. Cardiovascular: Negative. Gastrointestinal: Negative. Endocrine: Negative. Genitourinary: Negative. Musculoskeletal: Negative. Skin: Negative. Allergic/Immunologic: Negative. Neurological: Negative. Hematological: Negative. Psychiatric/Behavioral: Negative.         Prior to Visit Medications    Medication Sig electronic signature was used to authenticate this note.     --Jaclyn Blanco MD on 1/30/2020 at 1:38 PM

## 2020-01-30 NOTE — ANESTHESIA PRE PROCEDURE
Department of Anesthesiology  Preprocedure Note       Name:  Alcon Escobar   Age:  74 y.o.  :  1945                                          MRN:  [id number]         Date:  2017      Surgeon: Dorys Woods):  Angeles Joe DO    Procedure: Procedure(s):  KNEE TOTAL ARTHROPLASTY RIGHT WITH WOO & NEPHEW  AQUAMANTIS   PLATELET GEL    Medications prior to admission:   Prior to Admission medications    Medication Sig Start Date End Date Taking?  Authorizing Provider   atorvastatin (LIPITOR) 40 MG tablet Take 1 tablet by mouth daily 17  Yes Neto Edwards DO   metoprolol tartrate (LOPRESSOR) 25 MG tablet Take 1 tablet by mouth 2 times daily 17  Yes Neto Edwards DO   lisinopril-hydrochlorothiazide (PRINZIDE;ZESTORETIC) 20-25 MG per tablet Take 1 tablet by mouth every evening  17  Yes Historical Provider, MD   amLODIPine (NORVASC) 10 MG tablet TAKE 1 TABLET DAILY  Patient taking differently: TAKE 1 TABLET DAILY in the morning 6/15/17  Yes Lev Ruiz MD   levothyroxine (SYNTHROID) 50 MCG tablet TAKE 1 TABLET DAILY  Patient taking differently: TAKE 1 TABLET DAILY in the morning 17  Yes Lev Ruiz MD   oxybutynin (DITROPAN-XL) 10 MG extended release tablet TAKE 1 TABLET DAILY  Patient taking differently: TAKE 1 TABLET DAILY in the morning 17  Yes Lev Ruiz MD   Multiple Vitamins-Minerals (MULTIVITAMIN PO) Take 2 tablets by mouth every morning    Yes Historical Provider, MD   FLUAD 0.5 ML JARED inject 0.5 milliliter intramuscularly 17   Historical Provider, MD   mupirocin (BACTROBAN NASAL) 2 % nasal ointment Take by Nasal route 2 times daily for 5 days 17  Pat Alexandre DO       Current medications:    Current Facility-Administered Medications   Medication Dose Route Frequency Provider Last Rate Last Dose    lactated ringers infusion   Intravenous Continuous Robert Santoro MD        sodium chloride flush 0.9 % injection 10 mL  10 mL Intravenous 2 ROM: full  Mouth opening: > = 3 FB Dental:      Comment: Caps     Pulmonary:Negative Pulmonary ROS breath sounds clear to auscultation  (+) pneumonia:                            ROS comment: Bronchiectasis   Cardiovascular:  Exercise tolerance: good (>4 METS),   (+) hypertension: no interval change,         Rhythm: regular  Rate: abnormal           Beta Blocker:  Dose within 24 Hrs      ROS comment: H/o Bigeminy on EKG preop ended up having cardiac evaluation and EKG/ stress test and all negative. On propofol and today with HR in 40s. Will connect to monitor to evaluate. EF was 60%  No new symptoms and good effort tolorance. All d/w pt. PE comment: Sada Osborn    Neuro/Psych:               GI/Hepatic/Renal:   (+) renal disease (hyperreflex bladder ):,           Endo/Other:    (+) hypothyroidism::., .                 Abdominal:       Abdomen: soft. Vascular:                                      Anesthesia Plan      general and regional     ASA 3     (Regional anesthesia explained along with benefits, alternatives and possible complications including infection, bleeding, nerve injury, drug reactions, etc.   Pre Anesthesia Evaluation completed. Anesthesia plan, risks, benefits, and alternatives discussed. The common postop problems such as pain, nausea and vomiting, itching, and shivering immediately postoperatively as well as the possible sore throat and eye irritations for couple of days after. The rare possibilities of dealing with abnormal heart rate, blood pressure, or respiration as well as managing surgical complications, a pre-exciting medical condition, or a medication reaction were also mentioned. I further assured that the anesthesia team will provide the best care and work on avoiding and managing any problem. Patient verbalized an understanding and agreed to proceed.   )  Induction: intravenous. MIPS: Postoperative opioids intended and Prophylactic antiemetics administered.   Anesthetic plan and risks discussed with patient. Plan discussed with CRNA.                   Rome Marie MD   12/19/2017 umbilical and left side hernia

## 2020-05-28 RX ORDER — LEVOTHYROXINE SODIUM 0.05 MG/1
TABLET ORAL
Qty: 90 TABLET | Refills: 3 | Status: SHIPPED | OUTPATIENT
Start: 2020-05-28 | End: 2021-05-24

## 2020-06-04 RX ORDER — AMLODIPINE BESYLATE 10 MG/1
TABLET ORAL
Qty: 90 TABLET | Refills: 3 | Status: SHIPPED | OUTPATIENT
Start: 2020-06-04 | End: 2021-06-01

## 2020-06-15 ENCOUNTER — OFFICE VISIT (OUTPATIENT)
Dept: ORTHOPEDIC SURGERY | Age: 75
End: 2020-06-15
Payer: MEDICARE

## 2020-06-15 VITALS — TEMPERATURE: 97.4 F | WEIGHT: 153.22 LBS | HEIGHT: 65 IN | BODY MASS INDEX: 25.53 KG/M2

## 2020-06-15 PROCEDURE — 99213 OFFICE O/P EST LOW 20 MIN: CPT | Performed by: ORTHOPAEDIC SURGERY

## 2020-06-15 PROCEDURE — 20611 DRAIN/INJ JOINT/BURSA W/US: CPT | Performed by: ORTHOPAEDIC SURGERY

## 2020-06-15 ASSESSMENT — ENCOUNTER SYMPTOMS
APNEA: 0
SHORTNESS OF BREATH: 0
ABDOMINAL DISTENTION: 0
COLOR CHANGE: 0
VOMITING: 0
CONSTIPATION: 0
CHEST TIGHTNESS: 0
DIARRHEA: 0
NAUSEA: 0
COUGH: 0
ABDOMINAL PAIN: 0

## 2020-06-15 NOTE — PROGRESS NOTES
[address] [facility]  [address] [place] [address]  Dept: [phone]  Dept Fax: [phone]          Left Knee - Follow Up     Subjective:     Chief Complaint   Patient presents with    Knee Pain     Left Knee - would like injection     HPI:     Hilario Granados presents today for Left knee pain. The pain has been present for years. The patient recalls a specific injury where the knee was injured after she slipped down her steps at her house. The patient has tried rest with no improvement. The pain is now described as Stabbing  and Sharp kind of like an ice pack. There is pain on weight bearing. The knee has swelled. There is not painful popping and clicking. The knee has not caught or locked up. The knee has not given out. It is stiff upon arising from sitting. It is painful to go up and down stairs and sit for a prolonged time. The patient has not had a cortisone injection. The patient has tried a Hymovis lubrication injection on 03/14/2019 with excellent pain relief for one year. The patient has not tried physical therapy. The patient has not had surgery. The opposite knee is okay and was replaced by Dr. Sarah Caceres on 12/19/2017. Patient states that she would like to try an injection today for her knee pain. Patient also mentioned that she has been having symptoms of plantar fasciitis in her left foot and she wants to know what she can do to treat that pain. ROS:   Review of Systems   Constitutional: Positive for activity change. Negative for appetite change, fatigue and fever. Respiratory: Negative for apnea, cough, chest tightness and shortness of breath. Cardiovascular: Negative for chest pain, palpitations and leg swelling. Gastrointestinal: Negative for abdominal distention, abdominal pain, constipation, diarrhea, nausea and vomiting. Genitourinary: Negative for difficulty urinating, dysuria and hematuria. needed (leg swelling) 30 tablet 5    Methylsulfonylmethane (MSM PO) Take by mouth daily      Nutritional Supplements (SILICA) 98.6 MG CAPS Take by mouth daily      Calcium Carb-Cholecalciferol (CALCIUM 600+D3 PO) Take by mouth 2 times daily      Multiple Vitamins-Minerals (MULTIVITAMIN PO) Take 2 tablets by mouth every morning        Current Facility-Administered Medications   Medication Dose Route Frequency Provider Last Rate Last Dose    lidocaine 1 % injection 4 mL  4 mL Intra-articular Once Christin Graver, DO        methylPREDNISolone acetate (DEPO-MEDROL) injection 40 mg  40 mg Intra-articular Once Christin Graver, DO        lidocaine 1 % injection 4 mL  4 mL Intradermal Once Irma Perez PA-C         Allergies:    Levaquin [levofloxacin in d5w];  Iodine; and Penicillins    Social History:   Social History     Socioeconomic History    Marital status:      Spouse name: None    Number of children: None    Years of education: None    Highest education level: None   Occupational History    None   Social Needs    Financial resource strain: None    Food insecurity     Worry: None     Inability: None    Transportation needs     Medical: None     Non-medical: None   Tobacco Use    Smoking status: Former Smoker     Packs/day: 0.50     Years: 6.00     Pack years: 3.00     Types: Cigarettes     Last attempt to quit: 1960     Years since quittin.1    Smokeless tobacco: Never Used    Tobacco comment: quit 35-40 years ago   Substance and Sexual Activity    Alcohol use: No     Alcohol/week: 0.0 standard drinks    Drug use: No    Sexual activity: None   Lifestyle    Physical activity     Days per week: None     Minutes per session: None    Stress: None   Relationships    Social connections     Talks on phone: None     Gets together: None     Attends Oriental orthodox service: None     Active member of club or organization: None     Attends meetings of clubs or organizations: None Relationship status: None    Intimate partner violence     Fear of current or ex partner: None     Emotionally abused: None     Physically abused: None     Forced sexual activity: None   Other Topics Concern    None   Social History Narrative    None     Family History:  Family History   Problem Relation Age of Onset    Other Mother         Knee pain    No Known Problems Father      Vitals:   Temp 97.4 °F (36.3 °C)   Ht 5' 5\" (1.651 m)   Wt 153 lb 3.5 oz (69.5 kg)   LMP 08/15/1983 (Exact Date)   BMI 25.50 kg/m²  Body mass index is 25.5 kg/m². Physical Examination:     Orthopedics:    GENERAL: Alert and oriented X3 in no acute distress. SKIN: Intact without lesions or ulcerations. NEURO: Musculoskeletal and axillary nerves intact to sensory and motor testing. VASC: Capillary refill is less than 3 seconds. KNEE EXAM    LOCATION: Left Knee  GEN: Alert and oriented X 3, in no acute distress. GAIT: The patient's gait was observed while entering the exam room and was noted to be non antalgic. The extremity is in anatomic alignment. SKIN: Intact without rashes, lesions, or ulcerations. No obvious deformity or swelling. NEURO: The patient responds to light touch throughout left LE. Patellar and Achilles reflexes are 2/4. VASC: The left LE is neurovascularly intact with 2/4 DP and 2/4 PT pulses. Brisk capillary refill. ROM: 5/115 degrees. There is moderate effusion. 5 degree flexion contracture. MUSC: slightly decreased quad tone  LIGAMENT: Lachman's test is Negative with Good endpoint. Anterior drawer Negative. Posterior drawer Negative. There is  No varus instability at 0 degrees and No varus instability at 30 degrees. There is No valgus instability at 0 degrees and No valgus instability at 30 degrees. SPECIAL: Yo test is negative with no clunks, no crepitation, and no pain. PALP: There is medial joint line pain. Assessment:     1. Primary osteoarthritis of left knee    2.  Left knee pain, unspecified chronicity      Procedures:    Procedure: yes    Regular Knee Injection    Location: Left Knee  Procedure: Corticosteroid injection into the knee. The patient was placed in the Supine position on the exam table. The superior lateral portal was identified and marked. The skin was prepped with betadine in a sterile fashion. Utilizing ultrasound for precise placement and clean technique with sterile gloves a 5cc solution containing 4cc of 1.0% Lidocaine with 1cc containing 40mg of Depo-medrol  was injected. There was no resistance to the injection. The wound was cleansed and a band-aid was placed. the patient tolerated the procedure without difficulty. Adverse reactions to the injection were discussed with the patient including signs of infection (increasing pain, redness, swelling) and the patient was instructed to call immediately if experiencing any of these symptoms. Radiology:   KNEE X-RAY    4 views of the left knee including AP, bilateral tunnel, and lateral in the upright position, and skyline views reveal slight varus alignment with no fracture or dislocation. Kellgren grade IV changes of osteoarthritis (joint space narrowing, osteophyte, subchondral sclerosis, bony deformity/cyst) of the medial compartment(s). No osseous loose bodies. No bony erosion or periosteal reaction. No soft tissue masses. Impression: Severe osteoarthritis of the left knee. Plan:   Treatment : I reviewed the X-ray with the patient and I informed her that the knee has reached end stage osteoarthritis in the medial compartment which means she is at a Kellgren grade IV. We discussed the etiologies and natural histories of primary osteoarthritis of the left knee. We discussed the various treatment alternatives including anti-inflammatory medications, physical therapy, injections, further imaging studies and as a last result surgery.  During today's visit, I explained to the patient that her knee has osteoarthritis stretches for her plantar fasciitis so she may have good pain relief and then if the stretches and exercises do not work then she may come back to follow up with Dr. Cathy Espitia. Patient should return to the clinic in 4 weeks to follow up with Elena Doty PA-C, ATC for a hymovis injection. The patient will call the office immediately with any problems. Orders Placed This Encounter   Medications    lidocaine 1 % injection 4 mL    methylPREDNISolone acetate (DEPO-MEDROL) injection 40 mg     Orders Placed This Encounter   Procedures    XR KNEE LEFT (MIN 4 VIEWS)     4V L Knee     Standing Status:   Future     Number of Occurrences:   1     Standing Expiration Date:   7/15/2020     Order Specific Question:   Reason for exam:     Answer:   Knee Pain     I, Edward Day V, am scribing for and in the presence of Kailee Velez D.O. 6/16/2020  1:19 PM        I, Kailee Velez DO, have personally seen this patient and I have reviewed the CC, PMH, FHX and Social History as provided by other clinical staff. I reassessed the HPI and ROS as scribed by Jose M Estrada in my presence and it is both accurate and complete. Thereafter, I personally performed the PE, reviewed the imaging and established the DX and POC. I agree with the documentation provided by the Medical Scribe. I have reviewed all documentation in its entirety prior to providing my signature indicating agreement. Any areas of disagreement are noted on the chart.     Electronically signed by Katlyn Pena DO on 6/16/2020 at 9:04 PM        Electronically signed by Rosana Ricardo PA-C, on 6/16/2020 at 1:19 PM

## 2020-06-15 NOTE — PATIENT INSTRUCTIONS
Patient Education        Plantar Fasciitis: Exercises  Introduction  Here are some examples of exercises for you to try. The exercises may be suggested for a condition or for rehabilitation. Start each exercise slowly. Ease off the exercises if you start to have pain. You will be told when to start these exercises and which ones will work best for you. How to do the exercises  Towel stretch   1. Sit with your legs extended and knees straight. 2. Place a towel around your foot just under the toes. 3. Hold each end of the towel in each hand, with your hands above your knees. 4. Pull back with the towel so that your foot stretches toward you. 5. Hold the position for at least 15 to 30 seconds. 6. Repeat 2 to 4 times a session, up to 5 sessions a day. Calf stretch   This exercise stretches the muscles at the back of the lower leg (the calf) and the Achilles tendon. Do this exercise 3 or 4 times a day, 5 days a week. 1. Stand facing a wall with your hands on the wall at about eye level. Put the leg you want to stretch about a step behind your other leg. 2. Keeping your back heel on the floor, bend your front knee until you feel a stretch in the back leg. 3. Hold the stretch for 15 to 30 seconds. Repeat 2 to 4 times. Plantar fascia and calf stretch   Stretching the plantar fascia and calf muscles can increase flexibility and decrease heel pain. You can do this exercise several times each day and before and after activity. 1. Stand on a step as shown above. Be sure to hold on to the banister. 2. Slowly let your heels down over the edge of the step as you relax your calf muscles. You should feel a gentle stretch across the bottom of your foot and up the back of your leg to your knee. 3. Hold the stretch about 15 to 30 seconds, and then tighten your calf muscle a little to bring your heel back up to the level of the step. Repeat 2 to 4 times.     Towel curls   Make this exercise more challenging by placing

## 2020-06-16 RX ORDER — METHYLPREDNISOLONE ACETATE 40 MG/ML
40 INJECTION, SUSPENSION INTRA-ARTICULAR; INTRALESIONAL; INTRAMUSCULAR; SOFT TISSUE ONCE
Status: COMPLETED | OUTPATIENT
Start: 2020-06-16 | End: 2020-06-16

## 2020-06-16 RX ORDER — LIDOCAINE HYDROCHLORIDE 10 MG/ML
4 INJECTION, SOLUTION INFILTRATION; PERINEURAL ONCE
Status: COMPLETED | OUTPATIENT
Start: 2020-06-16 | End: 2020-06-16

## 2020-06-16 RX ADMIN — METHYLPREDNISOLONE ACETATE 40 MG: 40 INJECTION, SUSPENSION INTRA-ARTICULAR; INTRALESIONAL; INTRAMUSCULAR; SOFT TISSUE at 09:20

## 2020-06-16 RX ADMIN — LIDOCAINE HYDROCHLORIDE 4 ML: 10 INJECTION, SOLUTION INFILTRATION; PERINEURAL at 09:19

## 2020-06-18 ENCOUNTER — TELEPHONE (OUTPATIENT)
Dept: ORTHOPEDIC SURGERY | Age: 75
End: 2020-06-18

## 2020-06-29 RX ORDER — FUROSEMIDE 20 MG/1
TABLET ORAL
Qty: 30 TABLET | Refills: 5 | OUTPATIENT
Start: 2020-06-29

## 2020-07-14 ENCOUNTER — OFFICE VISIT (OUTPATIENT)
Dept: INTERNAL MEDICINE | Age: 75
End: 2020-07-14
Payer: MEDICARE

## 2020-07-14 VITALS
DIASTOLIC BLOOD PRESSURE: 76 MMHG | SYSTOLIC BLOOD PRESSURE: 138 MMHG | WEIGHT: 152 LBS | RESPIRATION RATE: 14 BRPM | HEIGHT: 65 IN | HEART RATE: 68 BPM | BODY MASS INDEX: 25.33 KG/M2

## 2020-07-14 PROCEDURE — G0439 PPPS, SUBSEQ VISIT: HCPCS | Performed by: INTERNAL MEDICINE

## 2020-07-14 PROCEDURE — 99214 OFFICE O/P EST MOD 30 MIN: CPT | Performed by: INTERNAL MEDICINE

## 2020-07-14 PROCEDURE — 99214 OFFICE O/P EST MOD 30 MIN: CPT

## 2020-07-14 RX ORDER — FUROSEMIDE 20 MG/1
20 TABLET ORAL DAILY
Qty: 90 TABLET | Refills: 3 | Status: SHIPPED | OUTPATIENT
Start: 2020-07-14 | End: 2020-11-17 | Stop reason: SDUPTHER

## 2020-07-14 RX ORDER — FUROSEMIDE 20 MG/1
20 TABLET ORAL DAILY
Qty: 30 TABLET | Refills: 0 | Status: SHIPPED | OUTPATIENT
Start: 2020-07-14 | End: 2021-07-27

## 2020-07-14 ASSESSMENT — ENCOUNTER SYMPTOMS
EYE PAIN: 0
BLOOD IN STOOL: 0
VOMITING: 0
DIARRHEA: 0
ABDOMINAL PAIN: 0
NAUSEA: 0
COUGH: 0
CONSTIPATION: 0
SHORTNESS OF BREATH: 0
BACK PAIN: 1

## 2020-07-14 ASSESSMENT — PATIENT HEALTH QUESTIONNAIRE - PHQ9
SUM OF ALL RESPONSES TO PHQ QUESTIONS 1-9: 0
SUM OF ALL RESPONSES TO PHQ QUESTIONS 1-9: 0

## 2020-07-14 ASSESSMENT — LIFESTYLE VARIABLES: HOW OFTEN DO YOU HAVE A DRINK CONTAINING ALCOHOL: 0

## 2020-07-14 NOTE — PROGRESS NOTES
Medicare Annual Wellness Visit  Name: Shahzad Gonzalez Date: 2020   MRN: [id number] Sex: Female   Age: 66 y.o. Ethnicity: Non-/Non    : 1945 Race: Aj Bell is here for Medicare AWV; Hypertension (6 month appt); Hyperlipidemia; Lower Back Pain (chronic); and Foot Pain (left, no visible injury. Heel pain. No injury/trama to area. )    Screenings for behavioral, psychosocial and functional/safety risks, and cognitive dysfunction are all negative except as indicated below. These results, as well as other patient data from the [address] form, are documented in Flowsheets linked to this Encounter. Allergies   Allergen Reactions    Levaquin [Levofloxacin In D5w] Other (See Comments)     Attacked muscles and tendons    Iodine Other (See Comments)     Cantu       Penicillins Other (See Comments)     abcess at site of injection         Prior to Visit Medications    Medication Sig Taking?  Authorizing Provider   furosemide (LASIX) 20 MG tablet Take 1 tablet by mouth daily Yes Gladis Wilkins MD   furosemide (LASIX) 20 MG tablet Take 1 tablet by mouth daily Yes Gladis Wilkins MD   amLODIPine (NORVASC) 10 MG tablet TAKE 1 TABLET DAILY Yes Gladis Wilkins MD   levothyroxine (SYNTHROID) 50 MCG tablet TAKE 1 TABLET DAILY Yes Gladis Wilkins MD   oxybutynin (DITROPAN-XL) 10 MG extended release tablet TAKE 1 TABLET DAILY Yes Gladis Wilkins MD   atorvastatin (LIPITOR) 40 MG tablet TAKE 1 TABLET DAILY Yes Lalito Edwards DO   metoprolol tartrate (LOPRESSOR) 25 MG tablet TAKE 1 TABLET TWICE A DAY Yes Lalito Edwards DO   Calcium Carb-Cholecalciferol (CALCIUM 600+D3 PO) Take by mouth 2 times daily Yes Historical Provider, MD   Multiple Vitamins-Minerals (MULTIVITAMIN PO) Take 2 tablets by mouth every morning  Yes Historical Provider, MD   Methylsulfonylmethane (MSM PO) Take by mouth daily  Historical Provider, MD   Nutritional Supplements (SILICA) 46.7 MG CAPS Take by mouth daily  Historical Provider, MD         Past Medical History:   Diagnosis Date    Bacillus fragilis infection     treated one year Dr Magaly Lin    Bronchiectasis [facility])     Chronic lower back pain 2/5/2016    Chronic radicular lumbar pain 10/2/2015    Degenerative disc disease     Hyperlipidemia     Hyperreflexic bladder     Hypertension     Hypothyroidism     Primary osteoarthritis of right knee 12/20/2017       Past Surgical History:   Procedure Laterality Date    BREAST SURGERY Left 2012    core biopsy    BRONCHOSCOPY Left 6/19/2012    COLONOSCOPY  9/13    SIS 8 y   [address]    and BSO    OTHER SURGICAL HISTORY Right 4/21/11    right L4 Transforaminal epidural steroid injection    OTHER SURGICAL HISTORY Right 10/2/15    right L4 TFE    WA TOTAL KNEE ARTHROPLASTY Right 12/19/2017    KNEE TOTAL ARTHROPLASTY RIGHT WITH WOO & NEPHEW  AQUAMANTIS   PLATELET GEL performed by Hector Cesar DO at [facility] 72 ARTHROPLASTY Right 12/19/2017         Family History   Problem Relation Age of Onset    Other Mother         Knee pain    No Known Problems Father        CareTeam (Including outside providers/suppliers regularly involved in providing care):   Patient Care Team:  Roseann Thapa MD as PCP - General (Internal Medicine)  Roseann Thapa MD as PCP - [facility] Empaneled Provider  Kianna العلي MD as Consulting Physician (Pulmonology)  Sariah Elizabeth MD (Physical Medicine and Rehab)  Konrad Adair MD as Surgeon (Orthopedic Surgery)  Rashida Tang DO as Referring Physician (Sports Medicine)    Wt Readings from Last 3 Encounters:   07/14/20 152 lb (68.9 kg)   06/15/20 153 lb 3.5 oz (69.5 kg)   01/30/20 153 lb 3.2 oz (69.5 kg)     Vitals:    07/14/20 1015   BP: 138/76   Site: Right Upper Arm   Position: Sitting   Cuff Size: Medium Adult   Pulse: 68   Resp: 14   Weight: 152 lb (68.9 kg)   Height: 5' 5\" (1.651 m)     Body mass index is 25.29 12/03/2014, 10/23/2015, 11/23/2016, 11/13/2017    Influenza, Triv, inactivated, subunit, adjuvanted, IM (Fluad 65 yrs and older) 10/16/2018, 12/09/2019    Pneumococcal Conjugate 13-valent ([id number]) 12/03/2014    Pneumococcal Conjugate 7-valent (Lili Kim) 12/01/2007    Zoster Live (Zostavax) 09/06/2011        Health Maintenance   Topic Date Due    DTaP/Tdap/Td vaccine (1 - Tdap) 04/16/1962    Shingles Vaccine (2 of 3) 11/01/2011    Pneumococcal 65+ years Vaccine (2 of 2 - PPSV23) 12/03/2015    Annual Wellness Visit (AWV)  06/20/2019    Lipid screen  07/26/2020    TSH testing  07/26/2020    Potassium monitoring  07/26/2020    Creatinine monitoring  07/26/2020    Flu vaccine (1) 09/01/2020    Hepatitis A vaccine  Aged Out    Hepatitis B vaccine  Aged Out    Hib vaccine  Aged Out    Meningococcal (ACWY) vaccine  Aged Out     Recommendations for Laura Sapiens Due: see orders and patient instructions/AVS.  . Recommended screening schedule for the next 5-10 years is provided to the patient in written form: see Patient Instructions/AVS.    IGrisel LPN, 3/99/1179, performed the documented evaluation under the direct supervision of the attending physician. I, Dr. Dariusz Borges, directly supervised the performance of this Medicare annual wellness visit.

## 2020-07-14 NOTE — PATIENT INSTRUCTIONS
Personalized Preventive Plan for Uzma Sun - 7/14/2020  Medicare offers a range of preventive health benefits. Some of the tests and screenings are paid in full while other may be subject to a deductible, co-insurance, and/or copay. Some of these benefits include a comprehensive review of your medical history including lifestyle, illnesses that may run in your family, and various assessments and screenings as appropriate. After reviewing your medical record and screening and assessments performed today your provider may have ordered immunizations, labs, imaging, and/or referrals for you. A list of these orders (if applicable) as well as your Preventive Care list are included within your After Visit Summary for your review. Other Preventive Recommendations:    · A preventive eye exam performed by an eye specialist is recommended every 1-2 years to screen for glaucoma; cataracts, macular degeneration, and other eye disorders. · A preventive dental visit is recommended every 6 months. · Try to get at least 150 minutes of exercise per week or 10,000 steps per day on a pedometer . · Order or download the FREE \"Exercise & Physical Activity: Your Everyday Guide\" from [employer]. Call 1-428.701.4649 or search [employer] online. · You need 0137-7454 mg of calcium and 0393-4526 IU of vitamin D per day. It is possible to meet your calcium requirement with diet alone, but a vitamin D supplement is usually necessary to meet this goal.  · When exposed to the sun, use a sunscreen that protects against both UVA and UVB radiation with an SPF of 30 or greater. Reapply every 2 to 3 hours or after sweating, drying off with a towel, or swimming. · Always wear a seat belt when traveling in a car. Always wear a helmet when riding a bicycle or motorcycle.

## 2020-07-14 NOTE — PROGRESS NOTES
OZURDEX 12 18 19 - MOD EDEMA AT 3 MONTHS - INCREASING - RETX AND REEVAL 10 WKS. [place] [address]  Dept: [phone]  Dept Fax: [phone]  Loc: [phone]     Timmy Zhao is a 66 y.o. female who presents today for her medical conditions/complaintsas noted below. Timmy Zhao is c/o of   Chief Complaint   Patient presents with    Medicare AWV    Hypertension     6 month appt    Hyperlipidemia    Lower Back Pain     chronic    Foot Pain     left, no visible injury. Heel pain. No injury/trama to area. HPI:     Hypertension   This is a chronic (esential htn) problem. The current episode started more than 1 year ago. The problem has been waxing and waning since onset. The problem is controlled. Pertinent negatives include no chest pain, headaches, neck pain, palpitations or shortness of breath. Hyperlipidemia   This is a chronic problem. The current episode started more than 1 year ago. The problem is controlled. Recent lipid tests were reviewed and are variable. Pertinent negatives include no chest pain or shortness of breath. Other   This is a recurrent (3-General medical exam) problem. The current episode started today. The problem occurs intermittently. The problem has been waxing and waning. Pertinent negatives include no abdominal pain, arthralgias, chest pain, chills, coughing, fever, headaches, nausea, neck pain, numbness, rash, vomiting or weakness. Back Pain   This is a chronic problem. The current episode started more than 1 year ago. The problem occurs intermittently. The problem has been waxing and waning since onset. The pain is present in the lumbar spine. The pain radiates to the left thigh. Pertinent negatives include no abdominal pain, chest pain, dysuria, fever, headaches, numbness or weakness.        No results found for: LABA1C         No results found for: LABMICR  LDL Cholesterol (mg/dL)   Date Value   07/26/2019 93   05/08/2018 82 0    amLODIPine (NORVASC) 10 MG tablet TAKE 1 TABLET DAILY 90 tablet 3    levothyroxine (SYNTHROID) 50 MCG tablet TAKE 1 TABLET DAILY 90 tablet 3    oxybutynin (DITROPAN-XL) 10 MG extended release tablet TAKE 1 TABLET DAILY 90 tablet 3    atorvastatin (LIPITOR) 40 MG tablet TAKE 1 TABLET DAILY 90 tablet 4    metoprolol tartrate (LOPRESSOR) 25 MG tablet TAKE 1 TABLET TWICE A  tablet 4    Calcium Carb-Cholecalciferol (CALCIUM 600+D3 PO) Take by mouth 2 times daily      Multiple Vitamins-Minerals (MULTIVITAMIN PO) Take 2 tablets by mouth every morning       Methylsulfonylmethane (MSM PO) Take by mouth daily      Nutritional Supplements (SILICA) 44.3 MG CAPS Take by mouth daily       Current Facility-Administered Medications   Medication Dose Route Frequency Provider Last Rate Last Dose    lidocaine 1 % injection 4 mL  4 mL Intradermal Once Natalie Romo PA-C         Allergies   Allergen Reactions    Levaquin [Levofloxacin In D5w] Other (See Comments)     Attacked muscles and tendons    Iodine Other (See Comments)     Cantu       Penicillins Other (See Comments)     abcess at site of injection       Health Maintenance   Topic Date Due    DTaP/Tdap/Td vaccine (1 - Tdap) 04/16/1962    Shingles Vaccine (2 of 3) 11/01/2011    Pneumococcal 65+ years Vaccine (2 of 2 - PPSV23) 12/03/2015    Annual Wellness Visit (AWV)  06/20/2019    Lipid screen  07/26/2020    TSH testing  07/26/2020    Potassium monitoring  07/26/2020    Creatinine monitoring  07/26/2020    Flu vaccine (1) 09/01/2020    Hepatitis A vaccine  Aged Out    Hepatitis B vaccine  Aged Out    Hib vaccine  Aged Out    Meningococcal (ACWY) vaccine  Aged Out       Subjective:      Review of Systems   Constitutional: Negative for chills and fever. HENT: Negative for hearing loss. Eyes: Negative for pain and visual disturbance. Respiratory: Negative for cough and shortness of breath.     Cardiovascular: Negative for chest pain, palpitations and leg swelling. Gastrointestinal: Negative for abdominal pain, blood in stool, constipation, diarrhea, nausea and vomiting. Endocrine: Negative for cold intolerance, polydipsia and polyuria. Genitourinary: Negative for difficulty urinating, dysuria and hematuria. Musculoskeletal: Positive for back pain. Negative for arthralgias, gait problem and neck pain. Skin: Negative for pallor and rash. Neurological: Negative for dizziness, weakness, numbness and headaches. Hematological: Negative for adenopathy. Does not bruise/bleed easily. Psychiatric/Behavioral: Negative for confusion. The patient is not nervous/anxious. Objective:     Physical Exam  Vitals signs reviewed. Constitutional:       Appearance: She is well-developed. HENT:      Head: Normocephalic and atraumatic. Eyes:      Pupils: Pupils are equal, round, and reactive to light. Neck:      Musculoskeletal: Neck supple. Cardiovascular:      Rate and Rhythm: Normal rate and regular rhythm. Heart sounds: No murmur. No friction rub. No gallop. Pulmonary:      Effort: Pulmonary effort is normal.      Breath sounds: Normal breath sounds. No wheezing or rales. Abdominal:      General: There is no distension. Palpations: Abdomen is soft. There is no mass. Tenderness: There is no abdominal tenderness. There is no rebound. Musculoskeletal: Normal range of motion. Lymphadenopathy:      Cervical: No cervical adenopathy. Skin:     General: Skin is warm and dry. Findings: No rash. Neurological:      Mental Status: She is alert and oriented to person, place, and time. Cranial Nerves: No cranial nerve deficit (grossly). Psychiatric:         Thought Content:  Thought content normal.        /76 (Site: Right Upper Arm, Position: Sitting, Cuff Size: Medium Adult)   Pulse 68   Resp 14   Ht 5' 5\" (1.651 m)   Wt 152 lb (68.9 kg)   LMP 08/15/1983 (Exact Date)   BMI 25.29 kg/m² Assessment:       Diagnosis Orders   1. Routine general medical examination at a health care facility     2. Essential hypertension  furosemide (LASIX) 20 MG tablet    furosemide (LASIX) 20 MG tablet   3. Other hyperlipidemia     4. Chronic left-sided low back pain with left-sided sciatica     5. Medicare annual wellness visit, subsequent     6. Pain, foot, left, chronic  [facility] - Rina Watters DPM, Podiatry, [place]   7. Other screening mammogram  ASHLEY DIGITAL SCREEN W OR WO CAD BILATERAL   8. Postmenopausal  DEXA AXIAL SKELETON W VERTEBRAL FX ASST             Plan:       Return in about 6 months (around 1/14/2021) for Hypertension, Hyperlipidemia. Orders Placed This Encounter   Procedures    DEXA AXIAL SKELETON W VERTEBRAL FX ASST     Standing Status:   Future     Standing Expiration Date:   7/14/2021    ASHLEY DIGITAL SCREEN W OR WO CAD BILATERAL     Standing Status:   Future     Standing Expiration Date:   9/14/2021   [facility] - Rina Watters DPM, Podiatry, [place]     Referral Priority:   Routine     Referral Type:   Eval and Treat     Referral Reason:   Specialty Services Required     Referred to Provider:   Ramiro Vegas DPM     Requested Specialty:   Podiatry     Number of Visits Requested:   1     Orders Placed This Encounter   Medications    furosemide (LASIX) 20 MG tablet     Sig: Take 1 tablet by mouth daily     Dispense:  90 tablet     Refill:  3    furosemide (LASIX) 20 MG tablet     Sig: Take 1 tablet by mouth daily     Dispense:  30 tablet     Refill:  0       Patientgiven educational materials - see patient instructions. Discussed use, benefit,and side effects of prescribed medications. All patient questions answered. Ptvoiced understanding. Reviewed health maintenance. Instructed to continue currentmedications, diet and exercise. Patient agreed with treatment plan. Follow up asdirected.      Electronically signed by John Mckeon MD on 7/14/2020 at 10:28 AM

## 2020-07-15 ENCOUNTER — HOSPITAL ENCOUNTER (OUTPATIENT)
Dept: LAB | Age: 75
Discharge: HOME | End: 2020-07-15
Payer: MEDICARE

## 2020-07-15 LAB
ALBUMIN SERPL-MCNC: 4.4 G/DL (ref 3.5–5.2)
ALBUMIN/GLOBULIN RATIO: 1.7 (ref 1–2.5)
ALP BLD-CCNC: 92 U/L (ref 35–104)
ALT SERPL-CCNC: 36 U/L (ref 5–33)
ANION GAP SERPL CALCULATED.3IONS-SCNC: 11 MMOL/L (ref 9–17)
AST SERPL-CCNC: 27 U/L
BILIRUB SERPL-MCNC: 0.53 MG/DL (ref 0.3–1.2)
BUN BLDV-MCNC: 22 MG/DL (ref 8–23)
BUN/CREAT BLD: 28 (ref 9–20)
CALCIUM SERPL-MCNC: 9.6 MG/DL (ref 8.6–10.4)
CHLORIDE BLD-SCNC: 103 MMOL/L (ref 98–107)
CHOLESTEROL/HDL RATIO: 2.3
CHOLESTEROL: 170 MG/DL
CO2: 29 MMOL/L (ref 20–31)
CREAT SERPL-MCNC: 0.79 MG/DL (ref 0.5–0.9)
GFR AFRICAN AMERICAN: >60 ML/MIN
GFR NON-AFRICAN AMERICAN: >60 ML/MIN
GFR SERPL CREATININE-BSD FRML MDRD: ABNORMAL ML/MIN/{1.73_M2}
GFR SERPL CREATININE-BSD FRML MDRD: ABNORMAL ML/MIN/{1.73_M2}
GLUCOSE BLD-MCNC: 104 MG/DL (ref 70–99)
HDLC SERPL-MCNC: 73 MG/DL
LDL CHOLESTEROL: 82 MG/DL (ref 0–130)
POTASSIUM SERPL-SCNC: 4 MMOL/L (ref 3.7–5.3)
SODIUM BLD-SCNC: 143 MMOL/L (ref 135–144)
THYROXINE, FREE: 1.21 NG/DL (ref 0.93–1.7)
TOTAL PROTEIN: 7 G/DL (ref 6.4–8.3)
TRIGL SERPL-MCNC: 77 MG/DL
TSH SERPL DL<=0.05 MIU/L-ACNC: 2.35 MIU/L (ref 0.3–5)
VLDLC SERPL CALC-MCNC: NORMAL MG/DL (ref 1–30)

## 2020-07-15 PROCEDURE — 80061 LIPID PANEL: CPT

## 2020-07-15 PROCEDURE — 84443 ASSAY THYROID STIM HORMONE: CPT

## 2020-07-15 PROCEDURE — 80053 COMPREHEN METABOLIC PANEL: CPT

## 2020-07-15 PROCEDURE — 36415 COLL VENOUS BLD VENIPUNCTURE: CPT

## 2020-07-15 PROCEDURE — 84439 ASSAY OF FREE THYROXINE: CPT

## 2020-07-21 ENCOUNTER — OFFICE VISIT (OUTPATIENT)
Dept: PODIATRY | Age: 75
End: 2020-07-21
Payer: MEDICARE

## 2020-07-21 ENCOUNTER — HOSPITAL ENCOUNTER (OUTPATIENT)
Dept: GENERAL RADIOLOGY | Age: 75
Discharge: HOME | End: 2020-07-23
Payer: MEDICARE

## 2020-07-21 VITALS
DIASTOLIC BLOOD PRESSURE: 60 MMHG | BODY MASS INDEX: 25.06 KG/M2 | RESPIRATION RATE: 20 BRPM | HEART RATE: 64 BPM | SYSTOLIC BLOOD PRESSURE: 122 MMHG | WEIGHT: 150.6 LBS

## 2020-07-21 PROCEDURE — 99214 OFFICE O/P EST MOD 30 MIN: CPT

## 2020-07-21 PROCEDURE — 99203 OFFICE O/P NEW LOW 30 MIN: CPT | Performed by: PODIATRIST

## 2020-07-21 PROCEDURE — 73650 X-RAY EXAM OF HEEL: CPT

## 2020-07-21 RX ORDER — METHYLPREDNISOLONE 4 MG/1
TABLET ORAL
Qty: 1 KIT | Refills: 0 | Status: SHIPPED | OUTPATIENT
Start: 2020-07-21 | End: 2021-01-14

## 2020-07-21 NOTE — PROGRESS NOTES
Subjective:    Sarah Adjutant is a 66 y.o. female who presents to the office today complaining of Left heel pain. Symptoms began  month(s) ago. Patient relates pain is Present upon arising from bed in the morning and after periods of rest and then standing. The pain progresses throughout the day. Pain is rated 7 out of 10 and is described as moderate. Treatments prior to today's visit include: stretch, ice. Currently denies F/C/N/V. Allergies   Allergen Reactions    Levaquin [Levofloxacin In D5w] Other (See Comments)     Attacked muscles and tendons    Iodine Other (See Comments)     Cantu       Penicillins Other (See Comments)     abcess at site of injection       Past Medical History:   Diagnosis Date    Bacillus fragilis infection     treated one year Dr Abarca Situ    Bronchiectasis [facility])     Chronic lower back pain 2/5/2016    Chronic radicular lumbar pain 10/2/2015    Degenerative disc disease     Hyperlipidemia     Hyperreflexic bladder     Hypertension     Hypothyroidism     Primary osteoarthritis of right knee 12/20/2017       Prior to Admission medications    Medication Sig Start Date End Date Taking?  Authorizing Provider   methylPREDNISolone (MEDROL DOSEPACK) 4 MG tablet Take by mouth. 7/21/20  Yes Jailene Hu DPM   furosemide (LASIX) 20 MG tablet Take 1 tablet by mouth daily 7/14/20  Yes Barb Rodríguez MD   furosemide (LASIX) 20 MG tablet Take 1 tablet by mouth daily 7/14/20  Yes Barb Rodríguez MD   amLODIPine (NORVASC) 10 MG tablet TAKE 1 TABLET DAILY 6/4/20  Yes Barb Rodríguez MD   levothyroxine (SYNTHROID) 50 MCG tablet TAKE 1 TABLET DAILY 5/28/20  Yes Barb Rodríguez MD   oxybutynin (DITROPAN-XL) 10 MG extended release tablet TAKE 1 TABLET DAILY 1/30/20  Yes Barb Rodríguez MD   atorvastatin (LIPITOR) 40 MG tablet TAKE 1 TABLET DAILY 12/27/19  Yes Lalito Edwards DO   metoprolol tartrate (LOPRESSOR) 25 MG tablet TAKE 1 TABLET TWICE A DAY 11/25/19  Yes Lalito Edwards bilateral. Distal Rubor absent bilateral.  Temperature within normal limits bilateral. Hyperpigmentation absent bilateral. No atrophic skin. Neurological: Sensation intact to light touch to level of digits, bilateral.  Protective sensation intact 10/10 sites via 5.07/10g [place] Monofilament, bilateral.  negative Tinel's, bilateral.  negative Valleix sign, bilateral.  Vibratory intact bilateral.  Reflexes Decreased bilateral.  Paresthesias negative. Dysthesias negative. Sharp/dull intact bilateral.    Musculoskeletal: Muscle strength 5/5, Bilateral.  Pain present upon palpation of medial calcaneal tubercle, Left. within normal limits medial longitudinal arch, Bilateral.  Ankle ROM decreased,Bilateral.  1st MPJ ROM within normal limits, Bilateral.  Dorsally contracted digits absent digits 0, Bilateral. Other foot deformities none. Integument: Warm, dry, supple, bilateral.  Open lesion absent, bilateral.  Interdigital maceration absent to web spaces bilateral.  Nails mycotic. Erich Drones Fissures absent, bilateral. Hyperkeratotic tissue is absent. Asessment: Patient is a 66 y.o. female with:    Diagnosis Orders   1. Plantar fasciitis, left  XR CALCANEUS LEFT (MIN 2 VIEWS)   2. Pain of left heel  XR CALCANEUS LEFT (MIN 2 VIEWS)       Plan: Patient examined and evaluated. Current condition and treatment options discussed in detail. Patient was given plantar fasciitis instruction sheet. Patient will start stretching, icing, anti-inflammatory, and arch supports in shoe gear 100% of the time WB. Patient will not go barefoot. Continue night splint at home  Orders Placed This Encounter   Medications    methylPREDNISolone (MEDROL DOSEPACK) 4 MG tablet     Sig: Take by mouth.      Dispense:  1 kit     Refill:  0     Orders Placed This Encounter   Procedures    XR CALCANEUS LEFT (MIN 2 VIEWS)     Standing Status:   Future     Standing Expiration Date:   7/22/2021     Scheduling Instructions:      WB   Pt given tx options for anti fungal therapy. Pt educated on Rx po lamisil, Rx topical Penlac, OTC home remedies or other OTC topical meds. Contact office with any questions/problems/concerns. Return to office in 3week(s).

## 2020-07-27 ENCOUNTER — TELEPHONE (OUTPATIENT)
Dept: ORTHOPEDIC SURGERY | Age: 75
End: 2020-07-27

## 2020-07-29 ENCOUNTER — TELEPHONE (OUTPATIENT)
Dept: ORTHOPEDIC SURGERY | Age: 75
End: 2020-07-29

## 2020-08-14 ENCOUNTER — OFFICE VISIT (OUTPATIENT)
Dept: ORTHOPEDIC SURGERY | Age: 75
End: 2020-08-14
Payer: MEDICARE

## 2020-08-14 VITALS — BODY MASS INDEX: 24.99 KG/M2 | WEIGHT: 150 LBS | HEIGHT: 65 IN

## 2020-08-14 PROCEDURE — 20611 DRAIN/INJ JOINT/BURSA W/US: CPT | Performed by: PHYSICIAN ASSISTANT

## 2020-08-14 RX ORDER — LIDOCAINE HYDROCHLORIDE 10 MG/ML
4 INJECTION, SOLUTION INFILTRATION; PERINEURAL ONCE
Status: COMPLETED | OUTPATIENT
Start: 2020-08-14 | End: 2020-08-14

## 2020-08-14 RX ADMIN — LIDOCAINE HYDROCHLORIDE 4 ML: 10 INJECTION, SOLUTION INFILTRATION; PERINEURAL at 10:06

## 2020-08-14 NOTE — PROGRESS NOTES
[facility]  [address] [place] [id number]  Dept: [phone]  Dept Fax: [phone]          Left Knee Visit - Follow up    Subjective:     Chief Complaint   Patient presents with    Knee Pain     left knee pain, cortisone- 6/15/20     HPI:     Lizzette Reyna presents today for Left knee pain. Patient is here today for a gel-one injection into the Left knee. Her last cortisone injection was on 06/15/2020. She has had a Hymovis lubrication injection on 03/14/2019 with excellent pain relief for one year. I have reviewed the CC, and if not present in this note, I have reviewed in the patient's chart. I agree with the documentation provided by other staff and have reviewed their documentation prior to providing my signature indicating agreement. Vitals:   Ht 5' 5\" (1.651 m)   Wt 150 lb (68 kg)   LMP 08/15/1983 (Exact Date)   BMI 24.96 kg/m²  Body mass index is 24.96 kg/m². Assessment:     1. Primary osteoarthritis of left knee      Procedures:    Procedure: Yes    Arthrocentesis    Location: Left Knee  Procedure: After consent was obtained, using sterile technique the Left knee was prepped and plain Lidocaine 1% was used as local anesthetic. The joint was entered and 16 cc's of yellow colored fluid was withdrawn. The procedure was well tolerated. The patient is asked to continue to rest the joint for a few more days before resuming regular activities. It may be more painful for the first 1-2 days. Watch for fever, or increased swelling or persistent pain in the joint. Call or return to clinic prn if such symptoms occur or there is failure to improve as anticipated. Gel-One Injection    Location: Left Knee  Procedure: Lizzette Reyna agreed for the Gel-One injection into the left knee. The patient was placed in the supine position on the exam table.  The junction of the superior portion of the patella and the lateral

## 2020-11-17 RX ORDER — FUROSEMIDE 20 MG/1
20 TABLET ORAL DAILY
Qty: 14 TABLET | Refills: 0 | Status: SHIPPED | OUTPATIENT
Start: 2020-11-17 | End: 2021-01-14

## 2020-11-17 NOTE — TELEPHONE ENCOUNTER
Pharmacy requesting a refill of the below medication which has been pended for you:     Requested Prescriptions     Pending Prescriptions Disp Refills    furosemide (LASIX) 20 MG tablet 14 tablet 0     Sig: Take 1 tablet by mouth daily       Last Appointment Date: 7/14/2020  Next Appointment Date: 1/14/2021    Allergies   Allergen Reactions    Levaquin [Levofloxacin In D5w] Other (See Comments)     Attacked muscles and tendons    Iodine Other (See Comments)     Cantu       Penicillins Other (See Comments)     abcess at site of injection

## 2021-01-14 ENCOUNTER — OFFICE VISIT (OUTPATIENT)
Dept: INTERNAL MEDICINE | Age: 76
End: 2021-01-14
Payer: MEDICARE

## 2021-01-14 VITALS
DIASTOLIC BLOOD PRESSURE: 70 MMHG | SYSTOLIC BLOOD PRESSURE: 118 MMHG | HEART RATE: 67 BPM | WEIGHT: 153 LBS | BODY MASS INDEX: 25.49 KG/M2 | RESPIRATION RATE: 16 BRPM | HEIGHT: 65 IN

## 2021-01-14 DIAGNOSIS — E78.49 OTHER HYPERLIPIDEMIA: ICD-10-CM

## 2021-01-14 DIAGNOSIS — N32.89 SPASTIC BLADDER: ICD-10-CM

## 2021-01-14 DIAGNOSIS — M25.561 CHRONIC PAIN OF BOTH KNEES: ICD-10-CM

## 2021-01-14 DIAGNOSIS — E03.9 HYPOTHYROIDISM (ACQUIRED): ICD-10-CM

## 2021-01-14 DIAGNOSIS — G89.29 CHRONIC PAIN OF BOTH KNEES: ICD-10-CM

## 2021-01-14 DIAGNOSIS — I10 ESSENTIAL HYPERTENSION: Primary | ICD-10-CM

## 2021-01-14 DIAGNOSIS — M25.562 CHRONIC PAIN OF BOTH KNEES: ICD-10-CM

## 2021-01-14 PROCEDURE — 99214 OFFICE O/P EST MOD 30 MIN: CPT

## 2021-01-14 PROCEDURE — 99214 OFFICE O/P EST MOD 30 MIN: CPT | Performed by: INTERNAL MEDICINE

## 2021-01-14 RX ORDER — OXYBUTYNIN CHLORIDE 10 MG/1
TABLET, EXTENDED RELEASE ORAL
Qty: 90 TABLET | Refills: 3 | Status: SHIPPED | OUTPATIENT
Start: 2021-01-14 | End: 2022-01-10

## 2021-01-14 RX ORDER — ATORVASTATIN CALCIUM 40 MG/1
TABLET, FILM COATED ORAL
Qty: 90 TABLET | Refills: 3 | Status: SHIPPED | OUTPATIENT
Start: 2021-01-14 | End: 2022-02-10

## 2021-01-14 ASSESSMENT — PATIENT HEALTH QUESTIONNAIRE - PHQ9
2. FEELING DOWN, DEPRESSED OR HOPELESS: 0
SUM OF ALL RESPONSES TO PHQ QUESTIONS 1-9: 0
1. LITTLE INTEREST OR PLEASURE IN DOING THINGS: 0
SUM OF ALL RESPONSES TO PHQ9 QUESTIONS 1 & 2: 0
SUM OF ALL RESPONSES TO PHQ QUESTIONS 1-9: 0

## 2021-01-14 ASSESSMENT — ENCOUNTER SYMPTOMS
EYE PAIN: 0
BLOOD IN STOOL: 0
ABDOMINAL PAIN: 0
DIARRHEA: 0
NAUSEA: 0
CONSTIPATION: 0
COUGH: 0
VOMITING: 0
SHORTNESS OF BREATH: 0
BACK PAIN: 0

## 2021-01-14 NOTE — PROGRESS NOTES
[facility]  [address] [place], [place] [id number]  Dept: [phone]  Dept Fax: [phone]  Loc: [phone]     Mercy Sarmiento is a 66 y.o. female who presents today for her medical conditions/complaintsas noted below. Mercy Sarmiento is c/o of   Chief Complaint   Patient presents with    Hypertension     6 month    Hyperlipidemia    Knee Pain     Chronic L         HPI:     Hypertension  This is a chronic (essential htn) problem. The current episode started more than 1 year ago. The problem has been waxing and waning since onset. The problem is controlled. Pertinent negatives include no chest pain, headaches, neck pain, palpitations or shortness of breath. Hyperlipidemia  This is a chronic problem. The current episode started more than 1 year ago. The problem is controlled. Recent lipid tests were reviewed and are variable. Pertinent negatives include no chest pain or shortness of breath. Knee Pain   The incident occurred more than 1 week ago. The incident occurred at home. There was no injury mechanism. The pain is present in the left knee and right knee. The pain is mild. The pain has been fluctuating since onset. Pertinent negatives include no numbness.        No results found for: LABA1C         No results found for: LABMICR  LDL Cholesterol (mg/dL)   Date Value   07/15/2020 82   07/26/2019 93   05/08/2018 82         AST (U/L)   Date Value   07/15/2020 27     ALT (U/L)   Date Value   07/15/2020 36 (H)     BUN (mg/dL)   Date Value   07/15/2020 22     BP Readings from Last 3 Encounters:   01/14/21 118/70   07/21/20 122/60   07/14/20 138/76              Past Medical History:   Diagnosis Date    Bacillus fragilis infection     treated one year Dr Ángela Pandey   [facility] Bronchiectasis [facility])     Chronic lower back pain 2/5/2016    Chronic radicular lumbar pain 10/2/2015    Degenerative disc disease     Hyperlipidemia     Hyperreflexic bladder     Hypertension     Hypothyroidism     Primary osteoarthritis of right knee 2017      Past Surgical History:   Procedure Laterality Date    BREAST SURGERY Left 2012    core biopsy    BRONCHOSCOPY Left 2012    COLONOSCOPY      SIS 8 y   [address]    and BSO    OTHER SURGICAL HISTORY Right 11    right L4 Transforaminal epidural steroid injection    OTHER SURGICAL HISTORY Right 10/2/15    right L4 TFE    CA TOTAL KNEE ARTHROPLASTY Right 2017    KNEE TOTAL ARTHROPLASTY RIGHT WITH WOO & NEPHEW  AQUAMANTIS   PLATELET GEL performed by Broadus Bumpers, DO at [place] [id number] Right 2017       Family History   Problem Relation Age of Onset    Other Mother         Knee pain    No Known Problems Father           Social History     Tobacco Use    Smoking status: Former Smoker     Packs/day: 0.50     Years: 6.00     Pack years: 3.00     Types: Cigarettes     Quit date: 1960     Years since quittin.6    Smokeless tobacco: Never Used    Tobacco comment: quit 35-40 years ago   Substance Use Topics    Alcohol use: No     Alcohol/week: 0.0 standard drinks         Current Outpatient Medications   Medication Sig Dispense Refill    oxybutynin (DITROPAN-XL) 10 MG extended release tablet TAKE 1 TABLET DAILY 90 tablet 3    atorvastatin (LIPITOR) 40 MG tablet TAKE 1 TABLET DAILY 90 tablet 3    metoprolol tartrate (LOPRESSOR) 25 MG tablet TAKE 1 TABLET TWICE A  tablet 3    furosemide (LASIX) 20 MG tablet Take 1 tablet by mouth daily 30 tablet 0    amLODIPine (NORVASC) 10 MG tablet TAKE 1 TABLET DAILY 90 tablet 3    levothyroxine (SYNTHROID) 50 MCG tablet TAKE 1 TABLET DAILY 90 tablet 3    Calcium Carb-Cholecalciferol (CALCIUM 600+D3 PO) Take by mouth 2 times daily      Multiple Vitamins-Minerals (MULTIVITAMIN PO) Take 2 tablets by mouth every morning       Methylsulfonylmethane (MSM PO) Take by mouth daily      Nutritional Supplements (SILICA) 90.5 MG CAPS Take by mouth daily       Current Facility-Administered Medications   Medication Dose Route Frequency Provider Last Rate Last Admin    lidocaine 1 % injection 4 mL  4 mL Intradermal Once Fred Cohen PA-C         Allergies   Allergen Reactions    Levaquin [Levofloxacin In D5w] Other (See Comments)     Attacked muscles and tendons    Iodine Other (See Comments)     Cantu       Penicillins Other (See Comments)     abcess at site of injection       Health Maintenance   Topic Date Due    Hepatitis C screen  1945    DTaP/Tdap/Td vaccine (1 - Tdap) 04/16/1962    Shingles Vaccine (3 of 3) 12/15/2020    Lipid screen  07/15/2021    Annual Wellness Visit (AWV)  07/15/2021    TSH testing  07/15/2021    Potassium monitoring  07/15/2021    Creatinine monitoring  07/15/2021    Flu vaccine  Completed    Pneumococcal 65+ years Vaccine  Completed    Hepatitis A vaccine  Aged Out    Hepatitis B vaccine  Aged Out    Hib vaccine  Aged Out    Meningococcal (ACWY) vaccine  Aged Out       Subjective:      Review of Systems   Constitutional: Negative for chills and fever. HENT: Negative for hearing loss. Eyes: Negative for pain and visual disturbance. Respiratory: Negative for cough and shortness of breath. Cardiovascular: Negative for chest pain, palpitations and leg swelling. Gastrointestinal: Negative for abdominal pain, blood in stool, constipation, diarrhea, nausea and vomiting. Endocrine: Negative for cold intolerance, polydipsia and polyuria. Genitourinary: Negative for difficulty urinating, dysuria and hematuria. Musculoskeletal: Negative for arthralgias, back pain, gait problem and neck pain. Skin: Negative for pallor and rash. Neurological: Negative for dizziness, weakness, numbness and headaches. Hematological: Negative for adenopathy. Does not bruise/bleed easily.    Psychiatric/Behavioral: Negative for confusion. The patient is not nervous/anxious. Objective:     Physical Exam  Vitals signs reviewed. Constitutional:       Appearance: She is well-developed. HENT:      Head: Normocephalic and atraumatic. Eyes:      Pupils: Pupils are equal, round, and reactive to light. Neck:      Musculoskeletal: Neck supple. Cardiovascular:      Rate and Rhythm: Normal rate and regular rhythm. Heart sounds: No murmur. No friction rub. No gallop. Pulmonary:      Effort: Pulmonary effort is normal.      Breath sounds: Normal breath sounds. No wheezing or rales. Abdominal:      General: There is no distension. Palpations: Abdomen is soft. There is no mass. Tenderness: There is no abdominal tenderness. There is no rebound. Musculoskeletal: Normal range of motion. Lymphadenopathy:      Cervical: No cervical adenopathy. Skin:     General: Skin is warm and dry. Findings: No rash. Neurological:      Mental Status: She is alert and oriented to person, place, and time. Cranial Nerves: No cranial nerve deficit (grossly). Psychiatric:         Thought Content: Thought content normal.        /70 (Site: Left Upper Arm, Position: Sitting, Cuff Size: Medium Adult)   Pulse 67   Resp 16   Ht 5' 5\" (1.651 m)   Wt 153 lb (69.4 kg)   LMP 08/15/1983 (Exact Date)   BMI 25.46 kg/m²     Assessment:       Diagnosis Orders   1. Essential hypertension  Comprehensive Metabolic Panel, Fasting    metoprolol tartrate (LOPRESSOR) 25 MG tablet   2. Spastic bladder  oxybutynin (DITROPAN-XL) 10 MG extended release tablet   3. Other hyperlipidemia  Lipid Panel    atorvastatin (LIPITOR) 40 MG tablet   4. Chronic pain of both knees     5. Hypothyroidism (acquired)  TSH    T4, Free   I reviewed multiple test results for this appointment. Including patient's last total cholesterol normal at 170. Last glucose okay at 104. Also normal TSH and normal free T4.     Patient told to continue her Lipitor Synthroid and other prescription medications. Plan:       Return in about 6 months (around 7/16/2021) for medicare AWV, Hypertension, Hyperlipidemia. Orders Placed This Encounter   Procedures    TSH     Standing Status:   Future     Standing Expiration Date:   1/14/2022    T4, Free     Standing Status:   Future     Standing Expiration Date:   1/14/2022    Comprehensive Metabolic Panel, Fasting     Standing Status:   Future     Standing Expiration Date:   1/14/2022    Lipid Panel     Standing Status:   Future     Standing Expiration Date:   1/14/2022     Order Specific Question:   Is Patient Fasting?/# of Hours     Answer:   yes     Orders Placed This Encounter   Medications    oxybutynin (DITROPAN-XL) 10 MG extended release tablet     Sig: TAKE 1 TABLET DAILY     Dispense:  90 tablet     Refill:  3    atorvastatin (LIPITOR) 40 MG tablet     Sig: TAKE 1 TABLET DAILY     Dispense:  90 tablet     Refill:  3    metoprolol tartrate (LOPRESSOR) 25 MG tablet     Sig: TAKE 1 TABLET TWICE A DAY     Dispense:  180 tablet     Refill:  3       Patientgiven educational materials - see patient instructions. Discussed use, benefit,and side effects of prescribed medications. All patient questions answered. Ptvoiced understanding. Reviewed health maintenance. Instructed to continue currentmedications, diet and exercise. Patient agreed with treatment plan. Follow up asdirected.      Electronically signed by Dorian Estes MD on 1/14/2021 at 10:15 AM

## 2021-02-22 ENCOUNTER — OFFICE VISIT (OUTPATIENT)
Dept: ORTHOPEDIC SURGERY | Age: 76
End: 2021-02-22
Payer: MEDICARE

## 2021-02-22 VITALS
WEIGHT: 153 LBS | TEMPERATURE: 97.3 F | HEIGHT: 65 IN | RESPIRATION RATE: 12 BRPM | HEART RATE: 78 BPM | BODY MASS INDEX: 25.49 KG/M2 | OXYGEN SATURATION: 98 %

## 2021-02-22 DIAGNOSIS — M17.12 PRIMARY OSTEOARTHRITIS OF LEFT KNEE: Primary | ICD-10-CM

## 2021-02-22 PROCEDURE — 20611 DRAIN/INJ JOINT/BURSA W/US: CPT | Performed by: PHYSICIAN ASSISTANT

## 2021-02-22 PROCEDURE — 99212 OFFICE O/P EST SF 10 MIN: CPT | Performed by: PHYSICIAN ASSISTANT

## 2021-02-22 RX ORDER — METHYLPREDNISOLONE ACETATE 40 MG/ML
40 INJECTION, SUSPENSION INTRA-ARTICULAR; INTRALESIONAL; INTRAMUSCULAR; SOFT TISSUE ONCE
Status: COMPLETED | OUTPATIENT
Start: 2021-02-22 | End: 2021-02-22

## 2021-02-22 RX ORDER — LIDOCAINE HYDROCHLORIDE 10 MG/ML
4 INJECTION, SOLUTION INFILTRATION; PERINEURAL ONCE
Status: COMPLETED | OUTPATIENT
Start: 2021-02-22 | End: 2021-02-22

## 2021-02-22 RX ADMIN — LIDOCAINE HYDROCHLORIDE 4 ML: 10 INJECTION, SOLUTION INFILTRATION; PERINEURAL at 14:25

## 2021-02-22 RX ADMIN — METHYLPREDNISOLONE ACETATE 40 MG: 40 INJECTION, SUSPENSION INTRA-ARTICULAR; INTRALESIONAL; INTRAMUSCULAR; SOFT TISSUE at 14:24

## 2021-02-22 ASSESSMENT — ENCOUNTER SYMPTOMS
VOMITING: 0
ABDOMINAL PAIN: 0
RESPIRATORY NEGATIVE: 1
COLOR CHANGE: 0
APNEA: 0
COUGH: 0
ABDOMINAL DISTENTION: 0
SHORTNESS OF BREATH: 0
CHEST TIGHTNESS: 0
CONSTIPATION: 0
DIARRHEA: 0
NAUSEA: 0

## 2021-02-22 NOTE — PROGRESS NOTES
[address] AND SPORTS MEDICINE  [address] [place] [address]  Dept: [phone]  Dept Fax: [phone]          Left Knee - Follow Up     Subjective:     Chief Complaint   Patient presents with    Knee Pain     left knee      HPI:     Tala Owen presents today for Left knee pain. The pain has been present for years. The patient recalls a specific injury where the knee was injured after she slipped down her steps at her house. The patient has tried rest with no improvement. The pain is now described as Stabbing  and Sharp kind of like an ice pack. There is pain on weight bearing. The knee has swelled. There is not painful popping and clicking. The knee has not caught or locked up. The knee has not given out. It is stiff upon arising from sitting. It is painful to go up and down stairs and sit for a prolonged time. The patient has not had a cortisone injection. The patient has tried a Hymovis lubrication injection on 03/14/2019 with excellent pain relief for one year. The patient has not tried physical therapy. The patient has not had surgery. The opposite knee is okay and was replaced by Dr. Ludy Chavez on 12/19/2017. Patient states that she would like to try an injection today for her knee. ROS:   Review of Systems   Constitutional: Positive for activity change. Negative for appetite change, fatigue and fever. Respiratory: Negative. Negative for apnea, cough, chest tightness and shortness of breath. Cardiovascular: Negative. Negative for chest pain, palpitations and leg swelling. Gastrointestinal: Negative for abdominal distention, abdominal pain, constipation, diarrhea, nausea and vomiting. Genitourinary: Negative for difficulty urinating, dysuria and hematuria. Musculoskeletal: Positive for arthralgias, gait problem and joint swelling. Negative for myalgias. Skin: Negative for color change and rash.    Neurological: Negative for dizziness, weakness, numbness and headaches. Psychiatric/Behavioral: Positive for sleep disturbance.      Past Medical History:    Past Medical History:   Diagnosis Date    Bacillus fragilis infection     treated one year Dr Jennifer Sutton Bronchiectasis [facility])     Chronic lower back pain 2/5/2016    Chronic radicular lumbar pain 10/2/2015    Degenerative disc disease     Hyperlipidemia     Hyperreflexic bladder     Hypertension     Hypothyroidism     Primary osteoarthritis of right knee 12/20/2017     Past Surgical History:    Past Surgical History:   Procedure Laterality Date    BREAST SURGERY Left 2012    core biopsy    BRONCHOSCOPY Left 6/19/2012    COLONOSCOPY  9/13    SIS 10 y   [address]    and BSO    OTHER SURGICAL HISTORY Right 4/21/11    right L4 Transforaminal epidural steroid injection    OTHER SURGICAL HISTORY Right 10/2/15    right L4 TFE    [place] TOTAL KNEE ARTHROPLASTY Right 12/19/2017    KNEE TOTAL ARTHROPLASTY RIGHT WITH WOO & NEPHEW  AQUAMANTIS   PLATELET GEL performed by Kale Garber DO at [place] [id number] Right 12/19/2017     Current Medications:   Current Outpatient Medications   Medication Sig Dispense Refill    oxybutynin (DITROPAN-XL) 10 MG extended release tablet TAKE 1 TABLET DAILY 90 tablet 3    atorvastatin (LIPITOR) 40 MG tablet TAKE 1 TABLET DAILY 90 tablet 3    metoprolol tartrate (LOPRESSOR) 25 MG tablet TAKE 1 TABLET TWICE A  tablet 3    furosemide (LASIX) 20 MG tablet Take 1 tablet by mouth daily 30 tablet 0    amLODIPine (NORVASC) 10 MG tablet TAKE 1 TABLET DAILY 90 tablet 3    levothyroxine (SYNTHROID) 50 MCG tablet TAKE 1 TABLET DAILY 90 tablet 3    Methylsulfonylmethane (MSM PO) Take by mouth daily      Nutritional Supplements (SILICA) 02.4 MG CAPS Take by mouth daily      Calcium Carb-Cholecalciferol (CALCIUM 600+D3 PO) Take by mouth 2 times daily      Multiple Vitamins-Minerals (MULTIVITAMIN PO) Take 2 tablets by mouth every morning        Current Facility-Administered Medications   Medication Dose Route Frequency Provider Last Rate Last Admin    methylPREDNISolone acetate (DEPO-MEDROL) injection 40 mg  40 mg Intra-articular Once Scooby Goodpasture, PA-C        lidocaine 1 % injection 4 mL  4 mL Intra-articular Once Scooby Goodpasture, PA-C        lidocaine 1 % injection 4 mL  4 mL Intradermal Once Scooby Goodpasture, PA-C           Allergies:    Levaquin [levofloxacin in d5w], Iodine, and Penicillins    Social History:   Social History     Socioeconomic History    Marital status:      Spouse name: None    Number of children: None    Years of education: None    Highest education level: None   Occupational History    None   Social Needs    Financial resource strain: None    Food insecurity     Worry: None     Inability: None    Transportation needs     Medical: None     Non-medical: None   Tobacco Use    Smoking status: Former Smoker     Packs/day: 0.50     Years: 6.00     Pack years: 3.00     Types: Cigarettes     Quit date: 1960     Years since quittin.7    Smokeless tobacco: Never Used    Tobacco comment: quit 35-40 years ago   Substance and Sexual Activity    Alcohol use: No     Alcohol/week: 0.0 standard drinks    Drug use: No    Sexual activity: None   Lifestyle    Physical activity     Days per week: None     Minutes per session: None    Stress: None   Relationships    Social connections     Talks on phone: None     Gets together: None     Attends Buddhism service: None     Active member of club or organization: None     Attends meetings of clubs or organizations: None     Relationship status: None    Intimate partner violence     Fear of current or ex partner: None     Emotionally abused: None     Physically abused: None     Forced sexual activity: None   Other Topics Concern    None   Social History Narrative    None       Family History:  Family History Problem Relation Age of Onset    Other Mother         Knee pain    No Known Problems Father        Vitals:   Pulse 78   Temp 97.3 °F (36.3 °C)   Resp 12   Ht 5' 5\" (1.651 m)   Wt 153 lb (69.4 kg)   LMP 08/15/1983 (Exact Date)   SpO2 98%   BMI 25.46 kg/m²  Body mass index is 25.46 kg/m². Physical Examination:     Orthopedics:    GENERAL: Alert and oriented X3 in no acute distress. SKIN: Intact without lesions or ulcerations. NEURO: Intact to sensory and motor testing. VASC: Capillary refill is less than 3 seconds. KNEE EXAM    LOCATION: Left Knee  GEN: Alert and oriented X 3, in no acute distress. GAIT: The patient's gait was observed while entering the exam room and was noted to be  antalgic. The extremity is in anatomic alignment. SKIN: Intact without rashes, lesions, or ulcerations. No obvious deformity or swelling. NEURO: The patient responds to light touch throughout bilateral LE. Patellar and Achilles reflexes are 2/4. VASC: The bilateral LE is neurovascularly intact with 2/4 DP and 2/4 PT pulses. Brisk capillary refill. ROM: 0/116 degrees. There is moderate effusion. MUSC: decreased quad tone  LIGAMENT: There is No varus instability at 0 degrees and No varus instability at 30 degrees. There is No valgus instability at 0 degrees and No valgus instability at 30 degrees. SPECIAL: Yo test is negative with no clunks, + crepitation, and no pain. PALP: There is medial and lateral joint line pain. Assessment:     1. Primary osteoarthritis of left knee      Procedures:    Procedure: yes    Joint aspiration of the left knee. The patient was placed in the supine position on the exam table. The superior lateral portal was identified and marked. The skin was prepped with betadine in a sterile fashion. Utilizing ultrasound for precise placement and clean technique with sterile gloves a 5cc solution containing 5cc of 1.0% Lidocaine was injected. There was no resistance to the injection. Thereafter the skin was prepped with betadine in a sterile fashion once again and the joint was aspirated with a 60cc syringe. After aspirating the joint, 22 cc's of yellow tinged synovial fluid was removed from the knee. The wound was then cleansed and a band-aid was placed over the superior lateral portal site. The patient tolerated the procedure without difficulty. Adverse reactions to the aspiration were discussed with the patient including signs of infection (increasing pain, redness, swelling) and the patient was instructed to call immediately if experiencing any of these symptoms. Regular Knee Injection    Location: Left knee  Procedure: Corticosteroid injection into the knee. the patient was placed in the supine position on the exam table. The superior lateral portal was identified and marked. the skin was prepped with betadine in a sterile fashion. Utilizing [employer] ultrasound unit with a variable frequency linear transducer was used for precise placement and clean technique with sterile gloves a 5cc solution containing 4cc of 1.0% Lidocaine with 1cc containing 40mg of Depo-medrol was injected. There was no resistance to the injection. The wound was cleansed and a band-aid was placed. the patient tolerated the procedure without difficulty. Adverse reactions to the injection were discussed with the patient including signs of infection (increasing pain, redness, swelling) and the patient was instructed to call immediately if experiencing any of these symptoms. Radiology:   No results found. Plan:   Treatment : I reviewed the X-ray. We discussed the etiologies and natural histories of primary osteoarthritis of left knee. We discussed the various treatment alternatives including anti-inflammatory medications, physical therapy, injections, further imaging studies and as a last result surgery.  During today's visit, we discussed that she is ready to have a total knee replacement but does not want to do it

## 2021-03-10 ENCOUNTER — PROCEDURE VISIT (OUTPATIENT)
Dept: ORTHOPEDIC SURGERY | Age: 76
End: 2021-03-10
Payer: MEDICARE

## 2021-03-10 VITALS
TEMPERATURE: 97.3 F | HEART RATE: 74 BPM | BODY MASS INDEX: 25.99 KG/M2 | SYSTOLIC BLOOD PRESSURE: 120 MMHG | HEIGHT: 65 IN | DIASTOLIC BLOOD PRESSURE: 69 MMHG | WEIGHT: 156 LBS

## 2021-03-10 DIAGNOSIS — M17.12 PRIMARY OSTEOARTHRITIS OF LEFT KNEE: Primary | ICD-10-CM

## 2021-03-10 PROCEDURE — 20611 DRAIN/INJ JOINT/BURSA W/US: CPT | Performed by: PHYSICIAN ASSISTANT

## 2021-03-10 RX ORDER — HYALURONATE SODIUM 10 MG/ML
20 SYRINGE (ML) INTRAARTICULAR ONCE
Status: COMPLETED | OUTPATIENT
Start: 2021-03-10 | End: 2021-03-10

## 2021-03-10 RX ORDER — LIDOCAINE HYDROCHLORIDE 10 MG/ML
4 INJECTION, SOLUTION INFILTRATION; PERINEURAL ONCE
Status: COMPLETED | OUTPATIENT
Start: 2021-03-10 | End: 2021-03-10

## 2021-03-10 RX ADMIN — LIDOCAINE HYDROCHLORIDE 4 ML: 10 INJECTION, SOLUTION INFILTRATION; PERINEURAL at 13:05

## 2021-03-10 RX ADMIN — Medication 20 MG: at 13:04

## 2021-03-10 NOTE — PROGRESS NOTES
superior portion of the patella and the lateral portion of the patella was identified and marked. The skin was prepped with betadine in a sterile fashion. Utilizing ultrasound for precise placement and sterile technique, 2 ml of Euflexxa was injected by the superior lateral approach. There was no resistance to injection. The wound was cleansed and a Band-Aid was placed. The patient tolerated the procedure without difficulty. Adverse reactions of the injection was discussed with the patient including signs of infection (increasing pain, redness, swelling) and the patient was instructed to call immediately with any of these symptoms. Plan:   Patient should return to the clinic in 1 week to follow up with Ulices Zepeda PA-C for another Euflexxa injection. Patient states that she had pain after the injection today and her pain is localized over the lateral aspect of the hamstring. The patient will call the office immediately with any problems. No orders of the defined types were placed in this encounter. No orders of the defined types were placed in this encounter. [facility], am scribing for and in the presence of  Ulices Zepeda PA-C. 3/10/2021  11:13 AM    IUlices PA-C, have personally seen this patient, reviewed the chart including history, and imaging if done. I personally  performed the physical exam and obtained any needed additional history. I placed orders, performed or supervised procedures and developed the treatment plan.     Electronically signed by Adali Noriega PA-C, on 3/10/2021 at 1:32 PM      Electronically signed by Mirela Campuzano on 3/10/2021 at 11:13 AM

## 2021-03-16 NOTE — PROGRESS NOTES
[address] AND SPORTS MEDICINE  [address] [place] [address]  Dept: [phone]  Dept Fax: [phone]          Left knee Visit - Follow up    Subjective:     Chief Complaint   Patient presents with    Knee Pain     Left knee Euflexxa #2     HPI:     Emma Johnson presents today for Left knee pain. Patient is here today for her second Euflexxa injection into the left knee. She had her last cortisone injection on 2/22/2021 with good pain relief. She has had a Hymovis lubrication injection on 03/14/2019 with good pain relief. She has had 4 episodes over the last week where her knee locked up. She says she pushes on the swelling on the back of her knee which seems to help. I have reviewed the CC, and if not present in this note, I have reviewed in the patient's chart. I agree with the documentation provided by other staff and have reviewed their documentation prior to providing my signature indicating agreement. Vitals:   /86   Pulse 69   Temp 97.2 °F (36.2 °C)   Ht 5' 5\" (1.651 m)   Wt 158 lb 12.8 oz (72 kg)   LMP 08/15/1983 (Exact Date)   BMI 26.43 kg/m²  Body mass index is 26.43 kg/m². Assessment:     1. Primary osteoarthritis of left knee        Procedure:   Procedure: Yes    Joint aspiration of the left knee. The patient was placed in the supine position on the exam table. The superior lateral portal was identified and marked. The skin was prepped with betadine in a sterile fashion. Utilizing ultrasound for precise placement and clean technique with sterile gloves a 5cc solution containing 5cc of 1.0% Lidocaine was injected. There was no resistance to the injection. Thereafter the skin was prepped with betadine in a sterile fashion once again and the joint was aspirated with a 60cc syringe. After aspirating the joint, 20 cc's of yellow tinged synovial fluid was removed from the knee.  The wound was then cleansed and a band-aid was placed over the superior lateral portal site. The patient tolerated the procedure without difficulty. Adverse reactions to the aspiration were discussed with the patient including signs of infection (increasing pain, redness, swelling) and the patient was instructed to call immediately if experiencing any of these symptoms. Euflexxa Injection    Location: Left knee  Procedure: Yovany Tamayo agreed for the Euflexxa injection into the left knee. The patient was placed in the supine position on the exam table. The junction of the superior portion of the patella and the lateral portion of the patella was identified and marked. The skin was prepped with betadine in a sterile fashion. Utilizing [employer] ultrasound unit with a variable frequency linear transducer for precise placement and sterile technique, 2 ml of Euflexxa was injected by the superior lateral approach. There was no resistance to injection. The wound was cleansed and a Band-Aid was placed. The patient tolerated the procedure without difficulty. Adverse reactions of the injection was discussed with the patient including signs of infection (increasing pain, redness, swelling) and the patient was instructed to call immediately with any of these symptoms.         Radiology:   KNEE X-RAY     4 views of the left knee including AP, bilateral tunnel, and lateral in the upright position, and skyline views reveal slight varus alignment with no fracture or dislocation. Kellgren grade IV changes of osteoarthritis (joint space narrowing, osteophyte, subchondral sclerosis, bony deformity/cyst) of the medial compartment(s). Osseous loose bodies adjacent to the medial tibial plateau. No bony erosion or periosteal reaction. No soft tissue masses.       Impression: Severe osteoarthritis of the left knee. Plan:   Patient should return to the clinic in 1 week to follow up with  Tan Coffey PA-C.   We did take new x-rays today and I went over them with the patient. Her arthritis is severe and the only thing is can I fix her knee eventually is a total knee replacement. The patient states she is not ready to do that at this time because she wants to get through so season. She plans on replacing her knee in the fall. She wants to continue the plan with finishing up with the Euflexxa injections to see if that helps and to help her knee produce less fluid. The patient will call the office immediately with any problems. Orders Placed This Encounter   Medications    sodium hyaluronate (EUFLEXXA, HYALGAN) injection 20 mg     Order Specific Question:   Which brand are you ordering? Answer:   Euflexxa    lidocaine 1 % injection 4 mL    Handicap Placard [facility]     Sig: by Does not apply route DISABLED PARKING PERMIT AUTHORIZATION  Routine     Qty-1    The patient has the following condition(s) which qualifies him/her for disabled parking: Walking severely limited due to orthopedic condition     Privilege Duration: Temporary for 3 months     Dispense:  1 each     Refill:  0       No orders of the defined types were placed in this encounter. Jeff Cross PA-C, have personally seen this patient, reviewed the chart including history, and imaging if done. I personally  performed the physical exam and obtained any needed additional history. I placed orders, performed or supervised procedures and developed the treatment plan.     Electronically signed by Lani Conklin PA-C, on 3/17/2021 at 1:04 PM      Electronically signed by Lani Conklin PA-C, on 3/17/2021 at 12:57 PM

## 2021-03-17 ENCOUNTER — PROCEDURE VISIT (OUTPATIENT)
Dept: ORTHOPEDIC SURGERY | Age: 76
End: 2021-03-17
Payer: MEDICARE

## 2021-03-17 VITALS
HEART RATE: 69 BPM | HEIGHT: 65 IN | TEMPERATURE: 97.2 F | DIASTOLIC BLOOD PRESSURE: 86 MMHG | SYSTOLIC BLOOD PRESSURE: 136 MMHG | BODY MASS INDEX: 26.46 KG/M2 | WEIGHT: 158.8 LBS

## 2021-03-17 DIAGNOSIS — M17.12 PRIMARY OSTEOARTHRITIS OF LEFT KNEE: Primary | ICD-10-CM

## 2021-03-17 PROCEDURE — 20611 DRAIN/INJ JOINT/BURSA W/US: CPT | Performed by: PHYSICIAN ASSISTANT

## 2021-03-17 RX ORDER — HYALURONATE SODIUM 10 MG/ML
20 SYRINGE (ML) INTRAARTICULAR ONCE
Status: COMPLETED | OUTPATIENT
Start: 2021-03-17 | End: 2021-03-17

## 2021-03-17 RX ORDER — LIDOCAINE HYDROCHLORIDE 10 MG/ML
4 INJECTION, SOLUTION INFILTRATION; PERINEURAL ONCE
Status: COMPLETED | OUTPATIENT
Start: 2021-03-17 | End: 2021-03-17

## 2021-03-17 RX ADMIN — LIDOCAINE HYDROCHLORIDE 4 ML: 10 INJECTION, SOLUTION INFILTRATION; PERINEURAL at 15:02

## 2021-03-17 RX ADMIN — Medication 20 MG: at 15:01

## 2021-03-24 ENCOUNTER — PROCEDURE VISIT (OUTPATIENT)
Dept: ORTHOPEDIC SURGERY | Age: 76
End: 2021-03-24
Payer: MEDICARE

## 2021-03-24 VITALS
HEIGHT: 65 IN | DIASTOLIC BLOOD PRESSURE: 71 MMHG | BODY MASS INDEX: 26.16 KG/M2 | HEART RATE: 68 BPM | SYSTOLIC BLOOD PRESSURE: 137 MMHG | WEIGHT: 157 LBS | TEMPERATURE: 96.8 F | RESPIRATION RATE: 13 BRPM

## 2021-03-24 DIAGNOSIS — M17.12 PRIMARY OSTEOARTHRITIS OF LEFT KNEE: Primary | ICD-10-CM

## 2021-03-24 PROCEDURE — 20611 DRAIN/INJ JOINT/BURSA W/US: CPT | Performed by: PHYSICIAN ASSISTANT

## 2021-03-24 RX ORDER — LIDOCAINE HYDROCHLORIDE 10 MG/ML
4 INJECTION, SOLUTION INFILTRATION; PERINEURAL ONCE
Status: COMPLETED | OUTPATIENT
Start: 2021-03-24 | End: 2021-03-24

## 2021-03-24 RX ORDER — HYALURONATE SODIUM 10 MG/ML
20 SYRINGE (ML) INTRAARTICULAR ONCE
Status: COMPLETED | OUTPATIENT
Start: 2021-03-24 | End: 2021-03-24

## 2021-03-24 RX ORDER — LIDOCAINE HYDROCHLORIDE 10 MG/ML
4 INJECTION, SOLUTION INFILTRATION; PERINEURAL ONCE
Status: CANCELLED | OUTPATIENT
Start: 2021-03-24 | End: 2021-03-24

## 2021-03-24 RX ADMIN — LIDOCAINE HYDROCHLORIDE 4 ML: 10 INJECTION, SOLUTION INFILTRATION; PERINEURAL at 11:10

## 2021-03-24 RX ADMIN — Medication 20 MG: at 11:11

## 2021-03-24 NOTE — PROGRESS NOTES
[facility] [address] AND [facility]TS MEDICINE  [address] [place] [id number]  Dept: [phone]  Dept Fax: [phone]          Left Knee Visit - Follow up    Subjective:     Chief Complaint   Patient presents with    Procedure     Patient is here for Left Knee Euflexxa Injection #3     HPI:     Precilla Port presents today for Left knee pain. Patient is here today for Euflexxa injection into Left knee. She had her last cortisone injection on 2/22/2021 with good pain relief. She has had her second Euflexxa lubrication injection on 03/17/2021. I have reviewed the CC, and if not present in this note, I have reviewed in the patient's chart. I agree with the documentation provided by other staff and have reviewed their documentation prior to providing my signature indicating agreement. Vitals:   /71   Pulse 68   Temp 96.8 °F (36 °C)   Resp 13   Ht 5' 5\" (1.651 m)   Wt 157 lb (71.2 kg)   LMP 08/15/1983 (Exact Date)   BMI 26.13 kg/m²  Body mass index is 26.13 kg/m². Assessment:     1. Primary osteoarthritis of left knee      Procedure:   Procedure: Yes    Arthrocentesis    Location: Left Knee   Procedure: After consent was obtained, using sterile technique the Left knee was prepped and plain Lidocaine 1% was used as local anesthetic. The joint was entered and 14 cc's of yellow colored fluid was withdrawn. The procedure was well tolerated. The patient is asked to continue to rest the joint for a few more days before resuming regular activities. It may be more painful for the first 1-2 days. Watch for fever, or increased swelling or persistent pain in the joint. Call or return to clinic prn if such symptoms occur or there is failure to improve as anticipated.     Euflexxa Injection    Location: Left Knee  Procedure: I discussed in detail the risks, benefits and complications of the Euflexxa injection which included but are not limited to infection, skin reactions, hot swollen joint and anaphylaxis with the patient. Astrid Leon agreed for the Euflexxa injection into the left knee and gave consent to do the injection. The patient was placed in the supine position on the exam table. The junction of the superior portion of the patella and the lateral portion of the patella was identified and marked. The skin was prepped with betadine in a sterile fashion. Utilizing sterile technique, a [employer] ultrasound unit with a variable frequency linear transducer was used for precise placement and a 22 gauge needle was used to inject the the knee joint with 2 ml of Euflexxa through the superior lateral approach. There was no resistance to injection. The wound was cleansed and a Band-Aid was placed. The patient tolerated the procedure well without difficulty. Adverse reactions of the injection was discussed with the patient including signs of infection, fever, chills, warmth, increasing pain, redness and swelling. The patient was instructed to call the office immediately if they are experiencing any of these symptoms. Images of the injection site were recorded throughout the procedure and are saved on the SD card which is stored in the GE ultrasound unit. All images were downloaded and stored in patient's chart. Plan:   Patient should return to the clinic in 6 weeks to follow up with Anna Bruno PA-C for a cortisone injection if needed. The patient will call the office immediately with any problems. Orders Placed This Encounter   Medications    sodium hyaluronate (EUFLEXXA, HYALGAN) injection 20 mg     Order Specific Question:   Which brand are you ordering? Answer:   Euflexxa    lidocaine 1 % injection 4 mL     No orders of the defined types were placed in this encounter.     IInad[facility] V, am scribing for and in the presence of  Anna Bruno PA-C. 3/24/2021  11:11 AM    IAnna PA-C, have personally seen this patient, reviewed the chart including history,

## 2021-05-23 DIAGNOSIS — E03.9 ACQUIRED HYPOTHYROIDISM: ICD-10-CM

## 2021-05-24 RX ORDER — LEVOTHYROXINE SODIUM 0.05 MG/1
TABLET ORAL
Qty: 90 TABLET | Refills: 3 | Status: SHIPPED | OUTPATIENT
Start: 2021-05-24 | End: 2022-05-19

## 2021-06-01 RX ORDER — AMLODIPINE BESYLATE 10 MG/1
TABLET ORAL
Qty: 90 TABLET | Refills: 3 | Status: SHIPPED | OUTPATIENT
Start: 2021-06-01 | End: 2022-05-26

## 2021-07-14 ENCOUNTER — HOSPITAL ENCOUNTER (OUTPATIENT)
Dept: LAB | Age: 76
Discharge: HOME | End: 2021-07-14
Payer: MEDICARE

## 2021-07-14 DIAGNOSIS — E78.49 OTHER HYPERLIPIDEMIA: ICD-10-CM

## 2021-07-14 DIAGNOSIS — I10 ESSENTIAL HYPERTENSION: ICD-10-CM

## 2021-07-14 DIAGNOSIS — E03.9 HYPOTHYROIDISM (ACQUIRED): ICD-10-CM

## 2021-07-14 LAB
ALBUMIN SERPL-MCNC: 4.5 G/DL (ref 3.5–5.2)
ALBUMIN/GLOBULIN RATIO: 1.6 (ref 1–2.5)
ALP BLD-CCNC: 107 U/L (ref 35–104)
ALT SERPL-CCNC: 28 U/L (ref 5–33)
ANION GAP SERPL CALCULATED.3IONS-SCNC: 6 MMOL/L (ref 9–17)
AST SERPL-CCNC: 22 U/L
BILIRUB SERPL-MCNC: 0.4 MG/DL (ref 0.3–1.2)
BUN BLDV-MCNC: 23 MG/DL (ref 8–23)
BUN/CREAT BLD: 29 (ref 9–20)
CALCIUM SERPL-MCNC: 9.6 MG/DL (ref 8.6–10.4)
CHLORIDE BLD-SCNC: 103 MMOL/L (ref 98–107)
CHOLESTEROL/HDL RATIO: 2.8
CHOLESTEROL: 189 MG/DL
CO2: 32 MMOL/L (ref 20–31)
CREAT SERPL-MCNC: 0.78 MG/DL (ref 0.5–0.9)
GFR AFRICAN AMERICAN: >60 ML/MIN
GFR NON-AFRICAN AMERICAN: >60 ML/MIN
GFR SERPL CREATININE-BSD FRML MDRD: ABNORMAL ML/MIN/{1.73_M2}
GFR SERPL CREATININE-BSD FRML MDRD: ABNORMAL ML/MIN/{1.73_M2}
GLUCOSE FASTING: 96 MG/DL (ref 70–99)
HDLC SERPL-MCNC: 68 MG/DL
LDL CHOLESTEROL: 104 MG/DL (ref 0–130)
POTASSIUM SERPL-SCNC: 3.8 MMOL/L (ref 3.7–5.3)
SODIUM BLD-SCNC: 141 MMOL/L (ref 135–144)
THYROXINE, FREE: 1.21 NG/DL (ref 0.93–1.7)
TOTAL PROTEIN: 7.3 G/DL (ref 6.4–8.3)
TRIGL SERPL-MCNC: 83 MG/DL
TSH SERPL DL<=0.05 MIU/L-ACNC: 2.24 MIU/L (ref 0.3–5)
VLDLC SERPL CALC-MCNC: NORMAL MG/DL (ref 1–30)

## 2021-07-14 PROCEDURE — 80061 LIPID PANEL: CPT

## 2021-07-14 PROCEDURE — 36415 COLL VENOUS BLD VENIPUNCTURE: CPT

## 2021-07-14 PROCEDURE — 84439 ASSAY OF FREE THYROXINE: CPT

## 2021-07-14 PROCEDURE — 84443 ASSAY THYROID STIM HORMONE: CPT

## 2021-07-14 PROCEDURE — 80053 COMPREHEN METABOLIC PANEL: CPT

## 2021-07-15 ENCOUNTER — OFFICE VISIT (OUTPATIENT)
Dept: INTERNAL MEDICINE | Age: 76
End: 2021-07-15
Payer: MEDICARE

## 2021-07-15 VITALS
BODY MASS INDEX: 23.69 KG/M2 | HEIGHT: 65 IN | SYSTOLIC BLOOD PRESSURE: 120 MMHG | HEART RATE: 81 BPM | WEIGHT: 142.2 LBS | OXYGEN SATURATION: 97 % | RESPIRATION RATE: 16 BRPM | DIASTOLIC BLOOD PRESSURE: 76 MMHG

## 2021-07-15 DIAGNOSIS — E78.49 OTHER HYPERLIPIDEMIA: ICD-10-CM

## 2021-07-15 DIAGNOSIS — Z00.00 ROUTINE GENERAL MEDICAL EXAMINATION AT A HEALTH CARE FACILITY: Primary | ICD-10-CM

## 2021-07-15 DIAGNOSIS — G89.29 CHRONIC PAIN OF LEFT KNEE: ICD-10-CM

## 2021-07-15 DIAGNOSIS — J47.0 BRONCHIECTASIS WITH ACUTE LOWER RESPIRATORY INFECTION (HCC): ICD-10-CM

## 2021-07-15 DIAGNOSIS — I10 ESSENTIAL HYPERTENSION: ICD-10-CM

## 2021-07-15 DIAGNOSIS — M25.562 CHRONIC PAIN OF LEFT KNEE: ICD-10-CM

## 2021-07-15 DIAGNOSIS — Z00.00 MEDICARE ANNUAL WELLNESS VISIT, SUBSEQUENT: ICD-10-CM

## 2021-07-15 PROCEDURE — 99212 OFFICE O/P EST SF 10 MIN: CPT | Performed by: INTERNAL MEDICINE

## 2021-07-15 PROCEDURE — 99214 OFFICE O/P EST MOD 30 MIN: CPT | Performed by: INTERNAL MEDICINE

## 2021-07-15 PROCEDURE — G0439 PPPS, SUBSEQ VISIT: HCPCS | Performed by: INTERNAL MEDICINE

## 2021-07-15 ASSESSMENT — ENCOUNTER SYMPTOMS
ABDOMINAL PAIN: 0
DIARRHEA: 0
EYE PAIN: 0
CONSTIPATION: 0
SHORTNESS OF BREATH: 0
COUGH: 0
NAUSEA: 0
VOMITING: 0
BLOOD IN STOOL: 0
BACK PAIN: 0

## 2021-07-15 ASSESSMENT — PATIENT HEALTH QUESTIONNAIRE - PHQ9
SUM OF ALL RESPONSES TO PHQ9 QUESTIONS 1 & 2: 0
1. LITTLE INTEREST OR PLEASURE IN DOING THINGS: 0
2. FEELING DOWN, DEPRESSED OR HOPELESS: 0
SUM OF ALL RESPONSES TO PHQ QUESTIONS 1-9: 0

## 2021-07-15 ASSESSMENT — LIFESTYLE VARIABLES: HOW OFTEN DO YOU HAVE A DRINK CONTAINING ALCOHOL: 0

## 2021-07-15 NOTE — PATIENT INSTRUCTIONS
Personalized Preventive Plan for Cem Meraz - 7/15/2021  Medicare offers a range of preventive health benefits. Some of the tests and screenings are paid in full while other may be subject to a deductible, co-insurance, and/or copay. Some of these benefits include a comprehensive review of your medical history including lifestyle, illnesses that may run in your family, and various assessments and screenings as appropriate. After reviewing your medical record and screening and assessments performed today your provider may have ordered immunizations, labs, imaging, and/or referrals for you. A list of these orders (if applicable) as well as your Preventive Care list are included within your After Visit Summary for your review. Other Preventive Recommendations:    · A preventive eye exam performed by an eye specialist is recommended every 1-2 years to screen for glaucoma; cataracts, macular degeneration, and other eye disorders. · A preventive dental visit is recommended every 6 months. · Try to get at least 150 minutes of exercise per week or 10,000 steps per day on a pedometer . · Order or download the FREE \"Exercise & Physical Activity: Your Everyday Guide\" from [employer]. Call 3-518.482.7881 or search [employer] online. · You need 6888-6057 mg of calcium and 3431-0065 IU of vitamin D per day. It is possible to meet your calcium requirement with diet alone, but a vitamin D supplement is usually necessary to meet this goal.  · When exposed to the sun, use a sunscreen that protects against both UVA and UVB radiation with an SPF of 30 or greater. Reapply every 2 to 3 hours or after sweating, drying off with a towel, or swimming. · Always wear a seat belt when traveling in a car. Always wear a helmet when riding a bicycle or motorcycle.

## 2021-07-15 NOTE — PROGRESS NOTES
[place] [address]  Dept: [phone]  Dept Fax: [phone]  Loc: [phone]     Fredy Pineda is a 64 y.o. female who presents today for her medical conditions/complaintsas noted below. Fredy Pineda is c/o of   Chief Complaint   Patient presents with    Medicare AWV     6 month     Hypertension    Hyperlipidemia         HPI:     Hypertension  This is a chronic (esential htn) problem. The current episode started more than 1 year ago. The problem has been waxing and waning since onset. The problem is controlled. Pertinent negatives include no chest pain, headaches, neck pain, palpitations or shortness of breath. Hyperlipidemia  This is a chronic problem. The current episode started more than 1 year ago. The problem is controlled. Recent lipid tests were reviewed and are variable. Pertinent negatives include no chest pain or shortness of breath. Other  This is a recurrent (3-General medical exam,  4-bronchiectasis, previously associated with acute lower respiratory infection which is now resolved) problem. The current episode started today. The problem occurs intermittently. The problem has been waxing and waning. Pertinent negatives include no abdominal pain, arthralgias, chest pain, chills, coughing, fever, headaches, nausea, neck pain, numbness, rash, vomiting or weakness. Knee Pain   The incident occurred more than 1 week ago (4-chronic left knee pain). The incident occurred at home. There was no injury mechanism. The pain is present in the left knee. The pain is mild. The pain has been fluctuating since onset. Pertinent negatives include no numbness.        No results found for: LABA1C         No results found for: LABMICR  LDL Cholesterol (mg/dL)   Date Value   07/14/2021 104   07/15/2020 82   07/26/2019 93         AST (U/L)   Date Value   07/14/2021 22     ALT (U/L)   Date Value 2021 28     BUN (mg/dL)   Date Value   2021 23     BP Readings from Last 3 Encounters:   07/15/21 120/76   21 137/71   21 136/86              Past Medical History:   Diagnosis Date    Bacillus fragilis infection     treated one year Dr Perry Zhang   [facility] Bronchiectasis ([address]     Chronic lower back pain 2016    Chronic radicular lumbar pain 10/2/2015    Degenerative disc disease     Hyperlipidemia     Hyperreflexic bladder     Hypertension     Hypothyroidism     Primary osteoarthritis of right knee 2017      Past Surgical History:   Procedure Laterality Date    BREAST SURGERY Left     core biopsy    BRONCHOSCOPY Left 2012    COLONOSCOPY      SIS 8 y   [address]    and BSO    OTHER SURGICAL HISTORY Right 11    right L4 Transforaminal epidural steroid injection    OTHER SURGICAL HISTORY Right 10/2/15    right L4 TFE    NE TOTAL KNEE ARTHROPLASTY Right 2017    KNEE TOTAL ARTHROPLASTY RIGHT WITH WOO & NEPHEW  AQUAMANTIS   PLATELET GEL performed by Nikki Libman, DO at [address] [place] Right 2017       Family History   Problem Relation Age of Onset    Other Mother         Knee pain    No Known Problems Father           Social History     Tobacco Use    Smoking status: Former Smoker     Packs/day: 0.50     Years: 6.00     Pack years: 3.00     Types: Cigarettes     Quit date: 1960     Years since quittin.1    Smokeless tobacco: Never Used    Tobacco comment: quit 35-40 years ago   Substance Use Topics    Alcohol use: No     Alcohol/week: 0.0 standard drinks         Current Outpatient Medications   Medication Sig Dispense Refill    amLODIPine (NORVASC) 10 MG tablet TAKE 1 TABLET DAILY 90 tablet 3    levothyroxine (SYNTHROID) 50 MCG tablet TAKE 1 TABLET DAILY 90 tablet 3    Handicap Placard MISC by Does not apply route DISABLED PARKING PERMIT AUTHORIZATION  Routine     Qty-1    The patient has the following condition(s) which qualifies him/her for disabled parking: Walking severely limited due to orthopedic condition     Privilege Duration: Temporary for 3 months 1 each 0    oxybutynin (DITROPAN-XL) 10 MG extended release tablet TAKE 1 TABLET DAILY 90 tablet 3    atorvastatin (LIPITOR) 40 MG tablet TAKE 1 TABLET DAILY 90 tablet 3    metoprolol tartrate (LOPRESSOR) 25 MG tablet TAKE 1 TABLET TWICE A  tablet 3    furosemide (LASIX) 20 MG tablet Take 1 tablet by mouth daily 30 tablet 0    Methylsulfonylmethane (MSM PO) Take by mouth daily      Nutritional Supplements (SILICA) 88.0 MG CAPS Take by mouth daily      Calcium Carb-Cholecalciferol (CALCIUM 600+D3 PO) Take by mouth 2 times daily      Multiple Vitamins-Minerals (MULTIVITAMIN PO) Take 2 tablets by mouth every morning        Current Facility-Administered Medications   Medication Dose Route Frequency Provider Last Rate Last Admin    lidocaine 1 % injection 4 mL  4 mL Intradermal Once Evelena Model, PA-C         Allergies   Allergen Reactions    Levaquin [Levofloxacin In D5w] Other (See Comments)     Attacked muscles and tendons    Iodine Other (See Comments)     Cantu       Penicillins Other (See Comments)     abcess at site of injection       Health Maintenance   Topic Date Due    Hepatitis C screen  Never done    DTaP/Tdap/Td vaccine (1 - Tdap) Never done    Annual Wellness Visit (AWV)  Never done    Shingles Vaccine (3 of 3) 12/15/2020    Flu vaccine (1) 09/01/2021    Lipid screen  07/14/2022    TSH testing  07/14/2022    Potassium monitoring  07/14/2022    Creatinine monitoring  07/14/2022    Pneumococcal 65+ years Vaccine  Completed    COVID-19 Vaccine  Completed    Hepatitis A vaccine  Aged Out    Hepatitis B vaccine  Aged Out    Hib vaccine  Aged Out    Meningococcal (ACWY) vaccine  Aged Out       Subjective:      Review of Systems   Constitutional: Negative for chills and fever.    HENT: Negative for hearing loss. Eyes: Negative for pain and visual disturbance. Respiratory: Negative for cough and shortness of breath. Cardiovascular: Negative for chest pain, palpitations and leg swelling. Gastrointestinal: Negative for abdominal pain, blood in stool, constipation, diarrhea, nausea and vomiting. Endocrine: Negative for cold intolerance, polydipsia and polyuria. Genitourinary: Negative for difficulty urinating, dysuria and hematuria. Musculoskeletal: Negative for arthralgias, back pain, gait problem and neck pain. Skin: Negative for pallor and rash. Neurological: Negative for dizziness, weakness, numbness and headaches. Hematological: Negative for adenopathy. Does not bruise/bleed easily. Psychiatric/Behavioral: Negative for confusion. The patient is not nervous/anxious. Objective:     Physical Exam  Vitals reviewed. Constitutional:       Appearance: She is well-developed. HENT:      Head: Normocephalic and atraumatic. Eyes:      Pupils: Pupils are equal, round, and reactive to light. Cardiovascular:      Rate and Rhythm: Normal rate and regular rhythm. Heart sounds: No murmur heard. No friction rub. No gallop. Pulmonary:      Effort: Pulmonary effort is normal.      Breath sounds: Normal breath sounds. No wheezing or rales. Abdominal:      General: There is no distension. Palpations: Abdomen is soft. There is no mass. Tenderness: There is no abdominal tenderness. There is no rebound. Musculoskeletal:         General: Normal range of motion. Cervical back: Neck supple. Lymphadenopathy:      Cervical: No cervical adenopathy. Skin:     General: Skin is warm and dry. Findings: No rash. Neurological:      Mental Status: She is alert and oriented to person, place, and time. Cranial Nerves: No cranial nerve deficit (grossly). Psychiatric:         Thought Content:  Thought content normal.        /76 (Site: Left Upper Arm, Position: Sitting, Cuff Size: Medium Adult)   Pulse 81   Resp 16   Ht 5' 5\" (1.651 m)   Wt 142 lb 3.2 oz (64.5 kg)   LMP 08/15/1983 (Exact Date)   SpO2 97%   BMI 23.66 kg/m²     Assessment:       Diagnosis Orders   1. Routine general medical examination at a health care facility     2. Essential hypertension     3. Bronchiectasis with acute lower respiratory infection ([address]     4. Other hyperlipidemia     5. Chronic pain of left knee     6. Medicare annual wellness visit, subsequent         I reviewed multiple test results for this appointment. 7/14/2021 normal glucose at 96.    7/14/2020 normal total cholesterol at 189.    7/14/2020 normal TSH at 2.24. Patient told to continue Lipitor and Synthroid. Plan:       Return in about 6 months (around 1/17/2022) for Hypertension, Hyperlipidemia. No orders of the defined types were placed in this encounter. No orders of the defined types were placed in this encounter. Patientgiven educational materials - see patient instructions. Discussed use, benefit,and side effects of prescribed medications. All patient questions answered. Ptvoiced understanding. Reviewed health maintenance. Instructed to continue currentmedications, diet and exercise. Patient agreed with treatment plan. Follow up asdirected.      Electronically signed by Allen Croft MD on 7/15/2021 at 11:06 AM

## 2021-07-15 NOTE — PROGRESS NOTES
Medicare Annual Wellness Visit  Name: Rom Tobar Date: 7/15/2021   MRN: [id number] Sex: Female   Age: 64 y.o. Ethnicity: Non-/Non    : 1945 Race: Nelson Case is here for Medicare AWV (6 month ), Hypertension, and Hyperlipidemia    Screenings for behavioral, psychosocial and functional/safety risks, and cognitive dysfunction are all negative except as indicated below. These results, as well as other patient data from the [address] form, are documented in Flowsheets linked to this Encounter. Allergies   Allergen Reactions    Levaquin [Levofloxacin In D5w] Other (See Comments)     Attacked muscles and tendons    Iodine Other (See Comments)     Cantu       Penicillins Other (See Comments)     abcess at site of injection         Prior to Visit Medications    Medication Sig Taking?  Authorizing Provider   amLODIPine (NORVASC) 10 MG tablet TAKE 1 TABLET DAILY Yes Monty Manuel DO   levothyroxine (SYNTHROID) 50 MCG tablet TAKE 1 TABLET DAILY Yes Angel Liao MD   Handicap Placard MISC by Does not apply route DISABLED PARKING PERMIT AUTHORIZATION  Routine     Qty-1    The patient has the following condition(s) which qualifies him/her for disabled parking: Walking severely limited due to orthopedic condition     Privilege Duration: Temporary for 3 months Yes Isidro Holcomb PA-C   oxybutynin (DITROPAN-XL) 10 MG extended release tablet TAKE 1 TABLET DAILY Yes Angel Liao MD   atorvastatin (LIPITOR) 40 MG tablet TAKE 1 TABLET DAILY Yes Angel Liao MD   metoprolol tartrate (LOPRESSOR) 25 MG tablet TAKE 1 TABLET TWICE A DAY Yes Angel Liao MD   furosemide (LASIX) 20 MG tablet Take 1 tablet by mouth daily Yes Angel Liao MD   Methylsulfonylmethane (MSM PO) Take by mouth daily Yes Historical Provider, MD   Nutritional Supplements (SILICA) 10.9 MG CAPS Take by mouth daily Yes Historical Provider, MD   Calcium Carb-Cholecalciferol (CALCIUM 600+D3 PO) Take by mouth 2 times daily Yes Historical Provider, MD   Multiple Vitamins-Minerals (MULTIVITAMIN PO) Take 2 tablets by mouth every morning  Yes Historical Provider, MD         Past Medical History:   Diagnosis Date    Bacillus fragilis infection     treated one year Dr Jean Paul Fowler    [facility]chiectasis [facility])     Chronic lower back pain 2/5/2016    Chronic radicular lumbar pain 10/2/2015    Degenerative disc disease     Hyperlipidemia     Hyperreflexic bladder     Hypertension     Hypothyroidism     Primary osteoarthritis of right knee 12/20/2017       Past Surgical History:   Procedure Laterality Date    BREAST SURGERY Left 2012    core biopsy    BRONCHOSCOPY Left 6/19/2012    COLONOSCOPY  9/13    SIS 10 y   [address]    and BSO    OTHER SURGICAL HISTORY Right 4/21/11    right L4 Transforaminal epidural steroid injection    OTHER SURGICAL HISTORY Right 10/2/15    right L4 TFE    OK TOTAL KNEE ARTHROPLASTY Right 12/19/2017    KNEE TOTAL ARTHROPLASTY RIGHT WITH WOO & NEPHEW  AQUAMANTIS   PLATELET GEL performed by Giovanni Ferreira DO at [place] [id number] Right 12/19/2017         Family History   Problem Relation Age of Onset    Other Mother         Knee pain    No Known Problems Father        CareTeam (Including outside providers/suppliers regularly involved in providing care):   Patient Care Team:  Jessica Schwarz MD as PCP - General (Internal Medicine)  Jessica Schwarz MD as PCP - [facility] Empaneled Provider  Heather Barragan MD as Consulting Physician (Pulmonology)  Valdo Figueroa MD (Physical Medicine and Rehab)  Audra Schumacher MD as Surgeon (Orthopedic Surgery)  Katya Sherwood DO as Referring Physician (Sports Medicine)    Wt Readings from Last 3 Encounters:   07/15/21 142 lb 3.2 oz (64.5 kg)   03/24/21 157 lb (71.2 kg)   03/17/21 158 lb 12.8 oz (72 kg)     Vitals:    07/15/21 1049   BP: 120/76   Site: Left Upper Arm   Position: Sitting   Cuff Size: Medium Adult   Pulse: 81   Resp: 16   SpO2: 97%   Weight: 142 lb 3.2 oz (64.5 kg)   Height: 5' 5\" (1.651 m)     Body mass index is 23.66 kg/m². Patient's complete Health Risk Assessment and screening values have been reviewed and are found in Flowsheets. The following problems were reviewed today and where indicated follow up appointments were made and/or referrals ordered. Positive Risk Factor Screenings with Interventions:            General Health and ACP:  General  In general, how would you say your health is?: Very Good  In the past 7 days, have you experienced any of the following? New or Increased Pain, New or Increased Fatigue, Loneliness, Social Isolation, Stress or Anger?: None of These  Do you get the social and emotional support that you need?: Yes  Do you have a Living Will?: (!) No  Advance Directives     Power of [facility] Will ACP-Advance Directive ACP-Power of     Not on File Not on File Not on File Not on File      General Health Risk Interventions:  · No Living Will: Patient declines ACP discussion/assistance. She has info at home.       Safety:  Safety  Do you have working smoke detectors?: Yes  Have all throw rugs been removed or fastened?: (!) No  Do you have non-slip mats or surfaces in all bathtubs/showers?: Yes  Do all of your stairways have a railing or banister?: Yes  Are your doorways, halls and stairs free of clutter?: Yes  Do you always fasten your seatbelt when you are in a car?: Yes  Safety Interventions:  · Home safety tips provided     Personalized Preventive Plan   Current Health Maintenance Status  Immunization History   Administered Date(s) Administered    COVID-19, Moderna, PF, 100mcg/0.5mL 02/08/2021    Influenza Virus Vaccine 06/15/2012, 10/14/2013    Influenza, High Dose (Fluzone 65 yrs and older) 12/03/2014, 10/23/2015, 11/23/2016, 11/13/2017, 08/12/2020    Influenza, Triv, inactivated, subunit, adjuvanted, IM (Fluad 65 yrs and older) 10/16/2018, 12/09/2019    Pneumococcal Conjugate 13-valent ([id number]) 12/03/2014    Pneumococcal Conjugate 7-valent (Prevnar7) 12/01/2007    Pneumococcal Polysaccharide ([id number]) 08/12/2020    Zoster Live (Zostavax) 09/06/2011, 08/12/2020, 10/12/2020    Zoster Recombinant (Shingrix) 08/12/2020, 10/20/2020        Health Maintenance   Topic Date Due    Hepatitis C screen  Never done    DTaP/Tdap/Td vaccine (1 - Tdap) Never done    Annual Wellness Visit (AWV)  Never done    Shingles Vaccine (3 of 3) 12/15/2020    Flu vaccine (1) 09/01/2021    Lipid screen  07/14/2022    TSH testing  07/14/2022    Potassium monitoring  07/14/2022    Creatinine monitoring  07/14/2022    Pneumococcal 65+ years Vaccine  Completed    COVID-19 Vaccine  Completed    Hepatitis A vaccine  Aged Out    Hepatitis B vaccine  Aged Out    Hib vaccine  Aged Out    Meningococcal (ACWY) vaccine  Aged Out     Recommendations for [employer] Due: see orders and patient instructions/AVS.  . Recommended screening schedule for the next 5-10 years is provided to the patient in written form: see Patient Instructions/AVS.    Ema Oliveros LPN, 0/30/3442, performed the documented evaluation under the direct supervision of the attending physician. I, Dr. Nohemi Ravi, directly supervised the performance of this Medicare annual wellness visit.

## 2021-07-27 DIAGNOSIS — I10 ESSENTIAL HYPERTENSION: ICD-10-CM

## 2021-07-27 RX ORDER — FUROSEMIDE 20 MG/1
TABLET ORAL
Qty: 90 TABLET | Refills: 3 | Status: SHIPPED | OUTPATIENT
Start: 2021-07-27 | End: 2022-07-25

## 2021-11-08 ENCOUNTER — TELEPHONE (OUTPATIENT)
Dept: INTERNAL MEDICINE | Age: 76
End: 2021-11-08

## 2021-11-08 RX ORDER — METHYLPREDNISOLONE 4 MG/1
TABLET ORAL
Qty: 1 KIT | Refills: 0 | Status: SHIPPED | OUTPATIENT
Start: 2021-11-08 | End: 2021-11-14

## 2021-11-08 NOTE — TELEPHONE ENCOUNTER
Have patient go to urgent care today to consider testing to rule out DVT, etc.    They  can prescribe medication, etc.

## 2021-11-08 NOTE — TELEPHONE ENCOUNTER
----- Message from Alannah Santo sent at 11/8/2021 11:02 AM EST -----  Subject: Message to Provider    QUESTIONS  Information for Provider? Patient called and said she is having left leg   pain. She said she believes its nerve pain. Would like help as to where   she should go and what she should do? Would also like some sort of pain   medication if possible. Please advise.  ---------------------------------------------------------------------------  --------------  CALL BACK INFO  What is the best way for the office to contact you? OK to leave message on   voicemail  Preferred Call Back Phone Number? [phone]  ---------------------------------------------------------------------------  --------------  SCRIPT ANSWERS  Relationship to Patient?  Self

## 2021-11-08 NOTE — TELEPHONE ENCOUNTER
Patient states its not in her lower leg, the pain is more coming from the sacral area then will move down her leg. She does have a lot of pain in the lower spine area. Is almost positive a nerve is being pinched.   She also stated that after she is sleeping all night she is unable to get up and walk in the morning that she is not able to really get up and walk/move until mid morning

## 2021-11-08 NOTE — TELEPHONE ENCOUNTER
She would like to try to start with the medrol dose edison first then if needed she will do PT.      Script pended

## 2021-11-09 ENCOUNTER — TELEPHONE (OUTPATIENT)
Dept: ORTHOPEDIC SURGERY | Age: 76
End: 2021-11-09

## 2021-11-26 ENCOUNTER — HOSPITAL ENCOUNTER (OUTPATIENT)
Dept: MRI IMAGING | Age: 76
Discharge: HOME | End: 2021-11-28
Payer: MEDICARE

## 2021-11-26 DIAGNOSIS — M51.36 DEGENERATIVE DISC DISEASE, LUMBAR: ICD-10-CM

## 2021-11-26 PROCEDURE — 72148 MRI LUMBAR SPINE W/O DYE: CPT

## 2022-01-10 DIAGNOSIS — N32.89 SPASTIC BLADDER: ICD-10-CM

## 2022-01-10 DIAGNOSIS — I10 ESSENTIAL HYPERTENSION: ICD-10-CM

## 2022-01-10 RX ORDER — OXYBUTYNIN CHLORIDE 10 MG/1
TABLET, EXTENDED RELEASE ORAL
Qty: 90 TABLET | Refills: 3 | Status: SHIPPED | OUTPATIENT
Start: 2022-01-10

## 2022-02-09 DIAGNOSIS — E78.49 OTHER HYPERLIPIDEMIA: ICD-10-CM

## 2022-02-10 RX ORDER — ATORVASTATIN CALCIUM 40 MG/1
TABLET, FILM COATED ORAL
Qty: 90 TABLET | Refills: 3 | Status: SHIPPED | OUTPATIENT
Start: 2022-02-10

## 2022-02-25 ENCOUNTER — OFFICE VISIT (OUTPATIENT)
Dept: INTERNAL MEDICINE | Age: 77
End: 2022-02-25
Payer: MEDICARE

## 2022-02-25 VITALS
DIASTOLIC BLOOD PRESSURE: 60 MMHG | OXYGEN SATURATION: 98 % | HEART RATE: 78 BPM | RESPIRATION RATE: 16 BRPM | WEIGHT: 153.2 LBS | HEIGHT: 65 IN | BODY MASS INDEX: 25.52 KG/M2 | SYSTOLIC BLOOD PRESSURE: 130 MMHG

## 2022-02-25 DIAGNOSIS — M25.562 CHRONIC PAIN OF LEFT KNEE: ICD-10-CM

## 2022-02-25 DIAGNOSIS — G89.29 CHRONIC PAIN OF LEFT KNEE: ICD-10-CM

## 2022-02-25 DIAGNOSIS — E78.49 OTHER HYPERLIPIDEMIA: ICD-10-CM

## 2022-02-25 DIAGNOSIS — M25.511 ACUTE PAIN OF RIGHT SHOULDER: ICD-10-CM

## 2022-02-25 DIAGNOSIS — E03.9 HYPOTHYROIDISM (ACQUIRED): ICD-10-CM

## 2022-02-25 DIAGNOSIS — I10 PRIMARY HYPERTENSION: Primary | ICD-10-CM

## 2022-02-25 PROCEDURE — 99214 OFFICE O/P EST MOD 30 MIN: CPT | Performed by: INTERNAL MEDICINE

## 2022-02-25 RX ORDER — METHYLPREDNISOLONE 4 MG/1
TABLET ORAL
Qty: 1 KIT | Refills: 0 | Status: CANCELLED | OUTPATIENT
Start: 2022-02-25 | End: 2022-03-03

## 2022-02-25 RX ORDER — ACETAMINOPHEN AND CODEINE PHOSPHATE 300; 30 MG/1; MG/1
1 TABLET ORAL EVERY 4 HOURS PRN
COMMUNITY
End: 2022-08-30

## 2022-02-25 RX ORDER — METHYLPREDNISOLONE 4 MG/1
TABLET ORAL
Qty: 1 KIT | Refills: 0 | Status: SHIPPED | OUTPATIENT
Start: 2022-02-25 | End: 2022-03-03

## 2022-02-25 RX ORDER — ETODOLAC 400 MG/1
400 TABLET, FILM COATED ORAL 2 TIMES DAILY
COMMUNITY
End: 2022-08-30

## 2022-02-25 SDOH — ECONOMIC STABILITY: FOOD INSECURITY: WITHIN THE PAST 12 MONTHS, THE FOOD YOU BOUGHT JUST DIDN'T LAST AND YOU DIDN'T HAVE MONEY TO GET MORE.: NEVER TRUE

## 2022-02-25 SDOH — ECONOMIC STABILITY: FOOD INSECURITY: WITHIN THE PAST 12 MONTHS, YOU WORRIED THAT YOUR FOOD WOULD RUN OUT BEFORE YOU GOT MONEY TO BUY MORE.: NEVER TRUE

## 2022-02-25 ASSESSMENT — ENCOUNTER SYMPTOMS
ABDOMINAL PAIN: 0
SHORTNESS OF BREATH: 0
COUGH: 0
CONSTIPATION: 0
DIARRHEA: 0
BLOOD IN STOOL: 0
NAUSEA: 0
VOMITING: 0
EYE PAIN: 0
BACK PAIN: 0

## 2022-02-25 ASSESSMENT — SOCIAL DETERMINANTS OF HEALTH (SDOH): HOW HARD IS IT FOR YOU TO PAY FOR THE VERY BASICS LIKE FOOD, HOUSING, MEDICAL CARE, AND HEATING?: NOT HARD AT ALL

## 2022-02-25 NOTE — PROGRESS NOTES
[place] [id number]  Dept: [phone]  Dept Fax: [phone]  Loc: [phone]     Bong Cancino is a 64 y.o. female who presents today for her medical conditions/complaintsas noted below. Bong Cancino is c/o of   Chief Complaint   Patient presents with    Hypertension    Other     right Shoulder pain ( no injury)    Hyperlipidemia    Knee Pain     left chronic         HPI:     Hypertension  This is a chronic problem. The current episode started more than 1 year ago. The problem has been waxing and waning since onset. The problem is controlled. Pertinent negatives include no chest pain, headaches, neck pain, palpitations or shortness of breath. Hyperlipidemia  This is a chronic problem. The current episode started more than 1 year ago. The problem is controlled. Recent lipid tests were reviewed and are variable. Pertinent negatives include no chest pain or shortness of breath. Knee Pain   The incident occurred more than 1 week ago. The incident occurred at home. There was no injury mechanism. The pain is present in the left knee. The pain is moderate. The pain has been fluctuating since onset. Pertinent negatives include no numbness. Shoulder Pain   The pain is present in the right shoulder. This is a new problem. The current episode started 1 to 4 weeks ago. The problem occurs intermittently. The problem has been waxing and waning. Pertinent negatives include no fever or numbness.        No results found for: LABA1C         No results found for: LABMICR  LDL Cholesterol (mg/dL)   Date Value   07/14/2021 104   07/15/2020 82   07/26/2019 93         AST (U/L)   Date Value   07/14/2021 22     ALT (U/L)   Date Value   07/14/2021 28     BUN (mg/dL)   Date Value   07/14/2021 23     BP Readings from Last 3 Encounters:   02/25/22 130/60   07/15/21 120/76   03/24/21 137/71              Past Medical History:   Diagnosis Date    Bacillus fragilis infection     treated one year Dr Cherry Kelly    Bronchiectasis [facility])     Chronic lower back pain 2016    Chronic radicular lumbar pain 10/2/2015    Degenerative disc disease     Hyperlipidemia     Hyperreflexic bladder     Hypertension     Hypothyroidism     Primary osteoarthritis of right knee 2017      Past Surgical History:   Procedure Laterality Date    BREAST SURGERY Left 2012    core biopsy    BRONCHOSCOPY Left 2012    COLONOSCOPY      SIS 6 y   [address]    and BSO    OTHER SURGICAL HISTORY Right 11    right L4 Transforaminal epidural steroid injection    OTHER SURGICAL HISTORY Right 10/2/15    right L4 TFE    [place] TOTAL KNEE ARTHROPLASTY Right 2017    KNEE TOTAL ARTHROPLASTY RIGHT WITH WOO & NEPHEW  AQUAMANTIS   PLATELET GEL performed by Ubaldo Rocha DO at [address] [facility] Right 2017       Family History   Problem Relation Age of Onset    Other Mother         Knee pain    No Known Problems Father           Social History     Tobacco Use    Smoking status: Former Smoker     Packs/day: 0.50     Years: 6.00     Pack years: 3.00     Types: Cigarettes     Quit date: 1960     Years since quittin.7    Smokeless tobacco: Never Used    Tobacco comment: quit 35-40 years ago   Substance Use Topics    Alcohol use: No     Alcohol/week: 0.0 standard drinks         Current Outpatient Medications   Medication Sig Dispense Refill    etodolac (LODINE) 400 MG tablet Take 400 mg by mouth 2 times daily      acetaminophen-codeine (TYLENOL/CODEINE #3) 300-30 MG per tablet Take 1 tablet by mouth every 4 hours as needed for Pain.  methylPREDNISolone (MEDROL DOSEPACK) 4 MG tablet Take by mouth.  1 kit 0    atorvastatin (LIPITOR) 40 MG tablet TAKE 1 TABLET DAILY 90 tablet 3    metoprolol tartrate (LOPRESSOR) 25 MG tablet TAKE 1 TABLET TWICE A  tablet 3  oxybutynin (DITROPAN-XL) 10 MG extended release tablet TAKE 1 TABLET DAILY 90 tablet 3    furosemide (LASIX) 20 MG tablet TAKE 1 TABLET DAILY 90 tablet 3    amLODIPine (NORVASC) 10 MG tablet TAKE 1 TABLET DAILY 90 tablet 3    levothyroxine (SYNTHROID) 50 MCG tablet TAKE 1 TABLET DAILY 90 tablet 3    Handicap Placard MISC by Does not apply route DISABLED PARKING PERMIT AUTHORIZATION  Routine     Qty-1    The patient has the following condition(s) which qualifies him/her for disabled parking: Walking severely limited due to orthopedic condition     Privilege Duration: Temporary for 3 months 1 each 0    Methylsulfonylmethane (MSM PO) Take by mouth daily      Nutritional Supplements (SILICA) 67.7 MG CAPS Take by mouth daily      Calcium Carb-Cholecalciferol (CALCIUM 600+D3 PO) Take by mouth 2 times daily      Multiple Vitamins-Minerals (MULTIVITAMIN PO) Take 2 tablets by mouth every morning        Current Facility-Administered Medications   Medication Dose Route Frequency Provider Last Rate Last Admin    lidocaine 1 % injection 4 mL  4 mL IntraDERmal Once Rachelle Cotto PA-C         Allergies   Allergen Reactions    Levaquin [Levofloxacin In D5w] Other (See Comments)     Attacked muscles and tendons    Iodine Other (See Comments)     Cantu       Penicillins Other (See Comments)     abcess at site of injection       Health Maintenance   Topic Date Due    Hepatitis C screen  Never done    DTaP/Tdap/Td vaccine (1 - Tdap) Never done    Shingles Vaccine (3 of 3) 12/15/2020    COVID-19 Vaccine (2 - Moderna 3-dose series) 03/08/2021    Lipid screen  07/14/2022    TSH testing  07/14/2022    Potassium monitoring  07/14/2022    Creatinine monitoring  07/14/2022    Depression Screen  07/15/2022    Annual Wellness Visit (AWV)  07/16/2022    Flu vaccine  Completed    Pneumococcal 65+ years Vaccine  Completed    Hepatitis A vaccine  Aged Out    Hepatitis B vaccine  Aged Out    Hib vaccine  Aged Out  Meningococcal (ACWY) vaccine  Aged Out       Subjective:      Review of Systems   Constitutional: Negative for chills and fever. HENT: Negative for hearing loss. Eyes: Negative for pain and visual disturbance. Respiratory: Negative for cough and shortness of breath. Cardiovascular: Negative for chest pain, palpitations and leg swelling. Gastrointestinal: Negative for abdominal pain, blood in stool, constipation, diarrhea, nausea and vomiting. Endocrine: Negative for cold intolerance, polydipsia and polyuria. Genitourinary: Negative for difficulty urinating, dysuria and hematuria. Musculoskeletal: Negative for arthralgias, back pain, gait problem and neck pain. Skin: Negative for pallor and rash. Neurological: Negative for dizziness, weakness, numbness and headaches. Hematological: Negative for adenopathy. Does not bruise/bleed easily. Psychiatric/Behavioral: Negative for confusion. The patient is not nervous/anxious. Objective:     Physical Exam  Vitals reviewed. Constitutional:       Appearance: She is well-developed. HENT:      Head: Normocephalic and atraumatic. Eyes:      Pupils: Pupils are equal, round, and reactive to light. Cardiovascular:      Rate and Rhythm: Normal rate and regular rhythm. Heart sounds: No murmur heard. No friction rub. No gallop. Pulmonary:      Effort: Pulmonary effort is normal.      Breath sounds: Normal breath sounds. No wheezing or rales. Abdominal:      General: There is no distension. Palpations: Abdomen is soft. There is no mass. Tenderness: There is no abdominal tenderness. There is no rebound. Musculoskeletal:         General: Normal range of motion. Cervical back: Neck supple. Lymphadenopathy:      Cervical: No cervical adenopathy. Skin:     General: Skin is warm and dry. Findings: No rash. Neurological:      Mental Status: She is alert and oriented to person, place, and time.       Cranial Nerves: No cranial nerve deficit (grossly). Psychiatric:         Thought Content: Thought content normal.        /60 (Site: Left Upper Arm, Position: Sitting, Cuff Size: Medium Adult)   Pulse 78   Resp 16   Ht 5' 5\" (1.651 m)   Wt 153 lb 3.2 oz (69.5 kg)   LMP 08/15/1983 (Exact Date)   SpO2 98%   BMI 25.49 kg/m²     Assessment:       Diagnosis Orders   1. Primary hypertension  Comprehensive Metabolic Panel, Fasting   2. Other hyperlipidemia  Lipid Panel   3. Acute pain of right shoulder  XR SHOULDER RIGHT 1 VW    methylPREDNISolone (MEDROL DOSEPACK) 4 MG tablet   4. Chronic pain of left knee     5. Hypothyroidism (acquired)  TSH    T4, Free      I reviewed multiple test results for this appointment. 7/14/2021 normal glucose at 96    7/14/2021 normal total cholesterol at 189    7/14/2021 normal TSH at 2.24. Patient told to continue Lipitor and Synthroid       Plan:       Return in about 6 months (around 8/25/2022) for medicare AWV, Hypertension, Hyperlipidemia. Orders Placed This Encounter   Procedures    XR SHOULDER RIGHT 1 VW     Standing Status:   Future     Standing Expiration Date:   2/25/2023     Order Specific Question:   Reason for exam:     Answer:   shoulder pain    TSH     Standing Status:   Future     Standing Expiration Date:   2/25/2023    Comprehensive Metabolic Panel, Fasting     Standing Status:   Future     Standing Expiration Date:   2/25/2023    T4, Free     Standing Status:   Future     Standing Expiration Date:   2/25/2023    Lipid Panel     Standing Status:   Future     Standing Expiration Date:   2/25/2023     Order Specific Question:   Is Patient Fasting?/# of Hours     Answer:   yes     Orders Placed This Encounter   Medications    methylPREDNISolone (MEDROL DOSEPACK) 4 MG tablet     Sig: Take by mouth. Dispense:  1 kit     Refill:  0       Patientgiven educational materials - see patient instructions.   Discussed use, benefit,and side effects of prescribed medications. All patient questions answered. Ptvoiced understanding. Reviewed health maintenance. Instructed to continue currentmedications, diet and exercise. Patient agreed with treatment plan. Follow up asdirected.      Electronically signed by Pa Talamantes MD on 2/25/2022 at 10:43 AM

## 2022-03-28 ENCOUNTER — OFFICE VISIT (OUTPATIENT)
Dept: INTERNAL MEDICINE | Age: 77
End: 2022-03-28
Payer: MEDICARE

## 2022-03-28 ENCOUNTER — HOSPITAL ENCOUNTER (OUTPATIENT)
Dept: GENERAL RADIOLOGY | Age: 77
Discharge: HOME | End: 2022-03-30
Payer: MEDICARE

## 2022-03-28 VITALS
BODY MASS INDEX: 25.49 KG/M2 | SYSTOLIC BLOOD PRESSURE: 116 MMHG | HEART RATE: 87 BPM | HEIGHT: 65 IN | RESPIRATION RATE: 16 BRPM | DIASTOLIC BLOOD PRESSURE: 70 MMHG | WEIGHT: 153 LBS

## 2022-03-28 DIAGNOSIS — I10 PRIMARY HYPERTENSION: ICD-10-CM

## 2022-03-28 DIAGNOSIS — E78.49 OTHER HYPERLIPIDEMIA: ICD-10-CM

## 2022-03-28 DIAGNOSIS — M25.562 CHRONIC PAIN OF LEFT KNEE: Primary | ICD-10-CM

## 2022-03-28 DIAGNOSIS — G89.29 CHRONIC PAIN OF LEFT KNEE: Primary | ICD-10-CM

## 2022-03-28 DIAGNOSIS — M25.511 ACUTE PAIN OF RIGHT SHOULDER: ICD-10-CM

## 2022-03-28 PROCEDURE — 99213 OFFICE O/P EST LOW 20 MIN: CPT | Performed by: INTERNAL MEDICINE

## 2022-03-28 PROCEDURE — 73030 X-RAY EXAM OF SHOULDER: CPT

## 2022-03-28 PROCEDURE — 99214 OFFICE O/P EST MOD 30 MIN: CPT | Performed by: INTERNAL MEDICINE

## 2022-03-28 ASSESSMENT — ENCOUNTER SYMPTOMS
BACK PAIN: 0
COUGH: 0
CONSTIPATION: 0
SHORTNESS OF BREATH: 0
ABDOMINAL PAIN: 0
NAUSEA: 0
BLOOD IN STOOL: 0
EYE PAIN: 0
VOMITING: 0
DIARRHEA: 0

## 2022-03-28 NOTE — PROGRESS NOTES
[address]  [address] [place] [address]  Dept: [phone]  Dept Fax: [phone]  Loc: [phone]     Allison Emery is a 64 y.o. female who presents today for her medical conditions/complaintsas noted below. Allison Emery is c/o of   Chief Complaint   Patient presents with    Knee Pain     chronic L. pre op for LTKA on 5/19/22    Hypertension    Hyperlipidemia         HPI:     Knee Pain   The incident occurred more than 1 week ago. The incident occurred at home. There was no injury mechanism. The pain is present in the left knee. The pain is moderate. The pain has been fluctuating since onset. Pertinent negatives include no numbness. Hypertension  This is a chronic problem. The current episode started more than 1 year ago. The problem has been waxing and waning since onset. The problem is controlled. Pertinent negatives include no chest pain, headaches, neck pain, palpitations or shortness of breath. Hyperlipidemia  This is a chronic problem. The current episode started more than 1 year ago. The problem is controlled. Recent lipid tests were reviewed and are variable. Pertinent negatives include no chest pain or shortness of breath.        No results found for: LABA1C         No results found for: LABMICR  LDL Cholesterol (mg/dL)   Date Value   07/14/2021 104   07/15/2020 82   07/26/2019 93         AST (U/L)   Date Value   07/14/2021 22     ALT (U/L)   Date Value   07/14/2021 28     BUN (mg/dL)   Date Value   07/14/2021 23     BP Readings from Last 3 Encounters:   03/28/22 116/70   02/25/22 130/60   07/15/21 120/76              Past Medical History:   Diagnosis Date    Bacillus fragilis infection     treated one year Dr Christophe Olivo   [facility]asis [facility]     Chronic lower back pain 2/5/2016    Chronic radicular lumbar pain 10/2/2015    Degenerative disc disease     Hyperlipidemia     Hyperreflexic bladder  Hypertension     Hypothyroidism     Primary osteoarthritis of right knee 2017      Past Surgical History:   Procedure Laterality Date    BREAST SURGERY Left     core biopsy    BRONCHOSCOPY Left 2012    COLONOSCOPY      SIS 6 y   [address]    and BSO    OTHER SURGICAL HISTORY Right 11    right L4 Transforaminal epidural steroid injection    OTHER SURGICAL HISTORY Right 10/2/15    right L4 TFE    [place] TOTAL KNEE ARTHROPLASTY Right 2017    KNEE TOTAL ARTHROPLASTY RIGHT WITH WOO & NEPHEW  AQUAMANTIS   PLATELET GEL performed by Richard Carpenter DO at [address] TOTAL KNEE ARTHROPLASTY Right 2017       Family History   Problem Relation Age of Onset    Other Mother         Knee pain    No Known Problems Father           Social History     Tobacco Use    Smoking status: Former Smoker     Packs/day: 0.50     Years: 6.00     Pack years: 3.00     Types: Cigarettes     Quit date: 1960     Years since quittin.8    Smokeless tobacco: Never Used    Tobacco comment: quit 35-40 years ago   Substance Use Topics    Alcohol use: No     Alcohol/week: 0.0 standard drinks         Current Outpatient Medications   Medication Sig Dispense Refill    etodolac (LODINE) 400 MG tablet Take 400 mg by mouth 2 times daily      acetaminophen-codeine (TYLENOL/CODEINE #3) 300-30 MG per tablet Take 1 tablet by mouth every 4 hours as needed for Pain.  (Patient not taking: Reported on 3/28/2022)      atorvastatin (LIPITOR) 40 MG tablet TAKE 1 TABLET DAILY 90 tablet 3    metoprolol tartrate (LOPRESSOR) 25 MG tablet TAKE 1 TABLET TWICE A  tablet 3    oxybutynin (DITROPAN-XL) 10 MG extended release tablet TAKE 1 TABLET DAILY 90 tablet 3    furosemide (LASIX) 20 MG tablet TAKE 1 TABLET DAILY 90 tablet 3    amLODIPine (NORVASC) 10 MG tablet TAKE 1 TABLET DAILY 90 tablet 3    levothyroxine (SYNTHROID) 50 MCG tablet TAKE 1 TABLET DAILY 90 tablet 3    [facility] Does not apply route DISABLED PARKING PERMIT AUTHORIZATION  Routine     Qty-1    The patient has the following condition(s) which qualifies him/her for disabled parking: Walking severely limited due to orthopedic condition     Privilege Duration: Temporary for 3 months 1 each 0    Methylsulfonylmethane (MSM PO) Take by mouth daily      Nutritional Supplements (SILICA) 83.1 MG CAPS Take by mouth daily      Calcium Carb-Cholecalciferol (CALCIUM 600+D3 PO) Take by mouth 2 times daily      Multiple Vitamins-Minerals (MULTIVITAMIN PO) Take 2 tablets by mouth every morning        Current Facility-Administered Medications   Medication Dose Route Frequency Provider Last Rate Last Admin    lidocaine 1 % injection 4 mL  4 mL IntraDERmal Once Raulito Arguello PA-C         Allergies   Allergen Reactions    Levaquin [Levofloxacin In D5w] Other (See Comments)     Attacked muscles and tendons    Iodine Other (See Comments)     Cantu       Penicillins Other (See Comments)     abcess at site of injection       Health Maintenance   Topic Date Due    Hepatitis C screen  Never done    DTaP/Tdap/Td vaccine (1 - Tdap) Never done    Shingles Vaccine (3 of 3) 12/15/2020    COVID-19 Vaccine (2 - Moderna 3-dose series) 03/08/2021    Lipid screen  07/14/2022    TSH testing  07/14/2022    Potassium monitoring  07/14/2022    Creatinine monitoring  07/14/2022    Depression Screen  07/15/2022    Annual Wellness Visit (AWV)  07/16/2022    Flu vaccine  Completed    Pneumococcal 65+ years Vaccine  Completed    Hepatitis A vaccine  Aged Out    Hepatitis B vaccine  Aged Out    Hib vaccine  Aged Out    Meningococcal (ACWY) vaccine  Aged Out       Subjective:      Review of Systems   Constitutional: Negative for chills and fever. HENT: Negative for hearing loss. Eyes: Negative for pain and visual disturbance. Respiratory: Negative for cough and shortness of breath.     Cardiovascular: Negative for chest pain, palpitations and leg swelling. Gastrointestinal: Negative for abdominal pain, blood in stool, constipation, diarrhea, nausea and vomiting. Endocrine: Negative for cold intolerance, polydipsia and polyuria. Genitourinary: Negative for difficulty urinating, dysuria and hematuria. Musculoskeletal: Negative for arthralgias, back pain, gait problem and neck pain. Skin: Negative for pallor and rash. Neurological: Negative for dizziness, weakness, numbness and headaches. Hematological: Negative for adenopathy. Does not bruise/bleed easily. Psychiatric/Behavioral: Negative for confusion. The patient is not nervous/anxious. Objective:     Physical Exam  Vitals reviewed. Constitutional:       Appearance: She is well-developed. HENT:      Head: Normocephalic and atraumatic. Eyes:      Pupils: Pupils are equal, round, and reactive to light. Cardiovascular:      Rate and Rhythm: Normal rate and regular rhythm. Heart sounds: No murmur heard. No friction rub. No gallop. Pulmonary:      Effort: Pulmonary effort is normal.      Breath sounds: Normal breath sounds. No wheezing or rales. Abdominal:      General: There is no distension. Palpations: Abdomen is soft. There is no mass. Tenderness: There is no abdominal tenderness. There is no rebound. Musculoskeletal:         General: Normal range of motion. Cervical back: Neck supple. Lymphadenopathy:      Cervical: No cervical adenopathy. Skin:     General: Skin is warm and dry. Findings: No rash. Neurological:      Mental Status: She is alert and oriented to person, place, and time. Cranial Nerves: No cranial nerve deficit (grossly). Psychiatric:         Thought Content:  Thought content normal.        /70 (Site: Left Upper Arm, Position: Sitting, Cuff Size: Large Adult)   Pulse 87   Resp 16   Ht 5' 5\" (1.651 m)   Wt 153 lb (69.4 kg)   LMP 08/15/1983 (Exact Date)   BMI 25.46 kg/m² Assessment:       Diagnosis Orders   1. Chronic pain of left knee     2. Other hyperlipidemia     3. Primary hypertension     Reviewed multitest results for this appointment. 7/14/2021 normal glucose 96.    7/14/2021 normal TSH 2.24.    7/14/2021 normal total cholesterol 189. Patient told to continue Lipitor and Synthroid. Patient also told to avoid aspirin and NSAIDs including the etodolac x7 days before surgery. Patient's blood pressure is under good control and is normal.  She is medically cleared for the upcoming knee surgery in May. She will be doing preop labs also in early May. She also has 4/20/2022 cardiology appointment for cardiac preop assessment. Plan:       Return if symptoms worsen or fail to improve, for medicare AWV, Hypertension, Hyperlipidemia, knee pain. No orders of the defined types were placed in this encounter. No orders of the defined types were placed in this encounter. Patientgiven educational materials - see patient instructions. Discussed use, benefit,and side effects of prescribed medications. All patient questions answered. Ptvoiced understanding. Reviewed health maintenance. Instructed to continue currentmedications, diet and exercise. Patient agreed with treatment plan. Follow up asdirected.      Electronically signed by Danii Curtis MD on 3/28/2022 at 10:49 AM

## 2022-04-19 ENCOUNTER — TELEPHONE (OUTPATIENT)
Dept: INTERNAL MEDICINE | Age: 77
End: 2022-04-19

## 2022-04-19 RX ORDER — METHYLPREDNISOLONE 4 MG/1
TABLET ORAL
Qty: 1 KIT | Refills: 0 | Status: SHIPPED | OUTPATIENT
Start: 2022-04-19 | End: 2022-04-25

## 2022-04-19 NOTE — TELEPHONE ENCOUNTER
----- Message from Paty Ruiz sent at 4/19/2022 11:53 AM EDT -----  Subject: Message to Provider    QUESTIONS  Information for Provider? Pt called and her shoulder pain has came back   and she has a total knee replacement on 05/19 and does not want both of   them in pain.  ---------------------------------------------------------------------------  --------------  CALL BACK INFO  What is the best way for the office to contact you? OK to leave message on   voicemail  Preferred Call Back Phone Number? [phone]  ---------------------------------------------------------------------------  --------------  SCRIPT ANSWERS  Relationship to Patient?  Self

## 2022-04-20 ENCOUNTER — TELEPHONE (OUTPATIENT)
Dept: CARDIOLOGY | Age: 77
End: 2022-04-20

## 2022-04-20 ENCOUNTER — OFFICE VISIT (OUTPATIENT)
Dept: CARDIOLOGY | Age: 77
End: 2022-04-20
Payer: MEDICARE

## 2022-04-20 ENCOUNTER — HOSPITAL ENCOUNTER (OUTPATIENT)
Dept: LAB | Age: 77
Discharge: HOME | End: 2022-04-20
Payer: MEDICARE

## 2022-04-20 VITALS
DIASTOLIC BLOOD PRESSURE: 66 MMHG | WEIGHT: 156.4 LBS | HEART RATE: 66 BPM | BODY MASS INDEX: 26.06 KG/M2 | SYSTOLIC BLOOD PRESSURE: 118 MMHG | HEIGHT: 65 IN

## 2022-04-20 DIAGNOSIS — I49.3 PVC'S (PREMATURE VENTRICULAR CONTRACTIONS): ICD-10-CM

## 2022-04-20 DIAGNOSIS — Z01.818 PRE-OP EVALUATION: ICD-10-CM

## 2022-04-20 DIAGNOSIS — E78.5 HYPERLIPIDEMIA, UNSPECIFIED HYPERLIPIDEMIA TYPE: ICD-10-CM

## 2022-04-20 DIAGNOSIS — R94.31 ABNORMAL ECG: ICD-10-CM

## 2022-04-20 DIAGNOSIS — I10 PRIMARY HYPERTENSION: ICD-10-CM

## 2022-04-20 DIAGNOSIS — R06.09 DYSPNEA ON EXERTION: Primary | ICD-10-CM

## 2022-04-20 LAB
ANION GAP SERPL CALCULATED.3IONS-SCNC: 10 MMOL/L (ref 9–17)
BUN BLDV-MCNC: 23 MG/DL (ref 8–23)
BUN/CREAT BLD: 23 (ref 9–20)
CALCIUM SERPL-MCNC: 10.1 MG/DL (ref 8.6–10.4)
CHLORIDE BLD-SCNC: 107 MMOL/L (ref 98–107)
CO2: 30 MMOL/L (ref 20–31)
CREAT SERPL-MCNC: 0.98 MG/DL (ref 0.5–0.9)
GFR AFRICAN AMERICAN: >60 ML/MIN
GFR NON-AFRICAN AMERICAN: 55 ML/MIN
GFR SERPL CREATININE-BSD FRML MDRD: ABNORMAL ML/MIN/{1.73_M2}
GLUCOSE BLD-MCNC: 102 MG/DL (ref 70–99)
MAGNESIUM: 2.4 MG/DL (ref 1.6–2.6)
POTASSIUM SERPL-SCNC: 3.9 MMOL/L (ref 3.7–5.3)
SODIUM BLD-SCNC: 147 MMOL/L (ref 135–144)

## 2022-04-20 PROCEDURE — 36415 COLL VENOUS BLD VENIPUNCTURE: CPT

## 2022-04-20 PROCEDURE — 83735 ASSAY OF MAGNESIUM: CPT

## 2022-04-20 PROCEDURE — 99214 OFFICE O/P EST MOD 30 MIN: CPT | Performed by: INTERNAL MEDICINE

## 2022-04-20 PROCEDURE — 93005 ELECTROCARDIOGRAM TRACING: CPT | Performed by: INTERNAL MEDICINE

## 2022-04-20 PROCEDURE — 93010 ELECTROCARDIOGRAM REPORT: CPT | Performed by: INTERNAL MEDICINE

## 2022-04-20 PROCEDURE — 80048 BASIC METABOLIC PNL TOTAL CA: CPT

## 2022-04-20 NOTE — TELEPHONE ENCOUNTER
Needs cardiac clearance for left knee surgery on May 19 th pending stress and echo     Carry MD Nely    [address] [facility]    [facility],  [address]   Phone: [phone]    Fax: [phone]

## 2022-04-20 NOTE — PROGRESS NOTES
Today's Date: 4/20/2022  Patient's Name: Taran Price  Patient's age: 76 y.o., 1945    Subjective:  Taran Price is being seen in clinic today regarding preop evaluation total left knee    she is here to re-establish cardiology care. She was seen in cardiology 7/2019. She needs left total knee replacement. She reports dyspnea on exertion. No chest pain, no PND, no syncope or pre-syncope, no orthopnea. She does not feel palpitations. She is unable to walk any meaningful distance due to significant DJD of left knee. Past Medical History:   has a past medical history of Bacillus fragilis infection, Bronchiectasis ([address] Chronic lower back pain, Chronic radicular lumbar pain, Degenerative disc disease, Hyperlipidemia, Hyperreflexic bladder, Hypertension, Hypothyroidism, and Primary osteoarthritis of right knee. Past Surgical History:   has a past surgical history that includes Hysterectomy (1983); bronchoscopy (Left, 6/19/2012); other surgical history (Right, 4/21/11); Breast surgery (Left, 2012); Colonoscopy (9/13); Tonsillectomy; other surgical history (Right, 10/2/15); Total knee arthroplasty (Right, 12/19/2017); and pr total knee arthroplasty (Right, 12/19/2017). Home Medications:  Prior to Admission medications    Medication Sig Start Date End Date Taking? Authorizing Provider   methylPREDNISolone (MEDROL DOSEPACK) 4 MG tablet Take by mouth. 4/19/22 4/25/22  Valeriy Barr MD   etodolac (LODINE) 400 MG tablet Take 400 mg by mouth 2 times daily    Historical Provider, MD   acetaminophen-codeine (TYLENOL/CODEINE #3) 300-30 MG per tablet Take 1 tablet by mouth every 4 hours as needed for Pain.   Patient not taking: Reported on 3/28/2022    Historical Provider, MD   atorvastatin (LIPITOR) 40 MG tablet TAKE 1 TABLET DAILY 2/10/22   Valeriy Barr MD   metoprolol tartrate (LOPRESSOR) 25 MG tablet TAKE 1 TABLET TWICE A DAY 1/10/22   Valeriy Barr MD   oxybutynin (DITROPAN-XL) 10 MG extended release tablet TAKE 1 TABLET DAILY 1/10/22   Ethan Parker MD   furosemide (LASIX) 20 MG tablet TAKE 1 TABLET DAILY 7/27/21   Ethan Parker MD   amLODIPine (NORVASC) 10 MG tablet TAKE 1 TABLET DAILY 6/1/21   Ganesh Syed DO   levothyroxine (SYNTHROID) 50 MCG tablet TAKE 1 TABLET DAILY 5/24/21   Ethan Parker MD   Handicap Placard MISC by Does not apply route DISABLED PARKING PERMIT AUTHORIZATION  Routine     Qty-1    The patient has the following condition(s) which qualifies him/her for disabled parking: Walking severely limited due to orthopedic condition     Privilege Duration: Temporary for 3 months 3/17/21   Micaela Watters PA-C   Methylsulfonylmethane (MSM PO) Take by mouth daily    Historical Provider, MD   Nutritional Supplements (SILICA) 64.6 MG CAPS Take by mouth daily    Historical Provider, MD   Calcium Carb-Cholecalciferol (CALCIUM 600+D3 PO) Take by mouth 2 times daily    Historical Provider, MD   Multiple Vitamins-Minerals (MULTIVITAMIN PO) Take 2 tablets by mouth every morning     Historical Provider, MD       Allergies:  Levaquin [levofloxacin in d5w], Iodine, and Penicillins    Social History:   reports that she quit smoking about 61 years ago. Her smoking use included cigarettes. She has a 3.00 pack-year smoking history. She has never used smokeless tobacco. She reports that she does not drink alcohol and does not use drugs. Family History: family history includes No Known Problems in her father; Other in her mother. No h/o sudden cardiac death. No for premature CAD    REVIEW OF SYSTEMS:    · Constitutional: there has been no unanticipated weight loss. There's been No change in energy level, No change in activity level. · Eyes: No visual changes or diplopia. No scleral icterus. · ENT: No Headaches, hearing loss or vertigo. No mouth sores or sore throat.   · Cardiovascular: see above  · Respiratory: see above  · Gastrointestinal: No abdominal pain, appetite loss, blood in stools. · Genitourinary: No dysuria, trouble voiding, or hematuria. · Musculoskeletal:  No gait disturbance, No weakness or joint complaints. · Integumentary: No rash or pruritis. · Neurological: No headache or diplopia. No tingling  · Psychiatric: No anxiety, or depression. · Endocrine: No temperature intolerance. · Hematologic/Lymphatic: No abnormal bruising or bleeding, blood clots or swollen lymph nodes. · Allergic/Immunologic: No nasal congestion or hives. PHYSICAL EXAM:      Vitals:    04/20/22 0942   BP: 118/66   Pulse: 66       Constitutional and General Appearance: alert, cooperative, no distress and appears stated age  HEENT: PERRL, no cervical lymphadenopathy. No masses palpable. Normal oral mucosa  Respiratory:  · Normal excursion and expansion without use of accessory muscles  · Resp Auscultation: Good respiratory effort. No for increased work of breathing. On auscultation: clear to auscultation bilaterally  Cardiovascular:  · Heart tones are crisp and normal. regular S1 and S2.  · Jugular venous pulsation Normal  · The carotid upstroke is normal in amplitude and contour without delay or bruit   Abdomen:   · soft  · Bowel sounds present  Extremities:  ·  No edema  Neurological:  · Alert and oriented. Cardiac Data:  EKG: SR, frequent PVCS    Labs:     CBC: No results for input(s): WBC, HGB, HCT, PLT in the last 72 hours. BMP: No results for input(s): NA, K, CO2, BUN, CREATININE, LABGLOM, GLUCOSE in the last 72 hours. PT/INR: No results for input(s): PROTIME, INR in the last 72 hours. FASTING LIPID PANEL:  Lab Results   Component Value Date    HDL 68 07/14/2021    TRIG 83 07/14/2021     LIVER PROFILE:No results for input(s): AST, ALT, LABALBU in the last 72 hours.     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  No ischemia or infarction. 2.  Normal LVEF = 60%. 3.  No wall motion abnormalities.     Echo 12/11/2017   1.  Left ventricular dimensions are within normal limits.  LV ejection fraction is 50% to 55%. 2.  Grade 1 diastolic dysfunction. 3.  Normal right ventricular systolic function. 4.  Focal thickening of mitral valve noted, mild-to-moderate mitral regurgitation. 5.  Mild tricuspid regurgitation.  Right ventricular systolic pressure 35 mmHg. 6.  No pericardial effusion.     Assessment and plan:     -Pre op left total knee- Abnormal ECG. Frequent PVCS. I will obtain lexiscan stress test for risk stratification. Patient is unable to walk any meaningful distance. -PVCs- continue metoprolol. Obtain holter monitor 48 hours. I will also obtain TTE to evaluate for cardiomyopathy. BMP and Mag  -Leg edema- obtain to evaluate for LV function. Lasix 20mg po qday. Compression stocking.  -Normal stress test 12/11/17 with normal LVEF on TTE 12/11/17. -HTN- failry well controlled at this time. Continue current therapy. Stable. -Hyperlipidemia- continue statin. -Hypothyroidism  -S/p R TKA- winter 2018  -RTC 6 months.     [facility]  [phone]

## 2022-04-27 ENCOUNTER — HOSPITAL ENCOUNTER (OUTPATIENT)
Dept: NON INVASIVE DIAGNOSTICS | Age: 77
Discharge: HOME | End: 2022-04-27
Payer: MEDICARE

## 2022-04-27 ENCOUNTER — HOSPITAL ENCOUNTER (OUTPATIENT)
Dept: NUCLEAR MEDICINE | Age: 77
Discharge: HOME | End: 2022-04-29
Payer: MEDICARE

## 2022-04-27 DIAGNOSIS — Z01.818 PRE-OP EVALUATION: ICD-10-CM

## 2022-04-27 DIAGNOSIS — R94.31 ABNORMAL ECG: ICD-10-CM

## 2022-04-27 DIAGNOSIS — R06.09 DYSPNEA ON EXERTION: ICD-10-CM

## 2022-04-27 LAB
LV EF: 63 %
LVEF MODALITY: NORMAL

## 2022-04-27 PROCEDURE — 6360000002 HC RX W HCPCS: Performed by: INTERNAL MEDICINE

## 2022-04-27 PROCEDURE — A9500 TC99M SESTAMIBI: HCPCS | Performed by: INTERNAL MEDICINE

## 2022-04-27 PROCEDURE — 93306 TTE W/DOPPLER COMPLETE: CPT

## 2022-04-27 PROCEDURE — 78452 HT MUSCLE IMAGE SPECT MULT: CPT

## 2022-04-27 PROCEDURE — 93017 CV STRESS TEST TRACING ONLY: CPT

## 2022-04-27 PROCEDURE — 93018 CV STRESS TEST I&R ONLY: CPT | Performed by: INTERNAL MEDICINE

## 2022-04-27 PROCEDURE — 3430000000 HC RX DIAGNOSTIC RADIOPHARMACEUTICAL: Performed by: INTERNAL MEDICINE

## 2022-04-27 RX ADMIN — TETRAKIS(2-METHOXYISOBUTYLISOCYANIDE)COPPER(I) TETRAFLUOROBORATE 10 MILLICURIE: 1 INJECTION, POWDER, LYOPHILIZED, FOR SOLUTION INTRAVENOUS at 09:52

## 2022-04-27 RX ADMIN — TETRAKIS(2-METHOXYISOBUTYLISOCYANIDE)COPPER(I) TETRAFLUOROBORATE 30 MILLICURIE: 1 INJECTION, POWDER, LYOPHILIZED, FOR SOLUTION INTRAVENOUS at 09:52

## 2022-04-27 RX ADMIN — REGADENOSON 0.4 MG: 0.08 INJECTION, SOLUTION INTRAVENOUS at 09:20

## 2022-04-27 NOTE — PROGRESS NOTES
Patient Name:  Galo Gutiérrez MRN:  [id number]   :  1945  Age:  76 y.o.  Sex: female   Ordering Provider: Noam Lo MD  Referring Provider: Conrad Cuellar MD  Primary Care Provider:  Conrad Cuellar MD     Indications: abnormal EKG, DELVALLE, and pre-op exam     Medications:     Current Outpatient Medications:     etodolac (LODINE) 400 MG tablet, Take 400 mg by mouth 2 times daily, Disp: , Rfl:     acetaminophen-codeine (TYLENOL/CODEINE #3) 300-30 MG per tablet, Take 1 tablet by mouth every 4 hours as needed for Pain. , Disp: , Rfl:     atorvastatin (LIPITOR) 40 MG tablet, TAKE 1 TABLET DAILY, Disp: 90 tablet, Rfl: 3    metoprolol tartrate (LOPRESSOR) 25 MG tablet, TAKE 1 TABLET TWICE A DAY, Disp: 180 tablet, Rfl: 3    oxybutynin (DITROPAN-XL) 10 MG extended release tablet, TAKE 1 TABLET DAILY, Disp: 90 tablet, Rfl: 3    furosemide (LASIX) 20 MG tablet, TAKE 1 TABLET DAILY, Disp: 90 tablet, Rfl: 3    amLODIPine (NORVASC) 10 MG tablet, TAKE 1 TABLET DAILY, Disp: 90 tablet, Rfl: 3    levothyroxine (SYNTHROID) 50 MCG tablet, TAKE 1 TABLET DAILY, Disp: 90 tablet, Rfl: 3    Handicap Placard MISC, by Does not apply route DISABLED PARKING PERMIT AUTHORIZATION Routine   Qty-1  The patient has the following condition(s) which qualifies him/her for disabled parking: Walking severely limited due to orthopedic condition   Privilege Duration: Temporary for 3 months, Disp: 1 each, Rfl: 0    Methylsulfonylmethane (MSM PO), Take by mouth daily, Disp: , Rfl:     Nutritional Supplements (SILICA) 71.6 MG CAPS, Take by mouth daily, Disp: , Rfl:     Calcium Carb-Cholecalciferol (CALCIUM 600+D3 PO), Take by mouth 2 times daily, Disp: , Rfl:     Multiple Vitamins-Minerals (MULTIVITAMIN PO), Take 2 tablets by mouth every morning , Disp: , Rfl:     Current Facility-Administered Medications:     regadenoson (LEXISCAN) injection 0.4 mg, 0.4 mg, IntraVENous, Once, Noam Lo MD    lidocaine 1 % injection 4 mL, 4 mL, IntraDERmal, Once, Santa Garvin PA-C    Facility-Administered Medications Ordered in Other Encounters:     technetium sestamibi (CARDIOLITE) injection 30 millicurie, 30 millicurie, IntraVENous, ONCE PRNLuke MD    technetium sestamibi (CARDIOLITE) injection 10 millicurie, 10 millicurie, IntraVENous, ONCE PRN, Luke Powell MD      ----------------------------------------------------------------------------------------------------------                Zeb Yaakov 0.4 mg injected over 10 seconds. IV Cardiolite injected 20 seconds post Lexiscan injection. Heart Rate:  58  Resting Blood Pressure:  157/73   ----------------------------------------------------------------------------------------------------------     HR BP   1 min 90 138/72   2 min     3 min 85 123/65   4 min     5 min 81 119/69   6 min     7 min 75 115/61   8 min     9 min     10 min       Symptoms:  Chest Pain:  No      Nausea:  No     Headache:  No    Shortness of Breath:  Yes     Other:  Yes, stomach cramps        Electronically signed by Sury Marcano RCP on 4/27/22 at 9:07 AM EDT    ----------------------------------------------------------------------------------------------------------  Resting EKG:  Sinus bradycardia, normal ECG    Arrhythmias:  Frequent PVCs, one ventricular couplet    EKG Changes:  No ischemic changes noted. Interpretation:  No ischemic changes noted.   Frequent PVCs, one ventricular couplet      Supervising Physician:  Luke Powell MD

## 2022-04-27 NOTE — PROCEDURES
[address]                 [address] [place] [id number]                              CARDIAC STRESS TEST    PATIENT NAME: Tho Clarke                     :        1945  MED REC NO:   [id number]                             ROOM:  ACCOUNT NO:   [de-identified]                           ADMIT DATE: 2022  PROVIDER:     Armen Dos Santos MD    DATE OF STUDY:  2022    STRESS TEST    Ordering Provider:  Armen Dos Santos MD    Primary Care Provider:  Jessica Schwarz MD    Patient's Age: 76     Height: 5 feet 5 inches  Weight: 156 pounds    INDICATION:  Shortness of breath, abnormal EKG. Lexiscan 0.4 mg injected over 10 seconds. IV Cardiolite injected 20 seconds post Lexiscan injection. Heart Rate: 58 Resting blood pressure:  157/73              HR   BP  1 min          90   138/72  2 min  3 min          85   123/65  4 min  5 min          81   119/69  6 min  7 min          75   115/61  8 min  9 min  10 min    Symptoms:  Chest Pain: No  Nausea: No  Headache:  No  Shortness of  breath: Yes  Other: Yes, stomach cramps    Resting EKG:  Sinus bradycardia, normal ECG. Arrhythmias:  Frequent PVCs, one ventricular couplet. EKG changes:  No ischemic changes noted. INTERPRETATION:  No ischemic changes noted. Frequent PVCs, one  ventricular couplet. Nuclear Myocardial Perfusion Imaging (SPECT)    TESTING METHOD  STRESS:   Lexiscan  AGENT:    Cardiolite  REST:          Injection Date:  2022  Time:  815  Amount:  10.2  mCi  STRESS:   Injection Date:  2022  Time:  920  Amount:  30.7 mCi  IMAGE TIME:    Rest:  857  Stress:  1000    EF:  62%  TID:  1.08  LHR:  0.52    FINDINGS:  Ischemia (Reversible Defect):           No  Infarction (Irreversible Defect):       No  Normal Ejection Fraction:               Yes  Normal Segmental Wall Motion:           Yes    Low risk study. IMPRESSION:  1. No ischemia or infarction.   2.  Normal LV systolic function; LVEF 65%.         Karen Bates MD    D: 04/27/2022 14:57:34       T: 04/27/2022 14:58:39     /[email or web address]  Job#: [id number]     Doc#: Unknown    CC:  Ely Almazan

## 2022-04-27 NOTE — TELEPHONE ENCOUNTER
[place] [address]  [address] [facility]  [place] [address]  Dept: MD [facility]        4/20/2022 (void after 30 days)        Re:  Rafael Dawn            1945        Procedure/Surgery: Left knee replacement    Rafael Dawn is at moderate cardiac risk, but acceptable for the planned surgical procedure. She may proceed accordingly.         Sincerely,      Angie Church MD

## 2022-05-06 ENCOUNTER — TELEPHONE (OUTPATIENT)
Dept: CARDIOLOGY | Age: 77
End: 2022-05-06

## 2022-05-06 NOTE — TELEPHONE ENCOUNTER
The patient called because Dr. Nicole Ventura is supposed to be having her complete testing for cardiac clearance. She stated that she received a denial for her myocardiac perfusion. She asked if it is possible for it to be coded differently to get it approved.

## 2022-05-09 NOTE — TELEPHONE ENCOUNTER
Spoke to Katya Emery at [employer] at  [phone]. She stated the appeals department is working on it.

## 2022-05-19 DIAGNOSIS — E03.9 ACQUIRED HYPOTHYROIDISM: ICD-10-CM

## 2022-05-19 RX ORDER — LEVOTHYROXINE SODIUM 0.05 MG/1
TABLET ORAL
Qty: 90 TABLET | Refills: 3 | Status: SHIPPED | OUTPATIENT
Start: 2022-05-19

## 2022-05-26 RX ORDER — AMLODIPINE BESYLATE 10 MG/1
TABLET ORAL
Qty: 90 TABLET | Refills: 3 | Status: SHIPPED | OUTPATIENT
Start: 2022-05-26

## 2022-06-21 ENCOUNTER — TELEPHONE (OUTPATIENT)
Dept: INTERNAL MEDICINE | Age: 77
End: 2022-06-21

## 2022-06-21 DIAGNOSIS — M25.561 ACUTE PAIN OF RIGHT KNEE: ICD-10-CM

## 2022-06-21 DIAGNOSIS — G47.00 INSOMNIA, UNSPECIFIED TYPE: Primary | ICD-10-CM

## 2022-06-21 RX ORDER — HYDROCODONE BITARTRATE AND ACETAMINOPHEN 5; 325 MG/1; MG/1
1 TABLET ORAL EVERY 6 HOURS PRN
Qty: 40 TABLET | Refills: 0 | Status: SHIPPED | OUTPATIENT
Start: 2022-06-21 | End: 2022-07-01

## 2022-06-21 RX ORDER — ZOLPIDEM TARTRATE 5 MG/1
5 TABLET ORAL NIGHTLY PRN
Qty: 30 TABLET | Refills: 1 | Status: SHIPPED | OUTPATIENT
Start: 2022-06-21 | End: 2022-08-30 | Stop reason: SDUPTHER

## 2022-06-21 NOTE — TELEPHONE ENCOUNTER
Last appt: 3/28/2022  Next appt: 8/30/2022    Patient called in saying that she had a Lt total knee done 4 weeks ago. She has not been sleeping and is still in pain and was wondering if  could prescribe something to help her sleep. She is to the point where she feels depressed because she not sleeping and the pain medication did not help.    She uses [address] for a pharmacy

## 2022-07-22 DIAGNOSIS — I10 ESSENTIAL HYPERTENSION: ICD-10-CM

## 2022-07-25 RX ORDER — FUROSEMIDE 20 MG/1
TABLET ORAL
Qty: 90 TABLET | Refills: 3 | Status: SHIPPED | OUTPATIENT
Start: 2022-07-25

## 2022-08-30 ENCOUNTER — OFFICE VISIT (OUTPATIENT)
Dept: INTERNAL MEDICINE | Age: 77
End: 2022-08-30
Payer: MEDICARE

## 2022-08-30 VITALS
HEART RATE: 65 BPM | HEIGHT: 65 IN | SYSTOLIC BLOOD PRESSURE: 122 MMHG | DIASTOLIC BLOOD PRESSURE: 78 MMHG | BODY MASS INDEX: 25.33 KG/M2 | WEIGHT: 152 LBS | RESPIRATION RATE: 16 BRPM

## 2022-08-30 DIAGNOSIS — Z00.00 GENERAL MEDICAL EXAM: Primary | ICD-10-CM

## 2022-08-30 DIAGNOSIS — G47.00 INSOMNIA, UNSPECIFIED TYPE: ICD-10-CM

## 2022-08-30 DIAGNOSIS — M25.562 CHRONIC PAIN OF LEFT KNEE: ICD-10-CM

## 2022-08-30 DIAGNOSIS — E78.49 OTHER HYPERLIPIDEMIA: ICD-10-CM

## 2022-08-30 DIAGNOSIS — Z00.00 MEDICARE ANNUAL WELLNESS VISIT, SUBSEQUENT: ICD-10-CM

## 2022-08-30 DIAGNOSIS — G89.29 CHRONIC PAIN OF LEFT KNEE: ICD-10-CM

## 2022-08-30 DIAGNOSIS — I10 PRIMARY HYPERTENSION: ICD-10-CM

## 2022-08-30 PROCEDURE — 1123F ACP DISCUSS/DSCN MKR DOCD: CPT | Performed by: INTERNAL MEDICINE

## 2022-08-30 PROCEDURE — G0439 PPPS, SUBSEQ VISIT: HCPCS | Performed by: INTERNAL MEDICINE

## 2022-08-30 PROCEDURE — 99214 OFFICE O/P EST MOD 30 MIN: CPT | Performed by: INTERNAL MEDICINE

## 2022-08-30 PROCEDURE — 99397 PER PM REEVAL EST PAT 65+ YR: CPT | Performed by: INTERNAL MEDICINE

## 2022-08-30 RX ORDER — ZOLPIDEM TARTRATE 5 MG/1
5 TABLET ORAL NIGHTLY PRN
Qty: 30 TABLET | Refills: 2 | Status: SHIPPED | OUTPATIENT
Start: 2022-08-30 | End: 2022-10-29

## 2022-08-30 ASSESSMENT — ENCOUNTER SYMPTOMS
DIARRHEA: 0
BACK PAIN: 0
NAUSEA: 0
COUGH: 0
EYE PAIN: 0
CONSTIPATION: 0
ABDOMINAL PAIN: 0
VOMITING: 0
SHORTNESS OF BREATH: 0
BLOOD IN STOOL: 0

## 2022-08-30 ASSESSMENT — LIFESTYLE VARIABLES
HOW OFTEN DO YOU HAVE A DRINK CONTAINING ALCOHOL: NEVER
HOW MANY STANDARD DRINKS CONTAINING ALCOHOL DO YOU HAVE ON A TYPICAL DAY: PATIENT DOES NOT DRINK

## 2022-08-30 ASSESSMENT — PATIENT HEALTH QUESTIONNAIRE - PHQ9
SUM OF ALL RESPONSES TO PHQ QUESTIONS 1-9: 0
1. LITTLE INTEREST OR PLEASURE IN DOING THINGS: 0
2. FEELING DOWN, DEPRESSED OR HOPELESS: 0
SUM OF ALL RESPONSES TO PHQ QUESTIONS 1-9: 0
SUM OF ALL RESPONSES TO PHQ9 QUESTIONS 1 & 2: 0
SUM OF ALL RESPONSES TO PHQ QUESTIONS 1-9: 0
SUM OF ALL RESPONSES TO PHQ QUESTIONS 1-9: 0

## 2022-08-30 NOTE — PROGRESS NOTES
[place] [address]  Dept: [phone]  Dept Fax: [phone]  Loc: [phone]     Margarita Avalos is a 76 y.o. female who presents today for her medical conditions/complaintsas noted below. Margarita Avalos is c/o of   Chief Complaint   Patient presents with    Medicare AWV     6 month    Hypertension    Hyperlipidemia    Knee Pain     Chronic left         HPI:     Hypertension  This is a chronic problem. The current episode started more than 1 year ago. The problem has been waxing and waning since onset. The problem is controlled. Pertinent negatives include no chest pain, headaches, neck pain, palpitations or shortness of breath. Hyperlipidemia  This is a chronic problem. The current episode started more than 1 year ago. The problem is controlled. Recent lipid tests were reviewed and are variable. Pertinent negatives include no chest pain or shortness of breath. Knee Pain   The incident occurred more than 1 week ago. The incident occurred at home. There was no injury mechanism. The pain is present in the left knee. The pain is mild. The pain has been Improving since onset. Pertinent negatives include no numbness. Other  This is a recurrent (4-General medical exam) problem. The current episode started today. The problem occurs intermittently. The problem has been waxing and waning. Pertinent negatives include no abdominal pain, arthralgias, chest pain, chills, coughing, fever, headaches, nausea, neck pain, numbness, rash, vomiting or weakness.      No results found for: LABA1C         No results found for: LABMICR  LDL Cholesterol (mg/dL)   Date Value   07/14/2021 104   07/15/2020 82   07/26/2019 93         AST (U/L)   Date Value   07/14/2021 22     ALT (U/L)   Date Value   07/14/2021 28     BUN (mg/dL)   Date Value   04/20/2022 23     BP Readings from Last 3 Encounters:   08/30/22 122/78 22 118/66   22 116/70              Past Medical History:   Diagnosis Date    Bacillus fragilis infection     treated one year Dr Hortensia Gillis    Bronchiectasis ([facility] [address]     Chronic lower back pain 2016    Chronic radicular lumbar pain 10/2/2015    Degenerative disc disease     Hyperlipidemia     Hyperreflexic bladder     Hypertension     Hypothyroidism     Primary osteoarthritis of right knee 2017      Past Surgical History:   Procedure Laterality Date    BREAST SURGERY Left     core biopsy    BRONCHOSCOPY Left 2012    COLONOSCOPY      SIS 10 y    HYSTERECTOMY (CERVIX STATUS UNKNOWN)      and BSO    OTHER SURGICAL HISTORY Right 11    right L4 Transforaminal epidural steroid injection    OTHER SURGICAL HISTORY Right 10/2/15    right L4 TFE    MN TOTAL KNEE ARTHROPLASTY Right 2017    KNEE TOTAL ARTHROPLASTY RIGHT WITH WOO & NEPHEW  AQUAMANTIS   PLATELET GEL performed by Abby Mcgarry DO at [facility] Right 2017       Family History   Problem Relation Age of Onset    Other Mother         Knee pain    No Known Problems Father           Social History     Tobacco Use    Smoking status: Former     Packs/day: 0.50     Years: 6.00     Pack years: 3.00     Types: Cigarettes     Quit date: 1960     Years since quittin.3    Smokeless tobacco: Never    Tobacco comments:     quit 35-40 years ago   Substance Use Topics    Alcohol use: No     Alcohol/week: 0.0 standard drinks         Current Outpatient Medications   Medication Sig Dispense Refill    zolpidem (AMBIEN) 5 MG tablet Take 1 tablet by mouth nightly as needed for Sleep for up to 60 days.  30 tablet 2    furosemide (LASIX) 20 MG tablet TAKE 1 TABLET DAILY 90 tablet 3    amLODIPine (NORVASC) 10 MG tablet TAKE 1 TABLET DAILY 90 tablet 3    levothyroxine (SYNTHROID) 50 MCG tablet TAKE 1 TABLET DAILY 90 tablet 3    atorvastatin (LIPITOR) 40 MG tablet TAKE 1 TABLET DAILY 90 tablet 3    metoprolol tartrate (LOPRESSOR) 25 MG tablet TAKE 1 TABLET TWICE A  tablet 3    oxybutynin (DITROPAN-XL) 10 MG extended release tablet TAKE 1 TABLET DAILY 90 tablet 3    Handicap Placard MISC by Does not apply route DISABLED PARKING PERMIT AUTHORIZATION  Routine     Qty-1    The patient has the following condition(s) which qualifies him/her for disabled parking: Walking severely limited due to orthopedic condition     Privilege Duration: Temporary for 3 months 1 each 0    Methylsulfonylmethane (MSM PO) Take by mouth daily      Calcium Carb-Cholecalciferol (CALCIUM 600+D3 PO) Take by mouth 2 times daily      Multiple Vitamins-Minerals (MULTIVITAMIN PO) Take 2 tablets by mouth every morning        Current Facility-Administered Medications   Medication Dose Route Frequency Provider Last Rate Last Admin    lidocaine 1 % injection 4 mL  4 mL IntraDERmal Once Philomena Fong PA-C         Allergies   Allergen Reactions    Levaquin [Levofloxacin In D5w] Other (See Comments)     Attacked muscles and tendons    Iodine Other (See Comments)     Burns       Penicillins Other (See Comments)     abcess at site of injection       Health Maintenance   Topic Date Due    Hepatitis C screen  Never done    DTaP/Tdap/Td vaccine (1 - Tdap) Never done    Shingles vaccine (3 of 3) 12/15/2020    COVID-19 Vaccine (2 - Moderna series) 03/08/2021    Lipids  07/14/2022    Depression Screen  07/15/2022    Flu vaccine (1) 09/01/2022    Annual Wellness Visit (AWV)  08/31/2023    Pneumococcal 65+ years Vaccine  Completed    Hepatitis A vaccine  Aged Out    Hepatitis B vaccine  Aged Out    Hib vaccine  Aged Out    Meningococcal (ACWY) vaccine  Aged Out       Subjective:      Review of Systems   Constitutional:  Negative for chills and fever. HENT:  Negative for hearing loss. Eyes:  Negative for pain and visual disturbance. Respiratory:  Negative for cough and shortness of breath.     Cardiovascular:  Negative for chest pain, palpitations and leg swelling. Gastrointestinal:  Negative for abdominal pain, blood in stool, constipation, diarrhea, nausea and vomiting. Endocrine: Negative for cold intolerance, polydipsia and polyuria. Genitourinary:  Negative for difficulty urinating, dysuria and hematuria. Musculoskeletal:  Negative for arthralgias, back pain, gait problem and neck pain. Skin:  Negative for pallor and rash. Neurological:  Negative for dizziness, weakness, numbness and headaches. Hematological:  Negative for adenopathy. Does not bruise/bleed easily. Psychiatric/Behavioral:  Negative for confusion. The patient is not nervous/anxious. Objective:     Physical Exam  Vitals reviewed. Constitutional:       Appearance: She is well-developed. HENT:      Head: Normocephalic and atraumatic. Eyes:      Pupils: Pupils are equal, round, and reactive to light. Cardiovascular:      Rate and Rhythm: Normal rate and regular rhythm. Heart sounds: No murmur heard. No friction rub. No gallop. Pulmonary:      Effort: Pulmonary effort is normal.      Breath sounds: Normal breath sounds. No wheezing or rales. Abdominal:      General: There is no distension. Palpations: Abdomen is soft. There is no mass. Tenderness: There is no abdominal tenderness. There is no rebound. Musculoskeletal:         General: Normal range of motion. Cervical back: Neck supple. Lymphadenopathy:      Cervical: No cervical adenopathy. Skin:     General: Skin is warm and dry. Findings: No rash. Neurological:      Mental Status: She is alert and oriented to person, place, and time. Cranial Nerves: No cranial nerve deficit (grossly). Psychiatric:         Thought Content:  Thought content normal.      /78 (Site: Left Upper Arm, Position: Sitting, Cuff Size: Large Adult)   Pulse 65   Resp 16   Ht 5' 5\" (1.651 m)   Wt 152 lb (68.9 kg)   LMP 08/15/1983 (Exact Date)   BMI 25.29 kg/m² Assessment:       Diagnosis Orders   1. General medical exam        2. Insomnia, unspecified type  zolpidem (AMBIEN) 5 MG tablet      3. Medicare annual wellness visit, subsequent        4. Other hyperlipidemia        5. Primary hypertension        6. Chronic pain of left knee        Reviewed multiple test results for this appointment. 4/20/2022 normal magnesium 2.4    4/20/2022 okay glucose at 102    7/14/2021 normal total cholesterol 189    Patient is recovering from previous left knee surgery otherwise she is doing fine. We told her to continue Lipitor. Plan:       Return in about 6 months (around 2/28/2023) for Hypertension, Hyperlipidemia. No orders of the defined types were placed in this encounter. Orders Placed This Encounter   Medications    zolpidem (AMBIEN) 5 MG tablet     Sig: Take 1 tablet by mouth nightly as needed for Sleep for up to 60 days. Dispense:  30 tablet     Refill:  2         Patientgiven educational materials - see patient instructions. Discussed use, benefit,and side effects of prescribed medications. All patient questions answered. Ptvoiced understanding. Reviewed health maintenance. Instructed to continue currentmedications, diet and exercise. Patient agreed with treatment plan. Follow up asdirected.      Electronically signed by Dm Sellers MD on 8/30/2022 at 10:17 AM

## 2022-08-30 NOTE — PROGRESS NOTES
Medicare Annual Wellness Visit    Jose Hearn is here for Medicare AWV (6 month), Hypertension, Hyperlipidemia, and Knee Pain (Chronic left)    Assessment & Plan   General medical exam  Insomnia, unspecified type  -     zolpidem (AMBIEN) 5 MG tablet; Take 1 tablet by mouth nightly as needed for Sleep for up to 60 days. , Disp-30 tablet, R-2Normal  Medicare annual wellness visit, subsequent  Other hyperlipidemia  Primary hypertension  Chronic pain of left knee    Recommendations for Preventive Services Due: see orders and patient instructions/AVS.  Recommended screening schedule for the next 5-10 years is provided to the patient in written form: see Patient Instructions/AVS.     Return in about 6 months (around 2/28/2023) for Hypertension, Hyperlipidemia. Subjective       Patient's complete Health Risk Assessment and screening values have been reviewed and are found in Flowsheets. The following problems were reviewed today and where indicated follow up appointments were made and/or referrals ordered.     Positive Risk Factor Screenings with Interventions:             General Health and ACP:  General  In general, how would you say your health is?: Very Good  In the past 7 days, have you experienced any of the following: New or Increased Pain, New or Increased Fatigue, Loneliness, Social Isolation, Stress or Anger?: No  Do you get the social and emotional support that you need?: Yes  Do you have a Living Will?: Yes    Advance Directives       Power of  Living Will ACP-Advance Directive ACP-Power of     Not on File Not on File Not on File Not on File        General Health Risk Interventions:  na     Hearing/Vision:  Do you or your family notice any trouble with your hearing that hasn't been managed with hearing aids?: No  Do you have difficulty driving, watching TV, or doing any of your daily activities because of your eyesight?: No  Have you had an eye exam within the past year?: (!) No  No results found.  Hearing/Vision Interventions:  Vision concerns:  patient declines any further evaluation/treatment for this issue  . Objective   Vitals:    08/30/22 0954   BP: 122/78   Site: Left Upper Arm   Position: Sitting   Cuff Size: Large Adult   Pulse: 65   Resp: 16   Weight: 152 lb (68.9 kg)   Height: 5' 5\" (1.651 m)      Body mass index is 25.29 kg/m². Allergies   Allergen Reactions    Levaquin [Levofloxacin In D5w] Other (See Comments)     Attacked muscles and tendons    Iodine Other (See Comments)     Burns       Penicillins Other (See Comments)     abcess at site of injection     Prior to Visit Medications    Medication Sig Taking? Authorizing Provider   zolpidem (AMBIEN) 5 MG tablet Take 1 tablet by mouth nightly as needed for Sleep for up to 60 days.  Yes Luis Roach MD   furosemide (LASIX) 20 MG tablet TAKE 1 TABLET DAILY  Luis Roach MD   amLODIPine (NORVASC) 10 MG tablet TAKE 1 TABLET DAILY  Luis Roach MD   levothyroxine (SYNTHROID) 50 MCG tablet TAKE 1 TABLET DAILY  Luis Roach MD   atorvastatin (LIPITOR) 40 MG tablet TAKE 1 TABLET DAILY  Luis Roach MD   metoprolol tartrate (LOPRESSOR) 25 MG tablet TAKE 1 TABLET TWICE A DAY  Luis Roach MD   oxybutynin (DITROPAN-XL) 10 MG extended release tablet TAKE 1 TABLET DAILY  Luis Roach MD   Handicap Placard MISC by Does not apply route DISABLED PARKING PERMIT AUTHORIZATION  Routine     Qty-1    The patient has the following condition(s) which qualifies him/her for disabled parking: Walking severely limited due to orthopedic condition     Privilege Duration: Temporary for 3 months  Larry Tiwari PA-C   Methylsulfonylmethane (MSM PO) Take by mouth daily  Historical Provider, MD   Calcium Carb-Cholecalciferol (CALCIUM 600+D3 PO) Take by mouth 2 times daily  Historical Provider, MD   Multiple Vitamins-Minerals (MULTIVITAMIN PO) Take 2 tablets by mouth every morning   Historical Provider, MD [facility] (Including outside providers/suppliers regularly involved in providing care):   Patient Care Team:  Yany Poe MD as PCP - General (Internal Medicine)  Yany Poe MD as PCP - REHABILITATION [facility] Empaneled Provider  Oleg Christopher MD as Consulting Physician (Pulmonology)  Domenico Barnhart MD (Physical Medicine and Rehab)  Lisa Zaidi MD as Surgeon (Orthopedic Surgery)  Primitivo Restrepo DO as Referring Physician (Sports Medicine)     Reviewed and updated this visit:  Tobacco  Allergies  Meds  Problems  Med Hx  Surg Hx  Soc Hx  Fam Hx                 I, Dr. Maryann Tariq, directly supervised the performance of this Medicare annual wellness visit.

## 2022-09-12 ENCOUNTER — HOSPITAL ENCOUNTER (OUTPATIENT)
Dept: LAB | Age: 77
Discharge: HOME | End: 2022-09-12
Payer: MEDICARE

## 2022-09-12 DIAGNOSIS — E78.49 OTHER HYPERLIPIDEMIA: ICD-10-CM

## 2022-09-12 DIAGNOSIS — E03.9 HYPOTHYROIDISM (ACQUIRED): ICD-10-CM

## 2022-09-12 DIAGNOSIS — I10 PRIMARY HYPERTENSION: ICD-10-CM

## 2022-09-12 LAB
ALBUMIN SERPL-MCNC: 4.1 G/DL (ref 3.5–5.2)
ALBUMIN/GLOBULIN RATIO: 1.5 (ref 1–2.5)
ALP BLD-CCNC: 96 U/L (ref 35–104)
ALT SERPL-CCNC: 23 U/L (ref 5–33)
ANION GAP SERPL CALCULATED.3IONS-SCNC: 9 MMOL/L (ref 9–17)
AST SERPL-CCNC: 19 U/L
BILIRUB SERPL-MCNC: 0.5 MG/DL (ref 0.3–1.2)
BUN BLDV-MCNC: 19 MG/DL (ref 8–23)
BUN/CREAT BLD: 23 (ref 9–20)
CALCIUM SERPL-MCNC: 9.4 MG/DL (ref 8.6–10.4)
CHLORIDE BLD-SCNC: 102 MMOL/L (ref 98–107)
CHOLESTEROL/HDL RATIO: 2.6
CHOLESTEROL: 172 MG/DL
CO2: 31 MMOL/L (ref 20–31)
CREAT SERPL-MCNC: 0.83 MG/DL (ref 0.5–0.9)
GFR AFRICAN AMERICAN: >60 ML/MIN
GFR NON-AFRICAN AMERICAN: >60 ML/MIN
GFR SERPL CREATININE-BSD FRML MDRD: ABNORMAL ML/MIN/{1.73_M2}
GLUCOSE FASTING: 87 MG/DL (ref 70–99)
HDLC SERPL-MCNC: 67 MG/DL
LDL CHOLESTEROL: 80 MG/DL (ref 0–130)
POTASSIUM SERPL-SCNC: 3.7 MMOL/L (ref 3.7–5.3)
SODIUM BLD-SCNC: 142 MMOL/L (ref 135–144)
THYROXINE, FREE: 1.43 NG/DL (ref 0.93–1.7)
TOTAL PROTEIN: 6.8 G/DL (ref 6.4–8.3)
TRIGL SERPL-MCNC: 126 MG/DL
TSH SERPL DL<=0.05 MIU/L-ACNC: 1.54 UIU/ML (ref 0.3–5)

## 2022-09-12 PROCEDURE — 84443 ASSAY THYROID STIM HORMONE: CPT

## 2022-09-12 PROCEDURE — 80053 COMPREHEN METABOLIC PANEL: CPT

## 2022-09-12 PROCEDURE — 36415 COLL VENOUS BLD VENIPUNCTURE: CPT

## 2022-09-12 PROCEDURE — 80061 LIPID PANEL: CPT

## 2022-09-12 PROCEDURE — 84439 ASSAY OF FREE THYROXINE: CPT

## 2022-10-07 ENCOUNTER — OFFICE VISIT (OUTPATIENT)
Dept: PRIMARY CARE CLINIC | Age: 77
End: 2022-10-07
Payer: MEDICARE

## 2022-10-07 VITALS
BODY MASS INDEX: 25.89 KG/M2 | WEIGHT: 155.38 LBS | OXYGEN SATURATION: 99 % | SYSTOLIC BLOOD PRESSURE: 126 MMHG | RESPIRATION RATE: 18 BRPM | HEART RATE: 61 BPM | TEMPERATURE: 97.2 F | HEIGHT: 65 IN | DIASTOLIC BLOOD PRESSURE: 72 MMHG

## 2022-10-07 DIAGNOSIS — R05.1 ACUTE COUGH: ICD-10-CM

## 2022-10-07 DIAGNOSIS — J40 BRONCHITIS: Primary | ICD-10-CM

## 2022-10-07 PROCEDURE — 1123F ACP DISCUSS/DSCN MKR DOCD: CPT | Performed by: NURSE PRACTITIONER

## 2022-10-07 PROCEDURE — 99212 OFFICE O/P EST SF 10 MIN: CPT | Performed by: NURSE PRACTITIONER

## 2022-10-07 PROCEDURE — 99213 OFFICE O/P EST LOW 20 MIN: CPT | Performed by: NURSE PRACTITIONER

## 2022-10-07 RX ORDER — BENZONATATE 100 MG/1
100 CAPSULE ORAL 3 TIMES DAILY PRN
Qty: 30 CAPSULE | Refills: 0 | Status: SHIPPED | OUTPATIENT
Start: 2022-10-07 | End: 2022-10-17

## 2022-10-07 RX ORDER — CEFUROXIME AXETIL 500 MG/1
500 TABLET ORAL 2 TIMES DAILY
Qty: 14 TABLET | Refills: 0 | Status: SHIPPED | OUTPATIENT
Start: 2022-10-07 | End: 2022-10-14

## 2022-10-07 ASSESSMENT — ENCOUNTER SYMPTOMS
SHORTNESS OF BREATH: 0
CHEST TIGHTNESS: 0
SINUS PRESSURE: 0
COUGH: 1
RHINORRHEA: 0
GASTROINTESTINAL NEGATIVE: 1
WHEEZING: 0

## 2022-10-07 NOTE — PROGRESS NOTES
[facility]             [address], [address]                        Telephone [phone]             Fax [phone]     Essence Panchal  1945  YPN:[id number]   Date of visit:  10/7/2022    Subjective:    Essence Panchal is a 76 y.o.  female who presents to [facility] today (10/7/2022) for evaluation of:    Chief Complaint   Patient presents with    Chest Congestion     Started 4-5 days ago, started with a scratchy throat. Cough. Using otc meds, not getting better. @ home covid test two days ago that was negative. Cough  This is a new problem. The current episode started in the past 7 days (X 5 days). The problem has been gradually worsening. The problem occurs every few minutes. The cough is Non-productive. Associated symptoms include postnasal drip. Pertinent negatives include no chest pain, fever, headaches, nasal congestion, rash, rhinorrhea, shortness of breath or wheezing. Nothing aggravates the symptoms. Treatments tried: mucinex, coricidin HBP. The treatment provided no relief. Negative home Covid-19 test 2 days ago.       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    History of atypical mycobacterial infection    Chronic radicular lumbar pain    Right knee pain    Spastic bladder    Chronic lower back pain    Chronic pain of both knees    S/P total knee replacement, right    Primary osteoarthritis of right knee    Anemia    Abnormal ECG    PVC's (premature ventricular contractions)    DELVALLE (dyspnea on exertion)    Other hyperlipidemia        Current medications are:  Current Outpatient Medications   Medication Sig Dispense Refill    cefUROXime (CEFTIN) 500 MG tablet Take 1 tablet by mouth 2 times daily for 7 days 14 tablet 0    benzonatate (TESSALON) 100 MG capsule Take 1 capsule by mouth 3 times daily as needed for Cough 30 capsule 0    zolpidem (AMBIEN) 5 MG tablet Take 1 tablet by mouth nightly as needed for Sleep for up to 60 days. 30 tablet 2    furosemide (LASIX) 20 MG tablet TAKE 1 TABLET DAILY 90 tablet 3    amLODIPine (NORVASC) 10 MG tablet TAKE 1 TABLET DAILY 90 tablet 3    levothyroxine (SYNTHROID) 50 MCG tablet TAKE 1 TABLET DAILY 90 tablet 3    atorvastatin (LIPITOR) 40 MG tablet TAKE 1 TABLET DAILY 90 tablet 3    metoprolol tartrate (LOPRESSOR) 25 MG tablet TAKE 1 TABLET TWICE A  tablet 3    oxybutynin (DITROPAN-XL) 10 MG extended release tablet TAKE 1 TABLET DAILY 90 tablet 3    Handicap Placard MISC by Does not apply route DISABLED PARKING PERMIT AUTHORIZATION  Routine     Qty-1    The patient has the following condition(s) which qualifies him/her for disabled parking: Walking severely limited due to orthopedic condition     Privilege Duration: Temporary for 3 months 1 each 0    Methylsulfonylmethane (MSM PO) Take by mouth daily      Calcium Carb-Cholecalciferol (CALCIUM 600+D3 PO) Take by mouth 2 times daily      Multiple Vitamins-Minerals (MULTIVITAMIN PO) Take 2 tablets by mouth every morning        Current Facility-Administered Medications   Medication Dose Route Frequency Provider Last Rate Last Admin    lidocaine 1 % injection 4 mL  4 mL IntraDERmal Once Ty Mandujano PA-C            She is allergic to levaquin [levofloxacin in d5w], iodine, and penicillins. .    She  reports that she quit smoking about 62 years ago. Her smoking use included cigarettes. She has a 3.00 pack-year smoking history.  She has never used smokeless tobacco.      Objective:    Vitals:    10/07/22 1334   BP: 126/72   Site: Right Upper Arm   Position: Sitting   Cuff Size: Medium Adult   Pulse: 61   Resp: 18   Temp: 97.2 °F (36.2 °C)   TempSrc: Tympanic   SpO2: 99% Weight: 155 lb 6 oz (70.5 kg)   Height: 5' 5\" (1.651 m)     Body mass index is 25.86 kg/m². Review of Systems   Constitutional: Negative. Negative for fever. HENT:  Positive for congestion and postnasal drip. Negative for rhinorrhea and sinus pressure. Respiratory:  Positive for cough. Negative for chest tightness, shortness of breath and wheezing. Cardiovascular: Negative. Negative for chest pain. Gastrointestinal: Negative. Skin:  Negative for rash. Neurological:  Negative for headaches. Physical Exam  Vitals and nursing note reviewed. Constitutional:       Appearance: Normal appearance. She is well-developed. HENT:      Head: Normocephalic. Jaw: There is normal jaw occlusion. Right Ear: Tympanic membrane, ear canal and external ear normal.      Left Ear: Tympanic membrane, ear canal and external ear normal.      Nose:      Right Turbinates: Swollen. Left Turbinates: Swollen. Right Sinus: No maxillary sinus tenderness or frontal sinus tenderness. Left Sinus: No maxillary sinus tenderness or frontal sinus tenderness. Mouth/Throat:      Lips: Pink. Mouth: Mucous membranes are moist.      Pharynx: Oropharynx is clear. Uvula midline. Posterior oropharyngeal erythema (light) present. Eyes:      Pupils: Pupils are equal, round, and reactive to light. Cardiovascular:      Rate and Rhythm: Normal rate and regular rhythm. Heart sounds: Normal heart sounds. Pulmonary:      Effort: Pulmonary effort is normal.      Breath sounds: Normal breath sounds and air entry. Comments: Dry cough noted  Musculoskeletal:      Cervical back: Normal range of motion and neck supple. Lymphadenopathy:      Cervical: No cervical adenopathy. Skin:     General: Skin is warm and dry. Neurological:      General: No focal deficit present. Mental Status: She is alert and oriented to person, place, and time.    Psychiatric:         Behavior: Behavior normal. Thought Content: Thought content normal.       Assessment and Plan:    No results found for this visit on 10/07/22. Diagnosis Orders   1. Bronchitis  cefUROXime (CEFTIN) 500 MG tablet      2. Acute cough  benzonatate (TESSALON) 100 MG capsule        Complete full course of antibiotic. Take Tessalon as directed for cough. she was also encouraged to use tylenol for pain/fever. Increase water intake. Use cool mist humidifier at bedtime. Good hand hygiene. she was instructed to return if there is no improvement or symptoms worsen. The use, risks, benefits, and side effects of prescribed or recommended medications were discussed. All questions were answered and the patient/caregiver voiced understanding. No orders of the defined types were placed in this encounter.         Electronically signed by ASTRID Mark CNP on 10/7/22 at 1:39 PM EDT

## 2022-10-26 ENCOUNTER — OFFICE VISIT (OUTPATIENT)
Dept: CARDIOLOGY | Age: 77
End: 2022-10-26
Payer: MEDICARE

## 2022-10-26 VITALS
HEIGHT: 65 IN | DIASTOLIC BLOOD PRESSURE: 72 MMHG | SYSTOLIC BLOOD PRESSURE: 128 MMHG | WEIGHT: 154.8 LBS | BODY MASS INDEX: 25.79 KG/M2 | HEART RATE: 75 BPM

## 2022-10-26 DIAGNOSIS — I10 PRIMARY HYPERTENSION: Primary | ICD-10-CM

## 2022-10-26 DIAGNOSIS — I49.3 PVC'S (PREMATURE VENTRICULAR CONTRACTIONS): ICD-10-CM

## 2022-10-26 DIAGNOSIS — E78.5 HYPERLIPIDEMIA, UNSPECIFIED HYPERLIPIDEMIA TYPE: ICD-10-CM

## 2022-10-26 PROCEDURE — 99214 OFFICE O/P EST MOD 30 MIN: CPT | Performed by: INTERNAL MEDICINE

## 2022-10-26 PROCEDURE — 3074F SYST BP LT 130 MM HG: CPT | Performed by: INTERNAL MEDICINE

## 2022-10-26 PROCEDURE — 93010 ELECTROCARDIOGRAM REPORT: CPT | Performed by: INTERNAL MEDICINE

## 2022-10-26 PROCEDURE — 1123F ACP DISCUSS/DSCN MKR DOCD: CPT | Performed by: INTERNAL MEDICINE

## 2022-10-26 PROCEDURE — 93005 ELECTROCARDIOGRAM TRACING: CPT | Performed by: INTERNAL MEDICINE

## 2022-10-26 PROCEDURE — 3078F DIAST BP <80 MM HG: CPT | Performed by: INTERNAL MEDICINE

## 2022-10-26 PROCEDURE — 99212 OFFICE O/P EST SF 10 MIN: CPT | Performed by: INTERNAL MEDICINE

## 2022-10-26 NOTE — PROGRESS NOTES
Today's Date: 10/26/2022  Patient's Name: Mor Sanders  Patient's age: 76 y.o., 1945    Subjective:  Mor Sanders is being seen in clinic today regarding HTN, HL, PVC      she is here for follow up. Recently she had sore throat and cough. She denies any chest pain or dyspnea. No PND, no syncope or pre-syncope, no orthopnea. She had left knee replacement. She reports numbness in left knee. She is unable to go up stairs due to left knee issues        Past Medical History:   has a past medical history of Bacillus fragilis infection, Bronchiectasis ([address] Chronic lower back pain, Chronic radicular lumbar pain, Degenerative disc disease, Hyperlipidemia, Hyperreflexic bladder, Hypertension, Hypothyroidism, and Primary osteoarthritis of right knee. Past Surgical History:   has a past surgical history that includes Hysterectomy (1983); bronchoscopy (Left, 6/19/2012); other surgical history (Right, 4/21/11); Breast surgery (Left, 2012); Colonoscopy (9/13); Tonsillectomy; other surgical history (Right, 10/2/15); Total knee arthroplasty (Right, 12/19/2017); and pr total knee arthroplasty (Right, 12/19/2017). Home Medications:  Prior to Admission medications    Medication Sig Start Date End Date Taking? Authorizing Provider   zolpidem (AMBIEN) 5 MG tablet Take 1 tablet by mouth nightly as needed for Sleep for up to 60 days.  8/30/22 10/29/22  Rachelle Allison MD   furosemide (LASIX) 20 MG tablet TAKE 1 TABLET DAILY 7/25/22   Rachelle Allison MD   amLODIPine (NORVASC) 10 MG tablet TAKE 1 TABLET DAILY 5/26/22   Rachelle Allison MD   levothyroxine (SYNTHROID) 50 MCG tablet TAKE 1 TABLET DAILY 5/19/22   Rachelle Allison MD   atorvastatin (LIPITOR) 40 MG tablet TAKE 1 TABLET DAILY 2/10/22   Rachelle Allison MD   metoprolol tartrate (LOPRESSOR) 25 MG tablet TAKE 1 TABLET TWICE A DAY 1/10/22   Rachelle Allison MD   oxybutynin (DITROPAN-XL) 10 MG extended release tablet TAKE 1 TABLET DAILY 1/10/22   Jamar MD Tarun   Handicap Placard MISC by Does not apply route DISABLED PARKING PERMIT AUTHORIZATION  Routine     Qty-1    The patient has the following condition(s) which qualifies him/her for disabled parking: Walking severely limited due to orthopedic condition     Privilege Duration: Temporary for 3 months 3/17/21   Laverne France PA-C   Methylsulfonylmethane (MSM PO) Take by mouth daily    Historical Provider, MD   Calcium Carb-Cholecalciferol (CALCIUM 600+D3 PO) Take by mouth 2 times daily    Historical Provider, MD   Multiple Vitamins-Minerals (MULTIVITAMIN PO) Take 2 tablets by mouth every morning     Historical Provider, MD       Allergies:  Levaquin [levofloxacin in d5w], Iodine, and Penicillins    Social History:   reports that she quit smoking about 62 years ago. Her smoking use included cigarettes. She has a 3.00 pack-year smoking history. She has never used smokeless tobacco. She reports that she does not drink alcohol and does not use drugs. Family History: family history includes No Known Problems in her father; Other in her mother. No h/o sudden cardiac death. No for premature CAD    REVIEW OF SYSTEMS:    Constitutional: there has been no unanticipated weight loss. There's been No change in energy level, No change in activity level. Eyes: No visual changes or diplopia. No scleral icterus. ENT: No Headaches, hearing loss or vertigo. No mouth sores or sore throat. Cardiovascular: see above  Respiratory: see above  Gastrointestinal: No abdominal pain, appetite loss, blood in stools. Genitourinary: No dysuria, trouble voiding, or hematuria. Musculoskeletal:  see above  Integumentary: No rash or pruritis. Neurological: No headache or diplopia. No tingling  Psychiatric: No anxiety, or depression. Endocrine: No temperature intolerance. Hematologic/Lymphatic: No abnormal bruising or bleeding, blood clots or swollen lymph nodes.   Allergic/Immunologic: No nasal congestion or hives.      PHYSICAL EXAM:      Vitals:    10/26/22 1112   BP: 128/72   Pulse: 75       Constitutional and General Appearance: alert, cooperative, no distress and appears stated age  HEENT: PERRL, no cervical lymphadenopathy. No masses palpable. Normal oral mucosa  Respiratory:  Normal excursion and expansion without use of accessory muscles  Resp Auscultation: Good respiratory effort. No for increased work of breathing. On auscultation: clear to auscultation bilaterally  Cardiovascular:  Heart tones are crisp and normal. regular S1 and S2.  Jugular venous pulsation Normal  The carotid upstroke is normal in amplitude and contour without delay or bruit   Abdomen:   soft  Bowel sounds present  Extremities:   No edema  Neurological:  Alert and oriented. Cardiac Data:  EKG: SR, PVC, Poor R wave progression    Labs:     CBC: No results for input(s): WBC, HGB, HCT, PLT in the last 72 hours. BMP: No results for input(s): NA, K, CO2, BUN, CREATININE, LABGLOM, GLUCOSE in the last 72 hours. PT/INR: No results for input(s): PROTIME, INR in the last 72 hours. FASTING LIPID PANEL:  Lab Results   Component Value Date/Time    HDL 67 09/12/2022 10:24 AM    TRIG 126 09/12/2022 10:24 AM     LIVER PROFILE:No results for input(s): AST, ALT, LABALBU in the last 72 hours. 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   No ischemia or infarction. 2.  Normal LVEF = 60%. 3.  No wall motion abnormalities. Echo 12/11/2017   1. Left ventricular dimensions are within normal limits. LV ejection fraction is 50% to 55%. 2.  Grade 1 diastolic dysfunction. 3.  Normal right ventricular systolic function. 4.  Focal thickening of mitral valve noted, mild-to-moderate mitral regurgitation. 5.  Mild tricuspid regurgitation. Right ventricular systolic pressure 35 mmHg. 6.  No pericardial effusion. Nuclear stress test 4/27/22  IMPRESSION:  1. No ischemia or infarction. 2.  Normal LV systolic function; LVEF 28%. TTE 4/27/22  Summary  Normal left ventricular diameter. Left ventricular systolic function is normal.  Left ventricular ejection fraction 60 to 65 %. Grade III (severe) left ventricular diastolic dysfunction. Left atrium is mildly dilated. Right atrial dilatation. Right ventricular dilatation with normal systolic function. Aortic valve sclerosis without stenosis. Mild mitral valve thickening with annular calcification. Mild to moderate mitral regurgitation. Normal tricuspid valve leaflets. Mild tricuspid regurgitation. Estimated right ventricular systolic pressure is 42 mmHg. Mild pulmonary hypertension. No significant pericardial effusion is seen      Assessment and plan:     -Abnormal ECG. Nuclear stress test negative for ischemia 4/27/22. Stable  -PVCs- continue metoprolol. Normal LVEF on TTE 4/27/22.   -Leg edema- Lasix 20mg po qday. Compression stocking. Stable  -Normal stress test 12/11/17 with normal LVEF on TTE 12/11/17. -HTN- Continue metoprolol and norvasc. Monitor BP at home. Stable. -Hyperlipidemia- continue statin. LDL 80 on 9/12/22. Improved. -Hypothyroidism  -S/p R TKA- winter 2018  - S/p L TKA 5/2022  -RTC 6 months.     Summer Hoang [address] [facility]  [phone]

## 2022-10-31 ENCOUNTER — OFFICE VISIT (OUTPATIENT)
Dept: INTERNAL MEDICINE | Age: 77
End: 2022-10-31
Payer: MEDICARE

## 2022-10-31 ENCOUNTER — HOSPITAL ENCOUNTER (OUTPATIENT)
Dept: GENERAL RADIOLOGY | Age: 77
Discharge: HOME | End: 2022-11-02
Payer: MEDICARE

## 2022-10-31 VITALS
HEART RATE: 69 BPM | OXYGEN SATURATION: 98 % | SYSTOLIC BLOOD PRESSURE: 118 MMHG | WEIGHT: 154 LBS | RESPIRATION RATE: 16 BRPM | DIASTOLIC BLOOD PRESSURE: 68 MMHG | BODY MASS INDEX: 25.66 KG/M2 | HEIGHT: 65 IN

## 2022-10-31 DIAGNOSIS — R05.2 SUBACUTE COUGH: ICD-10-CM

## 2022-10-31 DIAGNOSIS — I10 PRIMARY HYPERTENSION: Primary | ICD-10-CM

## 2022-10-31 DIAGNOSIS — E78.49 OTHER HYPERLIPIDEMIA: ICD-10-CM

## 2022-10-31 DIAGNOSIS — J20.9 ACUTE BRONCHITIS, UNSPECIFIED ORGANISM: ICD-10-CM

## 2022-10-31 PROCEDURE — 3078F DIAST BP <80 MM HG: CPT | Performed by: INTERNAL MEDICINE

## 2022-10-31 PROCEDURE — 71046 X-RAY EXAM CHEST 2 VIEWS: CPT

## 2022-10-31 PROCEDURE — 99214 OFFICE O/P EST MOD 30 MIN: CPT | Performed by: INTERNAL MEDICINE

## 2022-10-31 PROCEDURE — 3074F SYST BP LT 130 MM HG: CPT | Performed by: INTERNAL MEDICINE

## 2022-10-31 PROCEDURE — 1123F ACP DISCUSS/DSCN MKR DOCD: CPT | Performed by: INTERNAL MEDICINE

## 2022-10-31 RX ORDER — AZITHROMYCIN 250 MG/1
250 TABLET, FILM COATED ORAL SEE ADMIN INSTRUCTIONS
Qty: 6 TABLET | Refills: 0 | Status: SHIPPED | OUTPATIENT
Start: 2022-10-31 | End: 2022-11-05

## 2022-10-31 ASSESSMENT — ENCOUNTER SYMPTOMS
COUGH: 1
BLOOD IN STOOL: 0
DIARRHEA: 0
EYE PAIN: 0
NAUSEA: 0
CONSTIPATION: 0
BACK PAIN: 0
SHORTNESS OF BREATH: 0
VOMITING: 0
ABDOMINAL PAIN: 0

## 2022-10-31 NOTE — PROGRESS NOTES
[facility]  [address] [place] [address]  Dept: [phone]  Dept Fax: [phone]  Loc: [phone]     Sherrie Hyman is a 76 y.o. female who presents today for her medical conditions/complaintsas noted below. Sherrie Hyman is c/o of   Chief Complaint   Patient presents with    Hypertension     Urgent care follow up for Bronchitis. Was seen here at [facility] on 10/7/2022. Still has on going cough from the bronchitis    Hyperlipidemia    Knee Pain     Chronic Right         HPI:     Hypertension  This is a chronic problem. The current episode started more than 1 year ago. The problem has been waxing and waning since onset. The problem is controlled. Pertinent negatives include no chest pain, headaches, neck pain, palpitations or shortness of breath. Hyperlipidemia  This is a chronic problem. The current episode started more than 1 year ago. The problem is controlled. Recent lipid tests were reviewed and are variable. Pertinent negatives include no chest pain or shortness of breath. Cough  This is a new problem. The current episode started 1 to 4 weeks ago. The problem has been waxing and waning. The problem occurs every few hours. Pertinent negatives include no chest pain, chills, fever, headaches, rash or shortness of breath.      No results found for: LABA1C         No results found for: LABMICR  LDL Cholesterol (mg/dL)   Date Value   09/12/2022 80   07/14/2021 104   07/15/2020 82         AST (U/L)   Date Value   09/12/2022 19     ALT (U/L)   Date Value   09/12/2022 23     BUN (mg/dL)   Date Value   09/12/2022 19     BP Readings from Last 3 Encounters:   10/31/22 118/68   10/26/22 128/72   10/07/22 126/72              Past Medical History:   Diagnosis Date    Bacillus fragilis infection     treated one year Dr Jan Skaggs    Bronchiectasis ([facility]     Chronic lower back pain 2/5/2016    Chronic radicular lumbar pain 10/2/2015 Degenerative disc disease     Hyperlipidemia     Hyperreflexic bladder     Hypertension     Hypothyroidism     Primary osteoarthritis of right knee 2017      Past Surgical History:   Procedure Laterality Date    BREAST SURGERY Left     core biopsy    BRONCHOSCOPY Left 2012    COLONOSCOPY      SIS 10 y    HYSTERECTOMY (CERVIX STATUS UNKNOWN)      and BSO    OTHER SURGICAL HISTORY Right 11    right L4 Transforaminal epidural steroid injection    OTHER SURGICAL HISTORY Right 10/2/15    right L4 TFE    NE TOTAL KNEE ARTHROPLASTY Right 2017    KNEE TOTAL ARTHROPLASTY RIGHT WITH WOO & NEPHEW  AQUAMANTIS   PLATELET GEL performed by Lino Capellan DO at [address] Right 2017       Family History   Problem Relation Age of Onset    Other Mother         Knee pain    No Known Problems Father           Social History     Tobacco Use    Smoking status: Former     Packs/day: 0.50     Years: 6.00     Pack years: 3.00     Types: Cigarettes     Quit date: 1960     Years since quittin.4    Smokeless tobacco: Never    Tobacco comments:     quit 35-40 years ago   Substance Use Topics    Alcohol use: No     Alcohol/week: 0.0 standard drinks         Current Outpatient Medications   Medication Sig Dispense Refill    azithromycin (ZITHROMAX) 250 MG tablet Take 1 tablet by mouth See Admin Instructions for 5 days 500mg on day 1 followed by 250mg on days 2 - 5 6 tablet 0    furosemide (LASIX) 20 MG tablet TAKE 1 TABLET DAILY 90 tablet 3    amLODIPine (NORVASC) 10 MG tablet TAKE 1 TABLET DAILY 90 tablet 3    levothyroxine (SYNTHROID) 50 MCG tablet TAKE 1 TABLET DAILY 90 tablet 3    atorvastatin (LIPITOR) 40 MG tablet TAKE 1 TABLET DAILY 90 tablet 3    metoprolol tartrate (LOPRESSOR) 25 MG tablet TAKE 1 TABLET TWICE A  tablet 3    oxybutynin (DITROPAN-XL) 10 MG extended release tablet TAKE 1 TABLET DAILY 90 tablet 3    Handicap Placard MISC by Does not apply route DISABLED PARKING PERMIT AUTHORIZATION  Routine     Qty-1    The patient has the following condition(s) which qualifies him/her for disabled parking: Walking severely limited due to orthopedic condition     Privilege Duration: Temporary for 3 months 1 each 0    Calcium Carb-Cholecalciferol (CALCIUM 600+D3 PO) Take by mouth 2 times daily      Multiple Vitamins-Minerals (MULTIVITAMIN PO) Take 2 tablets by mouth every morning        Current Facility-Administered Medications   Medication Dose Route Frequency Provider Last Rate Last Admin    lidocaine 1 % injection 4 mL  4 mL IntraDERmal Once Eliceo Lambert PA-C         Allergies   Allergen Reactions    Levaquin [Levofloxacin In D5w] Other (See Comments)     Attacked muscles and tendons    Iodine Other (See Comments)     Burns       Penicillins Other (See Comments)     abcess at site of injection       Health Maintenance   Topic Date Due    Hepatitis C screen  Never done    DTaP/Tdap/Td vaccine (1 - Tdap) Never done    Shingles vaccine (3 of 3) 12/15/2020    COVID-19 Vaccine (2 - Moderna series) 03/08/2021    Flu vaccine (1) 08/01/2022    Depression Screen  08/30/2023    Annual Wellness Visit (AWV)  08/31/2023    Lipids  09/12/2023    Pneumococcal 65+ years Vaccine  Completed    Hepatitis A vaccine  Aged Out    Hib vaccine  Aged Out    Meningococcal (ACWY) vaccine  Aged Out       Subjective:      Review of Systems   Constitutional:  Negative for chills and fever. HENT:  Negative for hearing loss. Eyes:  Negative for pain and visual disturbance. Respiratory:  Positive for cough. Negative for shortness of breath. Cardiovascular:  Negative for chest pain, palpitations and leg swelling. Gastrointestinal:  Negative for abdominal pain, blood in stool, constipation, diarrhea, nausea and vomiting. Endocrine: Negative for cold intolerance, polydipsia and polyuria. Genitourinary:  Negative for difficulty urinating, dysuria and hematuria. Musculoskeletal:  Negative for arthralgias, back pain, gait problem and neck pain. Skin:  Negative for pallor and rash. Neurological:  Negative for dizziness, weakness, numbness and headaches. Hematological:  Negative for adenopathy. Does not bruise/bleed easily. Psychiatric/Behavioral:  Negative for confusion. The patient is not nervous/anxious. Objective:     Physical Exam  Vitals reviewed. Constitutional:       Appearance: She is well-developed. HENT:      Head: Normocephalic and atraumatic. Eyes:      Pupils: Pupils are equal, round, and reactive to light. Cardiovascular:      Rate and Rhythm: Normal rate and regular rhythm. Heart sounds: No murmur heard. No friction rub. No gallop. Pulmonary:      Effort: Pulmonary effort is normal.      Breath sounds: Normal breath sounds. No wheezing or rales. Abdominal:      General: There is no distension. Palpations: Abdomen is soft. There is no mass. Tenderness: There is no abdominal tenderness. There is no rebound. Musculoskeletal:         General: Normal range of motion. Cervical back: Neck supple. Lymphadenopathy:      Cervical: No cervical adenopathy. Skin:     General: Skin is warm and dry. Findings: No rash. Neurological:      Mental Status: She is alert and oriented to person, place, and time. Cranial Nerves: No cranial nerve deficit (grossly). Psychiatric:         Thought Content: Thought content normal.      /68 (Site: Left Upper Arm, Position: Sitting, Cuff Size: Large Adult)   Pulse 69   Resp 16   Ht 5' 5\" (1.651 m)   Wt 154 lb (69.9 kg)   LMP 08/15/1983 (Exact Date)   SpO2 98%   BMI 25.63 kg/m²     Assessment:       Diagnosis Orders   1. Primary hypertension        2. Other hyperlipidemia        3. Subacute cough  XR CHEST STANDARD (2 VW)      4. Acute bronchitis, unspecified organism  azithromycin (ZITHROMAX) 250 MG tablet         Reviewed multiple test results.     9/12/2022 normal TSH 1.54    9/12/2022 normal glucose 87    9/12/2022 normal total cholesterol 172     patient given Z-Nba for the possible acute bronchitis. She was told if cough does not resolve after the antibiotics then she should call , and then we can refer her for pulmonary consult. We also are checking a chest x-ray today       Plan:       Return if symptoms worsen or fail to improve, for Hyperlipidemia, Hypertension. Orders Placed This Encounter   Procedures    XR CHEST STANDARD (2 VW)     Standing Status:   Future     Standing Expiration Date:   10/31/2023       Orders Placed This Encounter   Medications    azithromycin (ZITHROMAX) 250 MG tablet     Sig: Take 1 tablet by mouth See Admin Instructions for 5 days 500mg on day 1 followed by 250mg on days 2 - 5     Dispense:  6 tablet     Refill:  0         Patientgiven educational materials - see patient instructions. Discussed use, benefit,and side effects of prescribed medications. All patient questions answered. Ptvoiced understanding. Reviewed health maintenance. Instructed to continue currentmedications, diet and exercise. Patient agreed with treatment plan. Follow up asdirected.      Electronically signed by Franki Moser MD on 10/31/2022 at 2:21 PM

## 2023-01-04 DIAGNOSIS — I10 ESSENTIAL HYPERTENSION: ICD-10-CM

## 2023-01-04 DIAGNOSIS — N32.89 SPASTIC BLADDER: ICD-10-CM

## 2023-01-05 RX ORDER — OXYBUTYNIN CHLORIDE 10 MG/1
TABLET, EXTENDED RELEASE ORAL
Qty: 90 TABLET | Refills: 3 | Status: SHIPPED | OUTPATIENT
Start: 2023-01-05

## 2023-02-06 DIAGNOSIS — E78.49 OTHER HYPERLIPIDEMIA: ICD-10-CM

## 2023-02-06 RX ORDER — ATORVASTATIN CALCIUM 40 MG/1
TABLET, FILM COATED ORAL
Qty: 90 TABLET | Refills: 3 | Status: SHIPPED | OUTPATIENT
Start: 2023-02-06

## 2023-02-06 NOTE — TELEPHONE ENCOUNTER
Trung Vidal called requesting a refill of the below medication which has been pended for you:     Requested Prescriptions     Pending Prescriptions Disp Refills    atorvastatin (LIPITOR) 40 MG tablet [Pharmacy Med Name: ATORVASTATIN TABS 40MG] 90 tablet 3     Sig: TAKE 1 TABLET DAILY       Last Appointment Date: 10/31/2022  Next Appointment Date: 2/28/2023    Allergies   Allergen Reactions    Levaquin [Levofloxacin In D5w] Other (See Comments)     Attacked muscles and tendons    Iodine Other (See Comments)     Burns       Penicillins Other (See Comments)     abcess at site of injection

## 2023-02-28 ENCOUNTER — OFFICE VISIT (OUTPATIENT)
Dept: INTERNAL MEDICINE | Age: 78
End: 2023-02-28
Payer: MEDICARE

## 2023-02-28 VITALS
SYSTOLIC BLOOD PRESSURE: 122 MMHG | OXYGEN SATURATION: 100 % | BODY MASS INDEX: 25.49 KG/M2 | DIASTOLIC BLOOD PRESSURE: 68 MMHG | HEART RATE: 64 BPM | RESPIRATION RATE: 16 BRPM | WEIGHT: 153 LBS | HEIGHT: 65 IN

## 2023-02-28 DIAGNOSIS — M25.562 CHRONIC PAIN OF LEFT KNEE: ICD-10-CM

## 2023-02-28 DIAGNOSIS — E03.9 HYPOTHYROIDISM (ACQUIRED): ICD-10-CM

## 2023-02-28 DIAGNOSIS — I10 PRIMARY HYPERTENSION: Primary | ICD-10-CM

## 2023-02-28 DIAGNOSIS — G89.29 CHRONIC PAIN OF LEFT KNEE: ICD-10-CM

## 2023-02-28 DIAGNOSIS — E78.49 OTHER HYPERLIPIDEMIA: ICD-10-CM

## 2023-02-28 DIAGNOSIS — Z78.0 POST-MENOPAUSAL: ICD-10-CM

## 2023-02-28 PROCEDURE — 3078F DIAST BP <80 MM HG: CPT | Performed by: INTERNAL MEDICINE

## 2023-02-28 PROCEDURE — 99212 OFFICE O/P EST SF 10 MIN: CPT | Performed by: INTERNAL MEDICINE

## 2023-02-28 PROCEDURE — 99214 OFFICE O/P EST MOD 30 MIN: CPT | Performed by: INTERNAL MEDICINE

## 2023-02-28 PROCEDURE — 3074F SYST BP LT 130 MM HG: CPT | Performed by: INTERNAL MEDICINE

## 2023-02-28 PROCEDURE — 1123F ACP DISCUSS/DSCN MKR DOCD: CPT | Performed by: INTERNAL MEDICINE

## 2023-02-28 SDOH — ECONOMIC STABILITY: INCOME INSECURITY: HOW HARD IS IT FOR YOU TO PAY FOR THE VERY BASICS LIKE FOOD, HOUSING, MEDICAL CARE, AND HEATING?: NOT HARD AT ALL

## 2023-02-28 SDOH — ECONOMIC STABILITY: FOOD INSECURITY: WITHIN THE PAST 12 MONTHS, YOU WORRIED THAT YOUR FOOD WOULD RUN OUT BEFORE YOU GOT MONEY TO BUY MORE.: NEVER TRUE

## 2023-02-28 SDOH — ECONOMIC STABILITY: FOOD INSECURITY: WITHIN THE PAST 12 MONTHS, THE FOOD YOU BOUGHT JUST DIDN'T LAST AND YOU DIDN'T HAVE MONEY TO GET MORE.: NEVER TRUE

## 2023-02-28 SDOH — ECONOMIC STABILITY: HOUSING INSECURITY
IN THE LAST 12 MONTHS, WAS THERE A TIME WHEN YOU DID NOT HAVE A STEADY PLACE TO SLEEP OR SLEPT IN A SHELTER (INCLUDING NOW)?: NO

## 2023-02-28 ASSESSMENT — ENCOUNTER SYMPTOMS
NAUSEA: 0
DIARRHEA: 0
COUGH: 0
SHORTNESS OF BREATH: 0
BLOOD IN STOOL: 0
VOMITING: 0
CONSTIPATION: 0
BACK PAIN: 0
ABDOMINAL PAIN: 0
EYE PAIN: 0

## 2023-02-28 ASSESSMENT — PATIENT HEALTH QUESTIONNAIRE - PHQ9
SUM OF ALL RESPONSES TO PHQ QUESTIONS 1-9: 0
SUM OF ALL RESPONSES TO PHQ QUESTIONS 1-9: 0
1. LITTLE INTEREST OR PLEASURE IN DOING THINGS: 0
SUM OF ALL RESPONSES TO PHQ9 QUESTIONS 1 & 2: 0
SUM OF ALL RESPONSES TO PHQ QUESTIONS 1-9: 0
2. FEELING DOWN, DEPRESSED OR HOPELESS: 0
SUM OF ALL RESPONSES TO PHQ QUESTIONS 1-9: 0

## 2023-02-28 NOTE — PROGRESS NOTES
[place] [address]  Dept: [phone]  Dept Fax: [phone]  Loc: [phone]     Dania Vazquez is a 76 y.o. female who presents today for her medical conditions/complaintsas noted below. Dania Vazquez is c/o of   Chief Complaint   Patient presents with    Hypertension     6 month    Hyperlipidemia    Knee Pain     Chronic Left         HPI:     Hypertension  This is a chronic problem. The current episode started more than 1 year ago. The problem has been waxing and waning since onset. The problem is controlled. Pertinent negatives include no chest pain, headaches, neck pain, palpitations or shortness of breath. Hyperlipidemia  This is a chronic problem. The current episode started more than 1 year ago. The problem is controlled. Recent lipid tests were reviewed and are variable. Pertinent negatives include no chest pain or shortness of breath. Knee Pain   The incident occurred more than 1 week ago. The incident occurred at home. There was no injury mechanism. The pain is present in the left knee. The pain is mild. The pain has been Improving since onset. Pertinent negatives include no numbness.      No results found for: LABA1C         No results found for: LABMICR  LDL Cholesterol (mg/dL)   Date Value   09/12/2022 80   07/14/2021 104   07/15/2020 82         AST (U/L)   Date Value   09/12/2022 19     ALT (U/L)   Date Value   09/12/2022 23     BUN (mg/dL)   Date Value   09/12/2022 19     BP Readings from Last 3 Encounters:   02/28/23 122/68   10/31/22 118/68   10/26/22 128/72              Past Medical History:   Diagnosis Date    Bacillus fragilis infection     treated one year Dr Tiffanie White    [facility]     Chronic lower back pain 2/5/2016    Chronic radicular lumbar pain 10/2/2015    Degenerative disc disease     Hyperlipidemia     Hyperreflexic bladder     Hypertension Hypothyroidism     Primary osteoarthritis of right knee 2017      Past Surgical History:   Procedure Laterality Date    BREAST SURGERY Left     core biopsy    BRONCHOSCOPY Left 2012    COLONOSCOPY      SIS 10 y    HYSTERECTOMY (CERVIX STATUS UNKNOWN)      and BSO    OTHER SURGICAL HISTORY Right 11    right L4 Transforaminal epidural steroid injection    OTHER SURGICAL HISTORY Right 10/2/15    right L4 TFE    GA ARTHRP KNE CONDYLE&PLATU MEDIAL&LAT COMPARTMENTS Right 2017    KNEE TOTAL ARTHROPLASTY RIGHT WITH WOO & NEPHEW  AQUAMANTIS   PLATELET GEL performed by López Edwards DO at [address] [place] [address] Right 2017       Family History   Problem Relation Age of Onset    Other Mother         Knee pain    No Known Problems Father           Social History     Tobacco Use    Smoking status: Former     Packs/day: 0.50     Years: 6.00     Pack years: 3.00     Types: Cigarettes     Quit date: 1960     Years since quittin.8    Smokeless tobacco: Never    Tobacco comments:     quit 35-40 years ago   Substance Use Topics    Alcohol use: No     Alcohol/week: 0.0 standard drinks         Current Outpatient Medications   Medication Sig Dispense Refill    atorvastatin (LIPITOR) 40 MG tablet TAKE 1 TABLET DAILY 90 tablet 3    oxybutynin (DITROPAN-XL) 10 MG extended release tablet TAKE 1 TABLET DAILY 90 tablet 3    metoprolol tartrate (LOPRESSOR) 25 MG tablet TAKE 1 TABLET TWICE A  tablet 3    furosemide (LASIX) 20 MG tablet TAKE 1 TABLET DAILY 90 tablet 3    amLODIPine (NORVASC) 10 MG tablet TAKE 1 TABLET DAILY 90 tablet 3    levothyroxine (SYNTHROID) 50 MCG tablet TAKE 1 TABLET DAILY 90 tablet 3    Handicap Placard MISC by Does not apply route DISABLED PARKING PERMIT AUTHORIZATION  Routine     Qty-1    The patient has the following condition(s) which qualifies him/her for disabled parking: Walking severely limited due to orthopedic condition Privilege Duration: Temporary for 3 months 1 each 0    Calcium Carb-Cholecalciferol (CALCIUM 600+D3 PO) Take 2 capsules by mouth daily      Multiple Vitamins-Minerals (MULTIVITAMIN PO) Take 2 tablets by mouth every morning        Current Facility-Administered Medications   Medication Dose Route Frequency Provider Last Rate Last Admin    lidocaine 1 % injection 4 mL  4 mL IntraDERmal Once Mustapha Urias PA-C         Allergies   Allergen Reactions    Levaquin [Levofloxacin In D5w] Other (See Comments)     Attacked muscles and tendons    Iodine Other (See Comments)     Burns       Penicillins Other (See Comments)     abcess at site of injection       Health Maintenance   Topic Date Due    Hepatitis C screen  Never done    DTaP/Tdap/Td vaccine (1 - Tdap) Never done    Shingles vaccine (3 of 3) 12/15/2020    Depression Screen  08/30/2023    Annual Wellness Visit (AWV)  08/31/2023    Lipids  09/12/2023    Flu vaccine  Completed    Pneumococcal 65+ years Vaccine  Completed    COVID-19 Vaccine  Completed    Hepatitis A vaccine  Aged Out    Hib vaccine  Aged Out    Meningococcal (ACWY) vaccine  Aged Out       Subjective:      Review of Systems   Constitutional:  Negative for chills and fever. HENT:  Negative for hearing loss. Eyes:  Negative for pain and visual disturbance. Respiratory:  Negative for cough and shortness of breath. Cardiovascular:  Negative for chest pain, palpitations and leg swelling. Gastrointestinal:  Negative for abdominal pain, blood in stool, constipation, diarrhea, nausea and vomiting. Endocrine: Negative for cold intolerance, polydipsia and polyuria. Genitourinary:  Negative for difficulty urinating, dysuria and hematuria. Musculoskeletal:  Negative for arthralgias, back pain, gait problem and neck pain. Skin:  Negative for pallor and rash. Neurological:  Negative for dizziness, weakness, numbness and headaches. Hematological:  Negative for adenopathy.  Does not bruise/bleed easily. Psychiatric/Behavioral:  Negative for confusion. The patient is not nervous/anxious. Objective:     Physical Exam  Vitals reviewed. Constitutional:       Appearance: She is well-developed. HENT:      Head: Normocephalic and atraumatic. Eyes:      Pupils: Pupils are equal, round, and reactive to light. Cardiovascular:      Rate and Rhythm: Normal rate and regular rhythm. Heart sounds: No murmur heard. No friction rub. No gallop. Pulmonary:      Effort: Pulmonary effort is normal.      Breath sounds: Normal breath sounds. No wheezing or rales. Abdominal:      General: There is no distension. Palpations: Abdomen is soft. There is no mass. Tenderness: There is no abdominal tenderness. There is no rebound. Musculoskeletal:         General: Normal range of motion. Cervical back: Neck supple. Lymphadenopathy:      Cervical: No cervical adenopathy. Skin:     General: Skin is warm and dry. Findings: No rash. Neurological:      Mental Status: She is alert and oriented to person, place, and time. Cranial Nerves: No cranial nerve deficit (grossly). Psychiatric:         Thought Content: Thought content normal.      /68 (Site: Left Upper Arm, Position: Sitting, Cuff Size: Large Adult)   Pulse 64   Resp 16   Ht 5' 5\" (1.651 m)   Wt 153 lb (69.4 kg)   LMP 08/15/1983 (Exact Date)   SpO2 100%   BMI 25.46 kg/m²     Assessment:       Diagnosis Orders   1. Primary hypertension  Comprehensive Metabolic Panel      2. Other hyperlipidemia  Lipid Panel      3. Chronic pain of left knee        4. Hypothyroidism (acquired)  TSH    T4, Free      5. Post-menopausal  DEXA BONE DENSITY AXIAL SKELETON         Reviewed multiple test results. 9/12/2022 normal TSH 1.54    9/12/2022 normal creatinine at 0.83    9/12/2022 normal total cholesterol 172    Patient told to continue Lipitor and Synthroid.        Plan:       Return in about 27 weeks (around 9/5/2023) for medicare AWV, Hypertension, Hyperlipidemia. Orders Placed This Encounter   Procedures    DEXA BONE DENSITY AXIAL SKELETON     Standing Status:   Future     Standing Expiration Date:   2/28/2024    TSH     Standing Status:   Future     Standing Expiration Date:   2/28/2024    T4, Free     Standing Status:   Future     Standing Expiration Date:   2/28/2024    Comprehensive Metabolic Panel     Standing Status:   Future     Standing Expiration Date:   2/28/2024    Lipid Panel     Standing Status:   Future     Standing Expiration Date:   2/28/2024     Order Specific Question:   Is Patient Fasting?/# of Hours     Answer:   12       No orders of the defined types were placed in this encounter. Patientgiven educational materials - see patient instructions. Discussed use, benefit,and side effects of prescribed medications. All patient questions answered. Ptvoiced understanding. Reviewed health maintenance. Instructed to continue currentmedications, diet and exercise. Patient agreed with treatment plan. Follow up asdirected.      Electronically signed by Becky Ellis MD on 2/28/2023 at 10:16 AM

## 2023-03-14 ENCOUNTER — HOSPITAL ENCOUNTER (OUTPATIENT)
Dept: BONE DENSITY | Age: 78
Discharge: HOME | End: 2023-03-16
Payer: MEDICARE

## 2023-03-14 DIAGNOSIS — Z78.0 POST-MENOPAUSAL: ICD-10-CM

## 2023-03-14 PROCEDURE — 77080 DXA BONE DENSITY AXIAL: CPT

## 2023-05-15 DIAGNOSIS — E03.9 ACQUIRED HYPOTHYROIDISM: ICD-10-CM

## 2023-05-15 RX ORDER — LEVOTHYROXINE SODIUM 0.05 MG/1
TABLET ORAL
Qty: 90 TABLET | Refills: 3 | Status: SHIPPED | OUTPATIENT
Start: 2023-05-15

## 2023-05-15 NOTE — TELEPHONE ENCOUNTER
Dex called requesting a refill of the below medication which has been pended for you:     Requested Prescriptions     Pending Prescriptions Disp Refills    levothyroxine (SYNTHROID) 50 MCG tablet [Pharmacy Med Name: L-THYROXINE (SYNTHROID) TABS 50MCG] 90 tablet 3     Sig: TAKE 1 TABLET DAILY       Last Appointment Date: 2/28/2023  Next Appointment Date: 9/11/2023    Allergies   Allergen Reactions    Levaquin [Levofloxacin In D5w] Other (See Comments)     Attacked muscles and tendons    Iodine Other (See Comments)     Burns       Penicillins Other (See Comments)     abcess at site of injection

## 2023-05-17 ENCOUNTER — OFFICE VISIT (OUTPATIENT)
Dept: CARDIOLOGY | Age: 78
End: 2023-05-17
Payer: MEDICARE

## 2023-05-17 VITALS
SYSTOLIC BLOOD PRESSURE: 130 MMHG | HEIGHT: 65 IN | HEART RATE: 60 BPM | WEIGHT: 153 LBS | DIASTOLIC BLOOD PRESSURE: 64 MMHG | BODY MASS INDEX: 25.49 KG/M2

## 2023-05-17 DIAGNOSIS — I49.3 PVC'S (PREMATURE VENTRICULAR CONTRACTIONS): ICD-10-CM

## 2023-05-17 DIAGNOSIS — E78.5 HYPERLIPIDEMIA, UNSPECIFIED HYPERLIPIDEMIA TYPE: ICD-10-CM

## 2023-05-17 DIAGNOSIS — I10 PRIMARY HYPERTENSION: Primary | ICD-10-CM

## 2023-05-17 PROCEDURE — 93005 ELECTROCARDIOGRAM TRACING: CPT | Performed by: INTERNAL MEDICINE

## 2023-05-17 PROCEDURE — 99214 OFFICE O/P EST MOD 30 MIN: CPT | Performed by: INTERNAL MEDICINE

## 2023-05-17 PROCEDURE — 93010 ELECTROCARDIOGRAM REPORT: CPT | Performed by: INTERNAL MEDICINE

## 2023-05-17 PROCEDURE — 3078F DIAST BP <80 MM HG: CPT | Performed by: INTERNAL MEDICINE

## 2023-05-17 PROCEDURE — 1123F ACP DISCUSS/DSCN MKR DOCD: CPT | Performed by: INTERNAL MEDICINE

## 2023-05-17 PROCEDURE — 99212 OFFICE O/P EST SF 10 MIN: CPT | Performed by: INTERNAL MEDICINE

## 2023-05-17 PROCEDURE — 3075F SYST BP GE 130 - 139MM HG: CPT | Performed by: INTERNAL MEDICINE

## 2023-05-17 NOTE — PROGRESS NOTES
Today's Date: 5/17/2023  Patient's Name: Neftali Alamo  Patient's age: [de-identified] y.o., 1945    Subjective:  Neftali Alamo is being seen in clinic today regarding   Chief Complaint   Patient presents with    Hypertension   HL, PVC      she is doing well from a cardiac standpoint. She reports being active. No chest pain, no dyspnea, no PND, no syncope or pre-syncope, no orthopnea. Past Medical History:   has a past medical history of Bacillus fragilis infection, Bronchiectasis ([address] Chronic lower back pain, Chronic radicular lumbar pain, Degenerative disc disease, Hyperlipidemia, Hyperreflexic bladder, Hypertension, Hypothyroidism, and Primary osteoarthritis of right knee. Past Surgical History:   has a past surgical history that includes Hysterectomy (1983); bronchoscopy (Left, 6/19/2012); other surgical history (Right, 4/21/11); Breast surgery (Left, 2012); Colonoscopy (9/13); Tonsillectomy; other surgical history (Right, 10/2/15); Total knee arthroplasty (Right, 12/19/2017); and pr arthrp kne condyle&platu medial&lat compartments (Right, 12/19/2017). Home Medications:  Prior to Admission medications    Medication Sig Start Date End Date Taking?  Authorizing Provider   levothyroxine (SYNTHROID) 50 MCG tablet TAKE 1 TABLET DAILY 5/15/23  Yes Nnamdi Rosario MD   Cholecalciferol (VITAMIN D3 PO) Take by mouth (See 3/14/23 DEXA)   Yes Historical Provider, MD   atorvastatin (LIPITOR) 40 MG tablet TAKE 1 TABLET DAILY 2/6/23  Yes Nnamdi Rosario MD   oxybutynin (DITROPAN-XL) 10 MG extended release tablet TAKE 1 TABLET DAILY 1/5/23  Yes Nnamdi Rosario MD   metoprolol tartrate (LOPRESSOR) 25 MG tablet TAKE 1 TABLET TWICE A DAY 1/5/23  Yes Nnamdi Rosario MD   furosemide (LASIX) 20 MG tablet TAKE 1 TABLET DAILY 7/25/22  Yes Nnamdi Rosario MD   amLODIPine (NORVASC) 10 MG tablet TAKE 1 TABLET DAILY 5/26/22  Yes Nnamdi Rosario MD   Handicap Placard MISC by Does not apply route DISABLED PARKING

## 2023-05-22 NOTE — TELEPHONE ENCOUNTER
Enedina Rey called requesting a refill of the below medication which has been pended for you:     Requested Prescriptions     Pending Prescriptions Disp Refills    amLODIPine (NORVASC) 10 MG tablet [Pharmacy Med Name: AMLODIPINE BESYLATE TABS 10MG] 90 tablet 3     Sig: TAKE 1 TABLET DAILY       Last Appointment Date: 2/28/2023  Next Appointment Date: 9/11/2023    Allergies   Allergen Reactions    Levaquin [Levofloxacin In D5w] Other (See Comments)     Attacked muscles and tendons    Iodine Other (See Comments)     Burns       Penicillins Other (See Comments)     abcess at site of injection

## 2023-05-23 RX ORDER — AMLODIPINE BESYLATE 10 MG/1
TABLET ORAL
Qty: 90 TABLET | Refills: 3 | Status: SHIPPED | OUTPATIENT
Start: 2023-05-23

## 2023-07-17 DIAGNOSIS — I10 ESSENTIAL HYPERTENSION: ICD-10-CM

## 2023-07-17 RX ORDER — FUROSEMIDE 20 MG/1
TABLET ORAL
Qty: 90 TABLET | Refills: 3 | Status: SHIPPED | OUTPATIENT
Start: 2023-07-17

## 2023-07-17 NOTE — TELEPHONE ENCOUNTER
Tim Garcia called requesting a refill of the below medication which has been pended for you:     Requested Prescriptions     Pending Prescriptions Disp Refills    furosemide (LASIX) 20 MG tablet [Pharmacy Med Name: FUROSEMIDE TABS 20MG] 90 tablet 3     Sig: TAKE 1 TABLET DAILY       Last Appointment Date: 2/28/2023  Next Appointment Date: 9/11/2023    Allergies   Allergen Reactions    Levaquin [Levofloxacin In D5w] Other (See Comments)     Attacked muscles and tendons    Iodine Other (See Comments)     Burns       Penicillins Other (See Comments)     abcess at site of injection

## 2023-09-08 ENCOUNTER — HOSPITAL ENCOUNTER (OUTPATIENT)
Age: 78
Discharge: HOME | End: 2023-09-08
Payer: MEDICARE

## 2023-09-08 DIAGNOSIS — E03.9 HYPOTHYROIDISM (ACQUIRED): ICD-10-CM

## 2023-09-08 DIAGNOSIS — E78.49 OTHER HYPERLIPIDEMIA: ICD-10-CM

## 2023-09-08 DIAGNOSIS — I10 PRIMARY HYPERTENSION: ICD-10-CM

## 2023-09-08 LAB
ALBUMIN SERPL-MCNC: 4.5 G/DL (ref 3.5–5.2)
ALBUMIN/GLOB SERPL: 1.6 {RATIO} (ref 1–2.5)
ALP SERPL-CCNC: 109 U/L (ref 35–104)
ALT SERPL-CCNC: 30 U/L (ref 5–33)
ANION GAP SERPL CALCULATED.3IONS-SCNC: 10 MMOL/L (ref 9–17)
AST SERPL-CCNC: 27 U/L
BILIRUB SERPL-MCNC: 0.5 MG/DL (ref 0.3–1.2)
BUN SERPL-MCNC: 19 MG/DL (ref 8–23)
BUN/CREAT SERPL: 24 (ref 9–20)
CALCIUM SERPL-MCNC: 9.6 MG/DL (ref 8.6–10.4)
CHLORIDE SERPL-SCNC: 104 MMOL/L (ref 98–107)
CHOLEST SERPL-MCNC: 177 MG/DL
CHOLESTEROL/HDL RATIO: 2.3
CO2 SERPL-SCNC: 28 MMOL/L (ref 20–31)
CREAT SERPL-MCNC: 0.8 MG/DL (ref 0.5–0.9)
GFR SERPL CREATININE-BSD FRML MDRD: >60 ML/MIN/1.73M2
GLUCOSE SERPL-MCNC: 97 MG/DL (ref 70–99)
HDLC SERPL-MCNC: 78 MG/DL
LDLC SERPL CALC-MCNC: 90 MG/DL (ref 0–130)
POTASSIUM SERPL-SCNC: 3.8 MMOL/L (ref 3.7–5.3)
PROT SERPL-MCNC: 7.3 G/DL (ref 6.4–8.3)
SODIUM SERPL-SCNC: 142 MMOL/L (ref 135–144)
T4 FREE SERPL-MCNC: 1.2 NG/DL (ref 0.9–1.7)
TRIGL SERPL-MCNC: 45 MG/DL
TSH SERPL DL<=0.05 MIU/L-ACNC: 2.09 UIU/ML (ref 0.3–5)

## 2023-09-08 PROCEDURE — 84443 ASSAY THYROID STIM HORMONE: CPT

## 2023-09-08 PROCEDURE — 36415 COLL VENOUS BLD VENIPUNCTURE: CPT

## 2023-09-08 PROCEDURE — 80053 COMPREHEN METABOLIC PANEL: CPT

## 2023-09-08 PROCEDURE — 80061 LIPID PANEL: CPT

## 2023-09-08 PROCEDURE — 84439 ASSAY OF FREE THYROXINE: CPT

## 2023-09-11 ENCOUNTER — OFFICE VISIT (OUTPATIENT)
Dept: INTERNAL MEDICINE | Age: 78
End: 2023-09-11
Payer: MEDICARE

## 2023-09-11 VITALS
DIASTOLIC BLOOD PRESSURE: 72 MMHG | OXYGEN SATURATION: 99 % | RESPIRATION RATE: 16 BRPM | SYSTOLIC BLOOD PRESSURE: 128 MMHG | HEIGHT: 65 IN | BODY MASS INDEX: 25.66 KG/M2 | WEIGHT: 154 LBS | HEART RATE: 61 BPM

## 2023-09-11 DIAGNOSIS — Z00.00 GENERAL MEDICAL EXAM: Primary | ICD-10-CM

## 2023-09-11 DIAGNOSIS — G89.29 CHRONIC PAIN OF LEFT KNEE: ICD-10-CM

## 2023-09-11 DIAGNOSIS — Z00.00 MEDICARE ANNUAL WELLNESS VISIT, SUBSEQUENT: ICD-10-CM

## 2023-09-11 DIAGNOSIS — I10 PRIMARY HYPERTENSION: ICD-10-CM

## 2023-09-11 DIAGNOSIS — E78.49 OTHER HYPERLIPIDEMIA: ICD-10-CM

## 2023-09-11 DIAGNOSIS — M25.562 CHRONIC PAIN OF LEFT KNEE: ICD-10-CM

## 2023-09-11 PROCEDURE — 1123F ACP DISCUSS/DSCN MKR DOCD: CPT | Performed by: INTERNAL MEDICINE

## 2023-09-11 PROCEDURE — 99214 OFFICE O/P EST MOD 30 MIN: CPT | Performed by: INTERNAL MEDICINE

## 2023-09-11 PROCEDURE — 3078F DIAST BP <80 MM HG: CPT | Performed by: INTERNAL MEDICINE

## 2023-09-11 PROCEDURE — G0439 PPPS, SUBSEQ VISIT: HCPCS | Performed by: INTERNAL MEDICINE

## 2023-09-11 PROCEDURE — 99212 OFFICE O/P EST SF 10 MIN: CPT | Performed by: INTERNAL MEDICINE

## 2023-09-11 PROCEDURE — 3074F SYST BP LT 130 MM HG: CPT | Performed by: INTERNAL MEDICINE

## 2023-09-11 ASSESSMENT — ENCOUNTER SYMPTOMS
DIARRHEA: 0
COUGH: 0
BLOOD IN STOOL: 0
NAUSEA: 0
CONSTIPATION: 0
VOMITING: 0
ABDOMINAL PAIN: 0
BACK PAIN: 0
EYE PAIN: 0
SHORTNESS OF BREATH: 0

## 2023-09-11 ASSESSMENT — PATIENT HEALTH QUESTIONNAIRE - PHQ9
SUM OF ALL RESPONSES TO PHQ QUESTIONS 1-9: 0
2. FEELING DOWN, DEPRESSED OR HOPELESS: 0
SUM OF ALL RESPONSES TO PHQ QUESTIONS 1-9: 0
SUM OF ALL RESPONSES TO PHQ9 QUESTIONS 1 & 2: 0
SUM OF ALL RESPONSES TO PHQ QUESTIONS 1-9: 0
1. LITTLE INTEREST OR PLEASURE IN DOING THINGS: 0
SUM OF ALL RESPONSES TO PHQ QUESTIONS 1-9: 0

## 2023-09-11 NOTE — PROGRESS NOTES
[address] [place] [address]  Dept: [phone]  Dept Fax: [phone]  Loc: [phone]     Rogelio Monique is a 78 y.o. female who presents today for her medical conditions/complaintsas noted below. Rogelio Monique is c/o of   Chief Complaint   Patient presents with    Medicare AWV     6 month    Hypertension    Hyperlipidemia    Knee Pain     Chronic left         HPI:     Hypertension  This is a chronic problem. The current episode started more than 1 year ago. The problem has been waxing and waning since onset. The problem is controlled. Pertinent negatives include no chest pain, headaches, neck pain, palpitations or shortness of breath. Hyperlipidemia  This is a chronic problem. The current episode started more than 1 year ago. The problem is controlled. Recent lipid tests were reviewed and are variable. Pertinent negatives include no chest pain or shortness of breath. Knee Pain   The incident occurred more than 1 week ago. The incident occurred at home. There was no injury mechanism. The pain is present in the left knee. The pain is mild. The pain has been Fluctuating since onset. Pertinent negatives include no numbness. Other  This is a recurrent (4-General medical exam) problem. The current episode started today. The problem occurs intermittently. The problem has been waxing and waning. Pertinent negatives include no abdominal pain, arthralgias, chest pain, chills, coughing, fever, headaches, nausea, neck pain, numbness, rash, vomiting or weakness.        No results found for: \"LABA1C\"         No components found for: \"LABMICR\"  LDL Cholesterol (mg/dL)   Date Value   09/08/2023 90   09/12/2022 80   07/14/2021 104         AST (U/L)   Date Value   09/08/2023 27     ALT (U/L)   Date Value   09/08/2023 30     BUN (mg/dL)   Date Value   09/08/2023 19     BP Readings from Last 3 Encounters:   09/11/23

## 2023-09-11 NOTE — PATIENT INSTRUCTIONS
Personalized Preventive Plan for Margaret Shoulders - 9/11/2023  Medicare offers a range of preventive health benefits. Some of the tests and screenings are paid in full while other may be subject to a deductible, co-insurance, and/or copay. Some of these benefits include a comprehensive review of your medical history including lifestyle, illnesses that may run in your family, and various assessments and screenings as appropriate. After reviewing your medical record and screening and assessments performed today your provider may have ordered immunizations, labs, imaging, and/or referrals for you. A list of these orders (if applicable) as well as your Preventive Care list are included within your After Visit Summary for your review. Other Preventive Recommendations:    A preventive eye exam performed by an eye specialist is recommended every 1-2 years to screen for glaucoma; cataracts, macular degeneration, and other eye disorders. A preventive dental visit is recommended every 6 months. Try to get at least 150 minutes of exercise per week or 10,000 steps per day on a pedometer . Order or download the FREE \"Exercise & Physical Activity: Your Everyday Guide\" from [employer]. Call 9-569.433.5684 or search [employer] online. You need 5031-6150 mg of calcium and 1239-7372 IU of vitamin D per day. It is possible to meet your calcium requirement with diet alone, but a vitamin D supplement is usually necessary to meet this goal.  When exposed to the sun, use a sunscreen that protects against both UVA and UVB radiation with an SPF of 30 or greater. Reapply every 2 to 3 hours or after sweating, drying off with a towel, or swimming. Always wear a seat belt when traveling in a car. Always wear a helmet when riding a bicycle or motorcycle.

## 2024-01-01 DIAGNOSIS — I10 ESSENTIAL HYPERTENSION: ICD-10-CM

## 2024-01-01 DIAGNOSIS — N32.89 SPASTIC BLADDER: ICD-10-CM

## 2024-01-02 RX ORDER — OXYBUTYNIN CHLORIDE 10 MG/1
TABLET, EXTENDED RELEASE ORAL
Qty: 90 TABLET | Refills: 3 | Status: SHIPPED | OUTPATIENT
Start: 2024-01-02

## 2024-01-02 NOTE — TELEPHONE ENCOUNTER
Palma called requesting a refill of the below medication which has been pended for you:     Requested Prescriptions     Pending Prescriptions Disp Refills    metoprolol tartrate (LOPRESSOR) 25 MG tablet [Pharmacy Med Name: METOPROLOL TARTRATE TABS 25MG] 180 tablet 3     Sig: TAKE 1 TABLET TWICE A DAY    oxyBUTYnin (DITROPAN-XL) 10 MG extended release tablet [Pharmacy Med Name: OXYBUTYNIN CHLORIDE ER TABS 10MG] 90 tablet 3     Sig: TAKE 1 TABLET DAILY       Last Appointment Date: 9/11/2023  Next Appointment Date: 3/18/2024    Allergies   Allergen Reactions    Levaquin [Levofloxacin In D5w] Other (See Comments)     Attacked muscles and tendons    Iodine Other (See Comments)     Burns       Penicillins Other (See Comments)     abcess at site of injection

## 2024-03-18 ENCOUNTER — OFFICE VISIT (OUTPATIENT)
Dept: INTERNAL MEDICINE | Age: 79
End: 2024-03-18
Payer: MEDICARE

## 2024-03-18 VITALS
WEIGHT: 157 LBS | HEIGHT: 65 IN | OXYGEN SATURATION: 100 % | SYSTOLIC BLOOD PRESSURE: 114 MMHG | RESPIRATION RATE: 16 BRPM | HEART RATE: 64 BPM | BODY MASS INDEX: 26.16 KG/M2 | DIASTOLIC BLOOD PRESSURE: 68 MMHG

## 2024-03-18 DIAGNOSIS — I10 ESSENTIAL HYPERTENSION: Primary | ICD-10-CM

## 2024-03-18 DIAGNOSIS — E03.9 ACQUIRED HYPOTHYROIDISM: ICD-10-CM

## 2024-03-18 DIAGNOSIS — L98.9 SKIN LESION: ICD-10-CM

## 2024-03-18 DIAGNOSIS — E78.49 OTHER HYPERLIPIDEMIA: ICD-10-CM

## 2024-03-18 DIAGNOSIS — N32.89 SPASTIC BLADDER: ICD-10-CM

## 2024-03-18 PROCEDURE — 3078F DIAST BP <80 MM HG: CPT | Performed by: INTERNAL MEDICINE

## 2024-03-18 PROCEDURE — 99212 OFFICE O/P EST SF 10 MIN: CPT | Performed by: INTERNAL MEDICINE

## 2024-03-18 PROCEDURE — 99214 OFFICE O/P EST MOD 30 MIN: CPT | Performed by: INTERNAL MEDICINE

## 2024-03-18 PROCEDURE — 1123F ACP DISCUSS/DSCN MKR DOCD: CPT | Performed by: INTERNAL MEDICINE

## 2024-03-18 PROCEDURE — 3074F SYST BP LT 130 MM HG: CPT | Performed by: INTERNAL MEDICINE

## 2024-03-18 RX ORDER — OXYBUTYNIN CHLORIDE 10 MG/1
10 TABLET, EXTENDED RELEASE ORAL DAILY
Qty: 90 TABLET | Refills: 3 | Status: SHIPPED | OUTPATIENT
Start: 2024-03-18

## 2024-03-18 RX ORDER — LEVOTHYROXINE SODIUM 0.05 MG/1
50 TABLET ORAL DAILY
Qty: 90 TABLET | Refills: 3 | Status: SHIPPED | OUTPATIENT
Start: 2024-03-18

## 2024-03-18 RX ORDER — ATORVASTATIN CALCIUM 40 MG/1
40 TABLET, FILM COATED ORAL DAILY
Qty: 90 TABLET | Refills: 3 | Status: SHIPPED | OUTPATIENT
Start: 2024-03-18

## 2024-03-18 SDOH — ECONOMIC STABILITY: INCOME INSECURITY: HOW HARD IS IT FOR YOU TO PAY FOR THE VERY BASICS LIKE FOOD, HOUSING, MEDICAL CARE, AND HEATING?: NOT HARD AT ALL

## 2024-03-18 SDOH — ECONOMIC STABILITY: FOOD INSECURITY: WITHIN THE PAST 12 MONTHS, YOU WORRIED THAT YOUR FOOD WOULD RUN OUT BEFORE YOU GOT MONEY TO BUY MORE.: NEVER TRUE

## 2024-03-18 SDOH — ECONOMIC STABILITY: FOOD INSECURITY: WITHIN THE PAST 12 MONTHS, THE FOOD YOU BOUGHT JUST DIDN'T LAST AND YOU DIDN'T HAVE MONEY TO GET MORE.: NEVER TRUE

## 2024-03-18 ASSESSMENT — PATIENT HEALTH QUESTIONNAIRE - PHQ9
SUM OF ALL RESPONSES TO PHQ QUESTIONS 1-9: 0
2. FEELING DOWN, DEPRESSED OR HOPELESS: NOT AT ALL
SUM OF ALL RESPONSES TO PHQ9 QUESTIONS 1 & 2: 0
SUM OF ALL RESPONSES TO PHQ QUESTIONS 1-9: 0
1. LITTLE INTEREST OR PLEASURE IN DOING THINGS: NOT AT ALL
SUM OF ALL RESPONSES TO PHQ QUESTIONS 1-9: 0
SUM OF ALL RESPONSES TO PHQ QUESTIONS 1-9: 0

## 2024-03-18 ASSESSMENT — ENCOUNTER SYMPTOMS
BLOOD IN STOOL: 0
ABDOMINAL PAIN: 0
NAUSEA: 0
DIARRHEA: 0
CONSTIPATION: 0
SHORTNESS OF BREATH: 0
BACK PAIN: 0
EYE PAIN: 0
VOMITING: 0
COUGH: 0

## 2024-03-18 NOTE — PROGRESS NOTES
Genitourinary:  Negative for difficulty urinating, dysuria and hematuria.   Musculoskeletal:  Negative for arthralgias, back pain, gait problem and neck pain.   Skin:  Negative for pallor and rash.   Neurological:  Negative for dizziness, weakness, numbness and headaches.   Hematological:  Negative for adenopathy. Does not bruise/bleed easily.   Psychiatric/Behavioral:  Negative for confusion. The patient is not nervous/anxious.        Objective:     Physical Exam  Vitals reviewed.   Constitutional:       Appearance: She is well-developed.   HENT:      Head: Normocephalic and atraumatic.   Eyes:      Pupils: Pupils are equal, round, and reactive to light.   Cardiovascular:      Rate and Rhythm: Normal rate and regular rhythm.      Heart sounds: No murmur heard.     No friction rub. No gallop.   Pulmonary:      Effort: Pulmonary effort is normal.      Breath sounds: Normal breath sounds. No wheezing or rales.   Abdominal:      General: There is no distension.      Palpations: Abdomen is soft. There is no mass.      Tenderness: There is no abdominal tenderness. There is no rebound.   Musculoskeletal:         General: Normal range of motion.      Cervical back: Neck supple.   Lymphadenopathy:      Cervical: No cervical adenopathy.   Skin:     General: Skin is warm and dry.      Findings: No rash.   Neurological:      Mental Status: She is alert and oriented to person, place, and time.      Cranial Nerves: No cranial nerve deficit (grossly).   Psychiatric:         Thought Content: Thought content normal.        /68 (Site: Right Upper Arm, Position: Sitting, Cuff Size: Large Adult)   Pulse 64   Resp 16   Ht 1.651 m (5' 5\")   Wt 71.2 kg (157 lb)   LMP 08/15/1983 (Exact Date)   SpO2 100%   BMI 26.13 kg/m²     Assessment:       Diagnosis Orders   1. Essential hypertension  Comprehensive Metabolic Panel    metoprolol tartrate (LOPRESSOR) 25 MG tablet      2. Other hyperlipidemia  Lipid, Fasting    atorvastatin

## 2024-04-01 RX ORDER — AMLODIPINE BESYLATE 10 MG/1
10 TABLET ORAL DAILY
Qty: 90 TABLET | Refills: 3 | Status: CANCELLED | OUTPATIENT
Start: 2024-04-01

## 2024-06-06 RX ORDER — AMLODIPINE BESYLATE 10 MG/1
10 TABLET ORAL DAILY
Qty: 90 TABLET | Refills: 3 | Status: SHIPPED | OUTPATIENT
Start: 2024-06-06

## 2024-06-06 NOTE — TELEPHONE ENCOUNTER
Fax received from [facility] requesting a refill on patients Norvasc.    Script is pended

## 2024-07-08 RX ORDER — METHYLPREDNISOLONE 4 MG/1
TABLET ORAL
Qty: 1 KIT | Refills: 0 | Status: SHIPPED | OUTPATIENT
Start: 2024-07-08 | End: 2024-07-14

## 2024-07-08 NOTE — TELEPHONE ENCOUNTER
Patient called c/o sciatic pain asking for a steroid to be sent to [facility] if they are still open, if not then she would like it sen to [facility].

## 2024-08-08 ENCOUNTER — HOSPITAL ENCOUNTER (OUTPATIENT)
Dept: MRI IMAGING | Age: 79
Discharge: HOME | End: 2024-08-10
Payer: MEDICARE

## 2024-08-08 DIAGNOSIS — M47.816 SPONDYLOSIS OF LUMBAR REGION WITHOUT MYELOPATHY OR RADICULOPATHY: ICD-10-CM

## 2024-08-08 PROCEDURE — 72148 MRI LUMBAR SPINE W/O DYE: CPT

## 2024-08-18 ENCOUNTER — HOSPITAL ENCOUNTER (OUTPATIENT)
Dept: GENERAL RADIOLOGY | Age: 79
Discharge: HOME | End: 2024-08-20
Payer: MEDICARE

## 2024-08-18 ENCOUNTER — HOSPITAL ENCOUNTER (OUTPATIENT)
Age: 79
Discharge: HOME | End: 2024-08-20
Payer: MEDICARE

## 2024-08-18 ENCOUNTER — OFFICE VISIT (OUTPATIENT)
Dept: PRIMARY CARE CLINIC | Age: 79
End: 2024-08-18
Payer: MEDICARE

## 2024-08-18 VITALS
SYSTOLIC BLOOD PRESSURE: 118 MMHG | TEMPERATURE: 98.1 F | RESPIRATION RATE: 18 BRPM | OXYGEN SATURATION: 93 % | DIASTOLIC BLOOD PRESSURE: 68 MMHG | HEIGHT: 66 IN | BODY MASS INDEX: 25.23 KG/M2 | HEART RATE: 64 BPM | WEIGHT: 157 LBS

## 2024-08-18 DIAGNOSIS — R05.9 COUGH, UNSPECIFIED TYPE: Primary | ICD-10-CM

## 2024-08-18 DIAGNOSIS — R05.9 COUGH, UNSPECIFIED TYPE: ICD-10-CM

## 2024-08-18 LAB
INFLUENZA A ANTIGEN, POC: NEGATIVE
INFLUENZA B ANTIGEN, POC: NEGATIVE
LOT EXPIRE DATE: NORMAL
LOT KIT NUMBER: NORMAL
SARS-COV-2, POC: NORMAL
VALID INTERNAL CONTROL: NORMAL
VENDOR AND KIT NAME POC: NORMAL

## 2024-08-18 PROCEDURE — 71046 X-RAY EXAM CHEST 2 VIEWS: CPT

## 2024-08-18 RX ORDER — GABAPENTIN 100 MG/1
CAPSULE ORAL
COMMUNITY
Start: 2024-08-09

## 2024-08-18 RX ORDER — CEFUROXIME AXETIL 500 MG/1
500 TABLET ORAL 2 TIMES DAILY
Qty: 20 TABLET | Refills: 0 | Status: SHIPPED | OUTPATIENT
Start: 2024-08-18 | End: 2024-08-28

## 2024-08-18 RX ORDER — AZITHROMYCIN 250 MG/1
TABLET, FILM COATED ORAL
Qty: 6 TABLET | Refills: 0 | Status: SHIPPED | OUTPATIENT
Start: 2024-08-18 | End: 2024-08-28

## 2024-08-23 ENCOUNTER — OFFICE VISIT (OUTPATIENT)
Dept: INTERNAL MEDICINE | Age: 79
End: 2024-08-23
Payer: MEDICARE

## 2024-08-23 VITALS
HEIGHT: 65 IN | DIASTOLIC BLOOD PRESSURE: 74 MMHG | HEART RATE: 76 BPM | SYSTOLIC BLOOD PRESSURE: 128 MMHG | OXYGEN SATURATION: 95 % | BODY MASS INDEX: 25.49 KG/M2 | WEIGHT: 153 LBS | RESPIRATION RATE: 16 BRPM

## 2024-08-23 DIAGNOSIS — E78.49 OTHER HYPERLIPIDEMIA: ICD-10-CM

## 2024-08-23 DIAGNOSIS — I10 PRIMARY HYPERTENSION: ICD-10-CM

## 2024-08-23 DIAGNOSIS — J20.9 ACUTE BRONCHITIS, UNSPECIFIED ORGANISM: ICD-10-CM

## 2024-08-23 DIAGNOSIS — R05.1 ACUTE COUGH: Primary | ICD-10-CM

## 2024-08-23 PROCEDURE — 99212 OFFICE O/P EST SF 10 MIN: CPT | Performed by: INTERNAL MEDICINE

## 2024-08-23 RX ORDER — AMOXICILLIN AND CLAVULANATE POTASSIUM 875; 125 MG/1; MG/1
1 TABLET, FILM COATED ORAL 2 TIMES DAILY
Qty: 20 TABLET | Refills: 0 | Status: SHIPPED | OUTPATIENT
Start: 2024-08-23 | End: 2024-09-02

## 2024-08-23 ASSESSMENT — ENCOUNTER SYMPTOMS
DIARRHEA: 0
SHORTNESS OF BREATH: 0
COUGH: 1
CONSTIPATION: 0
BACK PAIN: 0
EYE PAIN: 0
NAUSEA: 0
ABDOMINAL PAIN: 0
BLOOD IN STOOL: 0
VOMITING: 0

## 2024-08-23 NOTE — PROGRESS NOTES
diarrhea, nausea and vomiting.   Endocrine: Negative for cold intolerance, polydipsia and polyuria.   Genitourinary:  Negative for difficulty urinating, dysuria and hematuria.   Musculoskeletal:  Negative for arthralgias, back pain, gait problem and neck pain.   Skin:  Negative for pallor and rash.   Neurological:  Negative for dizziness, weakness, numbness and headaches.   Hematological:  Negative for adenopathy. Does not bruise/bleed easily.   Psychiatric/Behavioral:  Negative for confusion. The patient is not nervous/anxious.        Objective:     Physical Exam  Vitals reviewed.   Constitutional:       Appearance: She is well-developed.   HENT:      Head: Normocephalic and atraumatic.   Eyes:      Extraocular Movements: EOM normal.      Pupils: Pupils are equal, round, and reactive to light.   Cardiovascular:      Rate and Rhythm: Normal rate and regular rhythm.      Heart sounds: No murmur heard.     No friction rub. No gallop.   Pulmonary:      Effort: Pulmonary effort is normal.      Breath sounds: Normal breath sounds. No wheezing or rales.   Abdominal:      General: There is no distension.      Palpations: Abdomen is soft. There is no mass.      Tenderness: There is no abdominal tenderness. There is no rebound.   Musculoskeletal:         General: No edema. Normal range of motion.      Cervical back: Neck supple.   Lymphadenopathy:      Cervical: No cervical adenopathy.   Skin:     General: Skin is warm and dry.      Findings: No rash.   Neurological:      Mental Status: She is alert and oriented to person, place, and time.      Cranial Nerves: No cranial nerve deficit (grossly).   Psychiatric:         Mood and Affect: Mood and affect normal.         Thought Content: Thought content normal.        /74 (Site: Left Upper Arm, Position: Sitting, Cuff Size: Medium Adult)   Pulse 76   Resp 16   Ht 1.651 m (5' 5\")   Wt 69.4 kg (153 lb)   LMP 08/15/1983 (Exact Date)   SpO2 95%   BMI 25.46 kg/m²

## 2024-08-27 ENCOUNTER — OFFICE VISIT (OUTPATIENT)
Dept: CARDIOLOGY | Age: 79
End: 2024-08-27
Payer: MEDICARE

## 2024-08-27 VITALS
HEIGHT: 66 IN | WEIGHT: 154 LBS | DIASTOLIC BLOOD PRESSURE: 70 MMHG | BODY MASS INDEX: 24.75 KG/M2 | SYSTOLIC BLOOD PRESSURE: 126 MMHG | HEART RATE: 70 BPM

## 2024-08-27 DIAGNOSIS — I10 PRIMARY HYPERTENSION: Primary | ICD-10-CM

## 2024-08-27 PROCEDURE — 93010 ELECTROCARDIOGRAM REPORT: CPT | Performed by: INTERNAL MEDICINE

## 2024-08-27 PROCEDURE — 99212 OFFICE O/P EST SF 10 MIN: CPT | Performed by: INTERNAL MEDICINE

## 2024-08-27 PROCEDURE — 3078F DIAST BP <80 MM HG: CPT | Performed by: INTERNAL MEDICINE

## 2024-08-27 PROCEDURE — 1123F ACP DISCUSS/DSCN MKR DOCD: CPT | Performed by: INTERNAL MEDICINE

## 2024-08-27 PROCEDURE — 93005 ELECTROCARDIOGRAM TRACING: CPT | Performed by: INTERNAL MEDICINE

## 2024-08-27 PROCEDURE — 3074F SYST BP LT 130 MM HG: CPT | Performed by: INTERNAL MEDICINE

## 2024-08-27 PROCEDURE — 99214 OFFICE O/P EST MOD 30 MIN: CPT | Performed by: INTERNAL MEDICINE

## 2024-08-27 RX ORDER — ROSUVASTATIN CALCIUM 20 MG/1
20 TABLET, COATED ORAL NIGHTLY
Qty: 90 TABLET | Refills: 5 | Status: SHIPPED | OUTPATIENT
Start: 2024-08-27

## 2024-08-27 NOTE — PROGRESS NOTES
patient has the following condition(s) which qualifies him/her for disabled parking: Walking severely limited due to orthopedic condition     Privilege Duration: Temporary for 3 months 1 each 0    Calcium Carb-Cholecalciferol (CALCIUM 600+D3 PO) Take 2 capsules by mouth daily      Multiple Vitamins-Minerals (MULTIVITAMIN PO) Take 2 tablets by mouth every morning        Current Facility-Administered Medications   Medication Dose Route Frequency Provider Last Rate Last Admin    lidocaine 1 % injection 4 mL  4 mL IntraDERmal Once Rossi Bazzi PA-C            Patient Active Problem List   Diagnosis    Hypothyroidism    Hypertension    Pneumonia    Mycobacterium avium-intracellulare complex (HCC)    Hemoptysis    Tuberculous bronchiectasis, 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 There's been No change in energy level, No change in activity level.     Eyes: No visual changes or diplopia. No scleral icterus.  ENT: No Headaches, hearing loss or vertigo. No mouth sores or sore throat.  Cardiovascular: per hpi  Respiratory: per hpi  Gastrointestinal: No abdominal pain, appetite loss, blood in stools. No change in bowel or bladder habits.  Genitourinary: No dysuria, trouble voiding, or hematuria.  Musculoskeletal:  No gait disturbance, No weakness or joint complaints.  Integumentary: No rash or pruritis.  Neurological: No headache, diplopia, change in muscle strength, numbness or tingling. No change in gait, balance, coordination, mood, affect, memory, mentation,  stress test 12/11/17 with normal LVEF on TTE 12/11/17.  HTN- Continue metoprolol and norvasc. Monitor BP at home. Stable.  Hyperlipidemia- not at goal. Switch to crestor 20 instead of atorva 40.  Hypothyroidism- on replacement.  S/p R TKA- winter 2018  S/p L TKA 5/2022  ECG reviewed and compared to prior. No acute ischemia.  Obesity - Chronic. Encouraged diet, exercise, and discussed weight loss extensively.  Former Tobacco abuse. Chronic. Discussed extensively  Lifestyle modifications with heart healthy and appropriate diet.    Thank you for allowing me to participate in the care of this patient, please do not hesitate to call if you have any questions.    Lalito Edwards DO, [facility]  Board Certified Cardiologist  Fellow of the American College of Cardiology    Obesity & Weight Loss Medicine   of Medicine [facility]   of Medicine [facility]  [facility]  [phone]

## 2024-10-02 ENCOUNTER — HOSPITAL ENCOUNTER (OUTPATIENT)
Dept: PREADMISSION TESTING | Age: 79
Discharge: HOME | End: 2024-10-06
Payer: MEDICARE

## 2024-10-02 VITALS
HEIGHT: 66 IN | WEIGHT: 158.07 LBS | TEMPERATURE: 97.1 F | RESPIRATION RATE: 16 BRPM | HEART RATE: 65 BPM | BODY MASS INDEX: 25.4 KG/M2 | DIASTOLIC BLOOD PRESSURE: 62 MMHG | SYSTOLIC BLOOD PRESSURE: 136 MMHG | OXYGEN SATURATION: 98 %

## 2024-10-02 LAB
BASOPHILS # BLD: 0.05 K/UL (ref 0–0.2)
BASOPHILS NFR BLD: 1 % (ref 0–2)
BILIRUB UR QL STRIP: NEGATIVE
CLARITY UR: CLEAR
COLOR UR: YELLOW
COMMENT: NORMAL
EOSINOPHIL # BLD: 0.43 K/UL (ref 0–0.44)
EOSINOPHILS RELATIVE PERCENT: 7 % (ref 1–4)
ERYTHROCYTE [DISTWIDTH] IN BLOOD BY AUTOMATED COUNT: 14 % (ref 11.8–14.4)
GLUCOSE UR STRIP-MCNC: NEGATIVE MG/DL
HCT VFR BLD AUTO: 44.6 % (ref 36.3–47.1)
HGB BLD-MCNC: 14.6 G/DL (ref 11.9–15.1)
HGB UR QL STRIP.AUTO: NEGATIVE
IMM GRANULOCYTES # BLD AUTO: 0.02 K/UL (ref 0–0.3)
IMM GRANULOCYTES NFR BLD: 0 %
INR PPP: 1
KETONES UR STRIP-MCNC: NEGATIVE MG/DL
LEUKOCYTE ESTERASE UR QL STRIP: NEGATIVE
LYMPHOCYTES NFR BLD: 1.08 K/UL (ref 1.1–3.7)
LYMPHOCYTES RELATIVE PERCENT: 18 % (ref 24–43)
MCH RBC QN AUTO: 29.3 PG (ref 25.2–33.5)
MCHC RBC AUTO-ENTMCNC: 32.7 G/DL (ref 28.4–34.8)
MCV RBC AUTO: 89.6 FL (ref 82.6–102.9)
MONOCYTES NFR BLD: 0.54 K/UL (ref 0.1–1.2)
MONOCYTES NFR BLD: 9 % (ref 3–12)
NEUTROPHILS NFR BLD: 65 % (ref 36–65)
NEUTS SEG NFR BLD: 3.75 K/UL (ref 1.5–8.1)
NITRITE UR QL STRIP: NEGATIVE
NRBC BLD-RTO: 0 PER 100 WBC
PARTIAL THROMBOPLASTIN TIME: 27.5 SEC (ref 23.9–33.8)
PH UR STRIP: 5.5 [PH] (ref 5–8)
PLATELET # BLD AUTO: 207 K/UL (ref 138–453)
PMV BLD AUTO: 9.9 FL (ref 8.1–13.5)
PROT UR STRIP-MCNC: NEGATIVE MG/DL
PROTHROMBIN TIME: 13.4 SEC (ref 11.5–14.2)
RBC # BLD AUTO: 4.98 M/UL (ref 3.95–5.11)
SP GR UR STRIP: 1.02 (ref 1–1.03)
UROBILINOGEN UR STRIP-ACNC: NORMAL EU/DL (ref 0–1)
WBC OTHER # BLD: 5.9 K/UL (ref 3.5–11.3)

## 2024-10-02 PROCEDURE — 87086 URINE CULTURE/COLONY COUNT: CPT

## 2024-10-02 PROCEDURE — 85025 COMPLETE CBC W/AUTO DIFF WBC: CPT

## 2024-10-02 PROCEDURE — 36415 COLL VENOUS BLD VENIPUNCTURE: CPT

## 2024-10-02 PROCEDURE — 85610 PROTHROMBIN TIME: CPT

## 2024-10-02 PROCEDURE — 85730 THROMBOPLASTIN TIME PARTIAL: CPT

## 2024-10-02 PROCEDURE — 81003 URINALYSIS AUTO W/O SCOPE: CPT

## 2024-10-02 ASSESSMENT — PAIN SCALES - GENERAL: PAINLEVEL_OUTOF10: 0

## 2024-10-02 NOTE — PRE-PROCEDURE INSTRUCTIONS
ARRIVE AT THE HOSPITAL ON Wednesday, 11/6/2024 at 0545 AM    Once you enter the hospital lobby, take the elevators to the second floor.  Check-In is at the surgery registration desk.    Continue to take your home medications as you normally do up to and including the night before surgery with the exception of any blood thinning medications.    Please stop any blood thinning medications as directed by your surgeon or prescribing physician. Failure to stop certain medications may interfere with your scheduled surgery.    These may include:  Aspirin, Warfarin (Coumadin), Clopidogrel (Plavix), Ibuprofen (Motrin, Advil), Naproxen (Aleve), Meloxicam (Mobic), Celecoxib (Celebrex), Eliquis, Pradaxa, Xarelto, Effient, Fish Oil, Herbal supplements.     If you are diabetic, do not take any of your diabetic medications by mouth the morning of surgery.  If you are taking insulin contact the doctor that manages your diabetes for instructions about any changes to your insulin dosages the day before surgery.    Do not inject insulin or other injectable diabetic medications the morning of surgery unless otherwise instructed by the doctor who manages your diabetes.      Please take the following medication(s) the day of surgery with a small sip of water:  Gabapentin, Metoprolol, Amlodipine, Levothyroxine    Please use your inhalers at home the day of surgery      PREPARING FOR YOUR SURGERY:     Before surgery, you can play an important role in your own health. Because skin is not sterile, we need to be sure that your skin is as free of germs as possible before surgery by carefully washing before surgery.  Preparing or “prepping” skin before surgery can reduce the risk of a “surgical site infection.”  Do not shave the area of your body where your surgery will be performed unless you received specific permission from your physician.    Preoperative Bathing Instructions  Bathe or shower the day of surgery.  Wear clean clothing after

## 2024-10-03 ENCOUNTER — ANESTHESIA EVENT (OUTPATIENT)
Dept: OPERATING ROOM | Age: 79
End: 2024-10-03
Payer: MEDICARE

## 2024-10-03 LAB
MICROORGANISM SPEC CULT: NORMAL
SERVICE CMNT-IMP: NORMAL
SPECIMEN DESCRIPTION: NORMAL

## 2024-10-04 ENCOUNTER — TELEPHONE (OUTPATIENT)
Dept: CARDIOLOGY | Age: 79
End: 2024-10-04

## 2024-10-04 NOTE — TELEPHONE ENCOUNTER
Cardiac clearance requested (see scan) from Dr. Marty Verdugo, [facility]:     L4-5 LAMINECTOMY with LT discectomy under general anesthesia, scheduled 11/6/24 at [facility].    Must be off any blood thinners 7 days before and 7 day after--NONE listed on chart.    Fax clearance to [phone].

## 2024-10-18 ENCOUNTER — OFFICE VISIT (OUTPATIENT)
Dept: INTERNAL MEDICINE | Age: 79
End: 2024-10-18
Payer: MEDICARE

## 2024-10-18 VITALS
WEIGHT: 156 LBS | DIASTOLIC BLOOD PRESSURE: 64 MMHG | HEART RATE: 59 BPM | RESPIRATION RATE: 16 BRPM | OXYGEN SATURATION: 98 % | HEIGHT: 66 IN | SYSTOLIC BLOOD PRESSURE: 118 MMHG | BODY MASS INDEX: 25.07 KG/M2

## 2024-10-18 DIAGNOSIS — E78.49 OTHER HYPERLIPIDEMIA: ICD-10-CM

## 2024-10-18 DIAGNOSIS — G89.29 CHRONIC RADICULAR LUMBAR PAIN: ICD-10-CM

## 2024-10-18 DIAGNOSIS — I10 PRIMARY HYPERTENSION: ICD-10-CM

## 2024-10-18 DIAGNOSIS — M54.16 CHRONIC RADICULAR LUMBAR PAIN: ICD-10-CM

## 2024-10-18 DIAGNOSIS — Z01.818 PRE-OP EVALUATION: Primary | ICD-10-CM

## 2024-10-18 PROCEDURE — 99212 OFFICE O/P EST SF 10 MIN: CPT | Performed by: INTERNAL MEDICINE

## 2024-10-18 ASSESSMENT — ENCOUNTER SYMPTOMS
DIARRHEA: 0
ABDOMINAL PAIN: 0
CONSTIPATION: 0
NAUSEA: 0
BACK PAIN: 1
EYE PAIN: 0
SHORTNESS OF BREATH: 0
COUGH: 0
BLOOD IN STOOL: 0
VOMITING: 0

## 2024-10-18 NOTE — PROGRESS NOTES
[facility]  [facility] MED A DEPARTMENT OF [facility]  [address] [facility] [address]  Dept: [phone]  Dept Fax: [phone]  Loc: [phone]     Palma Silva is a 79 y.o. female who presents today for her medical conditions/complaintsas noted below.  Palma Silva is c/o of   Chief Complaint   Patient presents with    Pre-op Exam     L4-L5 Laminectomy    Hypertension    Hyperlipidemia         HPI:     Hypertension  This is a chronic problem. The current episode started more than 1 year ago. The problem has been waxing and waning since onset. The problem is controlled. Pertinent negatives include no chest pain, headaches, neck pain, palpitations or shortness of breath.   Hyperlipidemia  This is a chronic problem. The current episode started more than 1 year ago. The problem is controlled. Recent lipid tests were reviewed and are variable. Pertinent negatives include no chest pain or shortness of breath.   Other  This is a new (3-preop evaluation, for upcoming lumbar spine surgery) problem. The current episode started today. The problem occurs intermittently. The problem has been waxing and waning. Pertinent negatives include no abdominal pain, arthralgias, chest pain, chills, coughing, fever, headaches, nausea, neck pain, numbness, rash, vomiting or weakness.       No results found for: \"LABA1C\"         No components found for: \"LABMICR\"  No components found for: \"LDLCHOLESTEROL\", \"LDLCALC\"      AST (U/L)   Date Value   09/08/2023 27     ALT (U/L)   Date Value   09/08/2023 30     BUN (mg/dL)   Date Value   09/08/2023 19     BP Readings from Last 3 Encounters:   10/18/24 118/64   10/02/24 136/62   08/27/24 126/70              Past Medical History:   Diagnosis Date    Bacillus fragilis infection 2012    treated one year Dr Ramsey/JAZMINE    Bronchiectasis ([facility]) 2012    Chronic lower back pain 02/05/2016    Chronic radicular lumbar pain 10/02/2015    Degenerative disc

## 2024-11-06 ENCOUNTER — HOSPITAL ENCOUNTER (OUTPATIENT)
Age: 79
Discharge: HOME | End: 2024-11-07
Attending: ORTHOPAEDIC SURGERY | Admitting: ORTHOPAEDIC SURGERY
Payer: MEDICARE

## 2024-11-06 ENCOUNTER — ANESTHESIA (OUTPATIENT)
Dept: OPERATING ROOM | Age: 79
End: 2024-11-06
Payer: MEDICARE

## 2024-11-06 ENCOUNTER — APPOINTMENT (OUTPATIENT)
Dept: GENERAL RADIOLOGY | Age: 79
End: 2024-11-06
Attending: ORTHOPAEDIC SURGERY
Payer: MEDICARE

## 2024-11-06 DIAGNOSIS — M51.26 HERNIATED LUMBAR INTERVERTEBRAL DISC: ICD-10-CM

## 2024-11-06 DIAGNOSIS — M48.062 LUMBAR STENOSIS WITH NEUROGENIC CLAUDICATION: Primary | ICD-10-CM

## 2024-11-06 LAB
ANION GAP SERPL CALCULATED.3IONS-SCNC: 13 MMOL/L (ref 9–16)
BUN SERPL-MCNC: 13 MG/DL (ref 8–23)
CALCIUM SERPL-MCNC: 9.6 MG/DL (ref 8.8–10.2)
CHLORIDE SERPL-SCNC: 107 MMOL/L (ref 98–107)
CO2 SERPL-SCNC: 23 MMOL/L (ref 20–31)
CREAT SERPL-MCNC: 0.7 MG/DL (ref 0.5–0.9)
GFR, ESTIMATED: 86 ML/MIN/1.73M2
GLUCOSE SERPL-MCNC: 100 MG/DL (ref 82–115)
POTASSIUM SERPL-SCNC: 3.8 MMOL/L (ref 3.7–5.3)
SODIUM SERPL-SCNC: 143 MMOL/L (ref 136–145)

## 2024-11-06 PROCEDURE — 2709999900 HC NON-CHARGEABLE SUPPLY: Performed by: ORTHOPAEDIC SURGERY

## 2024-11-06 PROCEDURE — 7100000000 HC PACU RECOVERY - FIRST 15 MIN: Performed by: ORTHOPAEDIC SURGERY

## 2024-11-06 PROCEDURE — 6370000000 HC RX 637 (ALT 250 FOR IP): Performed by: ORTHOPAEDIC SURGERY

## 2024-11-06 PROCEDURE — 2720000010 HC SURG SUPPLY STERILE: Performed by: ORTHOPAEDIC SURGERY

## 2024-11-06 PROCEDURE — 2580000003 HC RX 258: Performed by: ANESTHESIOLOGY

## 2024-11-06 PROCEDURE — 97116 GAIT TRAINING THERAPY: CPT

## 2024-11-06 PROCEDURE — 7100000001 HC PACU RECOVERY - ADDTL 15 MIN: Performed by: ORTHOPAEDIC SURGERY

## 2024-11-06 PROCEDURE — 99221 1ST HOSP IP/OBS SF/LOW 40: CPT

## 2024-11-06 PROCEDURE — 2580000003 HC RX 258: Performed by: ORTHOPAEDIC SURGERY

## 2024-11-06 PROCEDURE — P9041 ALBUMIN (HUMAN),5%, 50ML: HCPCS | Performed by: NURSE ANESTHETIST, CERTIFIED REGISTERED

## 2024-11-06 PROCEDURE — 6360000002 HC RX W HCPCS: Performed by: ORTHOPAEDIC SURGERY

## 2024-11-06 PROCEDURE — 3600000012 HC SURGERY LEVEL 2 ADDTL 15MIN: Performed by: ORTHOPAEDIC SURGERY

## 2024-11-06 PROCEDURE — 2500000003 HC RX 250 WO HCPCS: Performed by: ORTHOPAEDIC SURGERY

## 2024-11-06 PROCEDURE — 6360000002 HC RX W HCPCS: Performed by: NURSE ANESTHETIST, CERTIFIED REGISTERED

## 2024-11-06 PROCEDURE — 2500000003 HC RX 250 WO HCPCS: Performed by: NURSE ANESTHETIST, CERTIFIED REGISTERED

## 2024-11-06 PROCEDURE — 3600000002 HC SURGERY LEVEL 2 BASE: Performed by: ORTHOPAEDIC SURGERY

## 2024-11-06 PROCEDURE — 97530 THERAPEUTIC ACTIVITIES: CPT

## 2024-11-06 PROCEDURE — 97162 PT EVAL MOD COMPLEX 30 MIN: CPT

## 2024-11-06 PROCEDURE — 3700000001 HC ADD 15 MINUTES (ANESTHESIA): Performed by: ORTHOPAEDIC SURGERY

## 2024-11-06 PROCEDURE — 80048 BASIC METABOLIC PNL TOTAL CA: CPT

## 2024-11-06 PROCEDURE — 6370000000 HC RX 637 (ALT 250 FOR IP): Performed by: ANESTHESIOLOGY

## 2024-11-06 PROCEDURE — 6360000002 HC RX W HCPCS: Performed by: ANESTHESIOLOGY

## 2024-11-06 PROCEDURE — 3700000000 HC ANESTHESIA ATTENDED CARE: Performed by: ORTHOPAEDIC SURGERY

## 2024-11-06 RX ORDER — MORPHINE SULFATE 4 MG/ML
4 INJECTION, SOLUTION INTRAMUSCULAR; INTRAVENOUS
Status: DISCONTINUED | OUTPATIENT
Start: 2024-11-06 | End: 2024-11-07 | Stop reason: HOSPADM

## 2024-11-06 RX ORDER — NALOXONE HYDROCHLORIDE 0.4 MG/ML
INJECTION, SOLUTION INTRAMUSCULAR; INTRAVENOUS; SUBCUTANEOUS PRN
Status: DISCONTINUED | OUTPATIENT
Start: 2024-11-06 | End: 2024-11-06 | Stop reason: HOSPADM

## 2024-11-06 RX ORDER — FENTANYL CITRATE 50 UG/ML
INJECTION, SOLUTION INTRAMUSCULAR; INTRAVENOUS
Status: DISCONTINUED | OUTPATIENT
Start: 2024-11-06 | End: 2024-11-06 | Stop reason: SDUPTHER

## 2024-11-06 RX ORDER — DIPHENHYDRAMINE HYDROCHLORIDE 50 MG/ML
12.5 INJECTION INTRAMUSCULAR; INTRAVENOUS
Status: DISCONTINUED | OUTPATIENT
Start: 2024-11-06 | End: 2024-11-06 | Stop reason: HOSPADM

## 2024-11-06 RX ORDER — GABAPENTIN 100 MG/1
100 CAPSULE ORAL NIGHTLY
Status: DISCONTINUED | OUTPATIENT
Start: 2024-11-06 | End: 2024-11-07 | Stop reason: HOSPADM

## 2024-11-06 RX ORDER — POLYETHYLENE GLYCOL 3350 17 G/17G
17 POWDER, FOR SOLUTION ORAL DAILY
Status: DISCONTINUED | OUTPATIENT
Start: 2024-11-06 | End: 2024-11-07 | Stop reason: HOSPADM

## 2024-11-06 RX ORDER — DEXAMETHASONE SODIUM PHOSPHATE 10 MG/ML
6 INJECTION, SOLUTION INTRAMUSCULAR; INTRAVENOUS EVERY 8 HOURS
Status: DISCONTINUED | OUTPATIENT
Start: 2024-11-06 | End: 2024-11-07 | Stop reason: HOSPADM

## 2024-11-06 RX ORDER — SENNA AND DOCUSATE SODIUM 50; 8.6 MG/1; MG/1
1 TABLET, FILM COATED ORAL DAILY
Qty: 60 TABLET | Refills: 0 | Status: SHIPPED | OUTPATIENT
Start: 2024-11-06

## 2024-11-06 RX ORDER — METOCLOPRAMIDE HYDROCHLORIDE 5 MG/ML
10 INJECTION INTRAMUSCULAR; INTRAVENOUS
Status: DISCONTINUED | OUTPATIENT
Start: 2024-11-06 | End: 2024-11-06 | Stop reason: HOSPADM

## 2024-11-06 RX ORDER — SODIUM CHLORIDE 9 MG/ML
INJECTION, SOLUTION INTRAVENOUS CONTINUOUS
Status: DISCONTINUED | OUTPATIENT
Start: 2024-11-06 | End: 2024-11-06 | Stop reason: HOSPADM

## 2024-11-06 RX ORDER — ROCURONIUM BROMIDE 10 MG/ML
INJECTION, SOLUTION INTRAVENOUS
Status: DISCONTINUED | OUTPATIENT
Start: 2024-11-06 | End: 2024-11-06 | Stop reason: SDUPTHER

## 2024-11-06 RX ORDER — HYDROCODONE BITARTRATE AND ACETAMINOPHEN 5; 325 MG/1; MG/1
2 TABLET ORAL EVERY 4 HOURS PRN
Status: DISCONTINUED | OUTPATIENT
Start: 2024-11-06 | End: 2024-11-07 | Stop reason: HOSPADM

## 2024-11-06 RX ORDER — ONDANSETRON 2 MG/ML
4 INJECTION INTRAMUSCULAR; INTRAVENOUS EVERY 6 HOURS PRN
Status: DISCONTINUED | OUTPATIENT
Start: 2024-11-06 | End: 2024-11-07 | Stop reason: HOSPADM

## 2024-11-06 RX ORDER — ALBUMIN, HUMAN INJ 5% 5 %
SOLUTION INTRAVENOUS
Status: DISCONTINUED | OUTPATIENT
Start: 2024-11-06 | End: 2024-11-06 | Stop reason: SDUPTHER

## 2024-11-06 RX ORDER — SODIUM CHLORIDE, SODIUM LACTATE, POTASSIUM CHLORIDE, CALCIUM CHLORIDE 600; 310; 30; 20 MG/100ML; MG/100ML; MG/100ML; MG/100ML
INJECTION, SOLUTION INTRAVENOUS CONTINUOUS
Status: DISCONTINUED | OUTPATIENT
Start: 2024-11-06 | End: 2024-11-06 | Stop reason: HOSPADM

## 2024-11-06 RX ORDER — HYDROMORPHONE HYDROCHLORIDE 1 MG/ML
0.5 INJECTION, SOLUTION INTRAMUSCULAR; INTRAVENOUS; SUBCUTANEOUS EVERY 5 MIN PRN
Status: DISCONTINUED | OUTPATIENT
Start: 2024-11-06 | End: 2024-11-06 | Stop reason: HOSPADM

## 2024-11-06 RX ORDER — SODIUM CHLORIDE 0.9 % (FLUSH) 0.9 %
5-40 SYRINGE (ML) INJECTION PRN
Status: DISCONTINUED | OUTPATIENT
Start: 2024-11-06 | End: 2024-11-07 | Stop reason: HOSPADM

## 2024-11-06 RX ORDER — EPHEDRINE SULFATE/0.9% NACL/PF 25 MG/5 ML
SYRINGE (ML) INTRAVENOUS
Status: DISCONTINUED | OUTPATIENT
Start: 2024-11-06 | End: 2024-11-06 | Stop reason: SDUPTHER

## 2024-11-06 RX ORDER — LEVOTHYROXINE SODIUM 50 UG/1
50 TABLET ORAL DAILY
Status: DISCONTINUED | OUTPATIENT
Start: 2024-11-07 | End: 2024-11-07 | Stop reason: HOSPADM

## 2024-11-06 RX ORDER — BUPIVACAINE HYDROCHLORIDE AND EPINEPHRINE 5; 5 MG/ML; UG/ML
INJECTION, SOLUTION EPIDURAL; INTRACAUDAL; PERINEURAL PRN
Status: DISCONTINUED | OUTPATIENT
Start: 2024-11-06 | End: 2024-11-06 | Stop reason: ALTCHOICE

## 2024-11-06 RX ORDER — LIDOCAINE HYDROCHLORIDE 20 MG/ML
INJECTION, SOLUTION EPIDURAL; INFILTRATION; INTRACAUDAL; PERINEURAL
Status: DISCONTINUED | OUTPATIENT
Start: 2024-11-06 | End: 2024-11-06 | Stop reason: SDUPTHER

## 2024-11-06 RX ORDER — SODIUM CHLORIDE 0.9 % (FLUSH) 0.9 %
5-40 SYRINGE (ML) INJECTION EVERY 12 HOURS SCHEDULED
Status: DISCONTINUED | OUTPATIENT
Start: 2024-11-06 | End: 2024-11-07 | Stop reason: HOSPADM

## 2024-11-06 RX ORDER — PROMETHAZINE HYDROCHLORIDE 12.5 MG/1
12.5 TABLET ORAL EVERY 6 HOURS PRN
Status: DISCONTINUED | OUTPATIENT
Start: 2024-11-06 | End: 2024-11-07 | Stop reason: HOSPADM

## 2024-11-06 RX ORDER — SENNA AND DOCUSATE SODIUM 50; 8.6 MG/1; MG/1
1 TABLET, FILM COATED ORAL 2 TIMES DAILY
Status: DISCONTINUED | OUTPATIENT
Start: 2024-11-06 | End: 2024-11-07 | Stop reason: HOSPADM

## 2024-11-06 RX ORDER — SODIUM CHLORIDE 9 MG/ML
INJECTION, SOLUTION INTRAVENOUS CONTINUOUS
Status: DISCONTINUED | OUTPATIENT
Start: 2024-11-06 | End: 2024-11-07

## 2024-11-06 RX ORDER — OXYBUTYNIN CHLORIDE 10 MG/1
10 TABLET, EXTENDED RELEASE ORAL DAILY
Status: DISCONTINUED | OUTPATIENT
Start: 2024-11-06 | End: 2024-11-07 | Stop reason: HOSPADM

## 2024-11-06 RX ORDER — TIZANIDINE 2 MG/1
2 TABLET ORAL EVERY 8 HOURS PRN
Qty: 60 TABLET | Refills: 0 | Status: SHIPPED | OUTPATIENT
Start: 2024-11-06

## 2024-11-06 RX ORDER — PROPOFOL 10 MG/ML
INJECTION, EMULSION INTRAVENOUS
Status: DISCONTINUED | OUTPATIENT
Start: 2024-11-06 | End: 2024-11-06 | Stop reason: SDUPTHER

## 2024-11-06 RX ORDER — ROSUVASTATIN CALCIUM 10 MG/1
20 TABLET, COATED ORAL NIGHTLY
Status: DISCONTINUED | OUTPATIENT
Start: 2024-11-06 | End: 2024-11-07 | Stop reason: HOSPADM

## 2024-11-06 RX ORDER — OXYCODONE HYDROCHLORIDE 5 MG/1
5 TABLET ORAL
Status: COMPLETED | OUTPATIENT
Start: 2024-11-06 | End: 2024-11-06

## 2024-11-06 RX ORDER — MORPHINE SULFATE 2 MG/ML
2 INJECTION, SOLUTION INTRAMUSCULAR; INTRAVENOUS
Status: DISCONTINUED | OUTPATIENT
Start: 2024-11-06 | End: 2024-11-07 | Stop reason: HOSPADM

## 2024-11-06 RX ORDER — SODIUM CHLORIDE 0.9 % (FLUSH) 0.9 %
5-40 SYRINGE (ML) INJECTION EVERY 12 HOURS SCHEDULED
Status: DISCONTINUED | OUTPATIENT
Start: 2024-11-06 | End: 2024-11-06 | Stop reason: HOSPADM

## 2024-11-06 RX ORDER — LIDOCAINE HYDROCHLORIDE 10 MG/ML
1 INJECTION, SOLUTION EPIDURAL; INFILTRATION; INTRACAUDAL; PERINEURAL
Status: DISCONTINUED | OUTPATIENT
Start: 2024-11-07 | End: 2024-11-06 | Stop reason: HOSPADM

## 2024-11-06 RX ORDER — CEFAZOLIN SODIUM/WATER 2 G/20 ML
2000 SYRINGE (ML) INTRAVENOUS EVERY 8 HOURS
Status: COMPLETED | OUTPATIENT
Start: 2024-11-06 | End: 2024-11-07

## 2024-11-06 RX ORDER — DEXAMETHASONE SODIUM PHOSPHATE 10 MG/ML
INJECTION, SOLUTION INTRAMUSCULAR; INTRAVENOUS
Status: DISCONTINUED | OUTPATIENT
Start: 2024-11-06 | End: 2024-11-06 | Stop reason: SDUPTHER

## 2024-11-06 RX ORDER — ROCURONIUM BROMIDE 10 MG/ML
INJECTION, SOLUTION INTRAVENOUS
Status: DISCONTINUED | OUTPATIENT
Start: 2024-11-06 | End: 2024-11-06

## 2024-11-06 RX ORDER — METOPROLOL TARTRATE 25 MG/1
25 TABLET, FILM COATED ORAL 2 TIMES DAILY
Status: DISCONTINUED | OUTPATIENT
Start: 2024-11-06 | End: 2024-11-07 | Stop reason: HOSPADM

## 2024-11-06 RX ORDER — TIZANIDINE 2 MG/1
2 TABLET ORAL EVERY 8 HOURS PRN
Status: DISCONTINUED | OUTPATIENT
Start: 2024-11-06 | End: 2024-11-07 | Stop reason: HOSPADM

## 2024-11-06 RX ORDER — FENTANYL CITRATE 50 UG/ML
25 INJECTION, SOLUTION INTRAMUSCULAR; INTRAVENOUS EVERY 5 MIN PRN
Status: DISCONTINUED | OUTPATIENT
Start: 2024-11-06 | End: 2024-11-06 | Stop reason: HOSPADM

## 2024-11-06 RX ORDER — HYDROCODONE BITARTRATE AND ACETAMINOPHEN 5; 325 MG/1; MG/1
1-2 TABLET ORAL EVERY 4 HOURS PRN
Qty: 60 TABLET | Refills: 0 | Status: SHIPPED | OUTPATIENT
Start: 2024-11-06 | End: 2024-11-13

## 2024-11-06 RX ORDER — SODIUM CHLORIDE 9 MG/ML
INJECTION, SOLUTION INTRAVENOUS PRN
Status: DISCONTINUED | OUTPATIENT
Start: 2024-11-06 | End: 2024-11-06 | Stop reason: HOSPADM

## 2024-11-06 RX ORDER — MEPERIDINE HYDROCHLORIDE 50 MG/ML
12.5 INJECTION INTRAMUSCULAR; INTRAVENOUS; SUBCUTANEOUS EVERY 5 MIN PRN
Status: DISCONTINUED | OUTPATIENT
Start: 2024-11-06 | End: 2024-11-06 | Stop reason: HOSPADM

## 2024-11-06 RX ORDER — PROCHLORPERAZINE EDISYLATE 5 MG/ML
10 INJECTION INTRAMUSCULAR; INTRAVENOUS
Status: DISCONTINUED | OUTPATIENT
Start: 2024-11-06 | End: 2024-11-06 | Stop reason: HOSPADM

## 2024-11-06 RX ORDER — HYDROCODONE BITARTRATE AND ACETAMINOPHEN 5; 325 MG/1; MG/1
1 TABLET ORAL EVERY 4 HOURS PRN
Status: DISCONTINUED | OUTPATIENT
Start: 2024-11-06 | End: 2024-11-07 | Stop reason: HOSPADM

## 2024-11-06 RX ORDER — AMLODIPINE BESYLATE 10 MG/1
10 TABLET ORAL DAILY
Status: DISCONTINUED | OUTPATIENT
Start: 2024-11-07 | End: 2024-11-07 | Stop reason: HOSPADM

## 2024-11-06 RX ORDER — SODIUM CHLORIDE 9 MG/ML
INJECTION, SOLUTION INTRAVENOUS PRN
Status: DISCONTINUED | OUTPATIENT
Start: 2024-11-06 | End: 2024-11-07 | Stop reason: HOSPADM

## 2024-11-06 RX ORDER — SODIUM CHLORIDE 0.9 % (FLUSH) 0.9 %
5-40 SYRINGE (ML) INJECTION PRN
Status: DISCONTINUED | OUTPATIENT
Start: 2024-11-06 | End: 2024-11-06 | Stop reason: HOSPADM

## 2024-11-06 RX ORDER — HYDROXYZINE HYDROCHLORIDE 10 MG/1
10 TABLET, FILM COATED ORAL EVERY 8 HOURS PRN
Status: DISCONTINUED | OUTPATIENT
Start: 2024-11-06 | End: 2024-11-07 | Stop reason: HOSPADM

## 2024-11-06 RX ADMIN — SODIUM CHLORIDE: 9 INJECTION, SOLUTION INTRAVENOUS at 14:07

## 2024-11-06 RX ADMIN — Medication 2000 MG: at 12:54

## 2024-11-06 RX ADMIN — METOPROLOL TARTRATE 25 MG: 25 TABLET, FILM COATED ORAL at 20:33

## 2024-11-06 RX ADMIN — ROCURONIUM BROMIDE 40 MG: 10 INJECTION, SOLUTION INTRAVENOUS at 12:52

## 2024-11-06 RX ADMIN — SODIUM CHLORIDE: 9 INJECTION, SOLUTION INTRAVENOUS at 10:47

## 2024-11-06 RX ADMIN — EPHEDRINE SULFATE 15 MG: 5 INJECTION INTRAVENOUS at 14:18

## 2024-11-06 RX ADMIN — FENTANYL CITRATE 25 MCG: 50 INJECTION INTRAMUSCULAR; INTRAVENOUS at 14:48

## 2024-11-06 RX ADMIN — SUGAMMADEX 200 MG: 100 INJECTION, SOLUTION INTRAVENOUS at 14:23

## 2024-11-06 RX ADMIN — FENTANYL CITRATE 50 MCG: 50 INJECTION INTRAMUSCULAR; INTRAVENOUS at 13:04

## 2024-11-06 RX ADMIN — FENTANYL CITRATE 25 MCG: 50 INJECTION INTRAMUSCULAR; INTRAVENOUS at 14:55

## 2024-11-06 RX ADMIN — FENTANYL CITRATE 25 MCG: 50 INJECTION INTRAMUSCULAR; INTRAVENOUS at 15:19

## 2024-11-06 RX ADMIN — EPHEDRINE SULFATE 12.5 MG: 5 INJECTION INTRAVENOUS at 13:27

## 2024-11-06 RX ADMIN — ROSUVASTATIN CALCIUM 20 MG: 10 TABLET, FILM COATED ORAL at 20:33

## 2024-11-06 RX ADMIN — POLYETHYLENE GLYCOL 3350 17 G: 17 POWDER, FOR SOLUTION ORAL at 17:54

## 2024-11-06 RX ADMIN — EPHEDRINE SULFATE 5 MG: 5 INJECTION INTRAVENOUS at 13:02

## 2024-11-06 RX ADMIN — SENNOSIDES AND DOCUSATE SODIUM 1 TABLET: 50; 8.6 TABLET ORAL at 20:34

## 2024-11-06 RX ADMIN — Medication 2000 MG: at 21:17

## 2024-11-06 RX ADMIN — EPHEDRINE SULFATE 10 MG: 5 INJECTION INTRAVENOUS at 14:01

## 2024-11-06 RX ADMIN — TIZANIDINE 2 MG: 2 TABLET ORAL at 17:54

## 2024-11-06 RX ADMIN — PROPOFOL 130 MG: 10 INJECTION, EMULSION INTRAVENOUS at 12:52

## 2024-11-06 RX ADMIN — FENTANYL CITRATE 100 MCG: 50 INJECTION INTRAMUSCULAR; INTRAVENOUS at 12:52

## 2024-11-06 RX ADMIN — LIDOCAINE HYDROCHLORIDE 60 MG: 20 INJECTION, SOLUTION EPIDURAL; INFILTRATION; INTRACAUDAL; PERINEURAL at 12:52

## 2024-11-06 RX ADMIN — EPHEDRINE SULFATE 7.5 MG: 5 INJECTION INTRAVENOUS at 13:04

## 2024-11-06 RX ADMIN — OXYCODONE HYDROCHLORIDE 5 MG: 5 TABLET ORAL at 15:31

## 2024-11-06 RX ADMIN — OXYBUTYNIN CHLORIDE 10 MG: 10 TABLET, EXTENDED RELEASE ORAL at 18:22

## 2024-11-06 RX ADMIN — DEXAMETHASONE SODIUM PHOSPHATE 6 MG: 10 INJECTION, SOLUTION INTRAMUSCULAR; INTRAVENOUS at 20:34

## 2024-11-06 RX ADMIN — DEXAMETHASONE SODIUM PHOSPHATE 10 MG: 10 INJECTION, SOLUTION INTRAMUSCULAR; INTRAVENOUS at 12:55

## 2024-11-06 RX ADMIN — FENTANYL CITRATE 50 MCG: 50 INJECTION INTRAMUSCULAR; INTRAVENOUS at 14:23

## 2024-11-06 RX ADMIN — ALBUMIN (HUMAN) 25 G: 12.5 INJECTION, SOLUTION INTRAVENOUS at 13:06

## 2024-11-06 RX ADMIN — SODIUM CHLORIDE: 9 INJECTION, SOLUTION INTRAVENOUS at 17:31

## 2024-11-06 ASSESSMENT — PAIN DESCRIPTION - PAIN TYPE
TYPE: SURGICAL PAIN
TYPE: SURGICAL PAIN

## 2024-11-06 ASSESSMENT — PAIN DESCRIPTION - ORIENTATION
ORIENTATION: LOWER
ORIENTATION: LOWER

## 2024-11-06 ASSESSMENT — PAIN DESCRIPTION - ONSET
ONSET: ON-GOING
ONSET: SUDDEN

## 2024-11-06 ASSESSMENT — PAIN SCALES - GENERAL
PAINLEVEL_OUTOF10: 4
PAINLEVEL_OUTOF10: 5
PAINLEVEL_OUTOF10: 2
PAINLEVEL_OUTOF10: 4
PAINLEVEL_OUTOF10: 3
PAINLEVEL_OUTOF10: 4

## 2024-11-06 ASSESSMENT — PAIN DESCRIPTION - FREQUENCY
FREQUENCY: CONTINUOUS
FREQUENCY: CONTINUOUS

## 2024-11-06 ASSESSMENT — PAIN DESCRIPTION - LOCATION
LOCATION: BACK
LOCATION: BACK

## 2024-11-06 ASSESSMENT — PAIN DESCRIPTION - DESCRIPTORS
DESCRIPTORS: ACHING;SPASM
DESCRIPTORS: SORE

## 2024-11-06 ASSESSMENT — PAIN - FUNCTIONAL ASSESSMENT
PAIN_FUNCTIONAL_ASSESSMENT: PREVENTS OR INTERFERES SOME ACTIVE ACTIVITIES AND ADLS
PAIN_FUNCTIONAL_ASSESSMENT: FACE, LEGS, ACTIVITY, CRY, AND CONSOLABILITY (FLACC)
PAIN_FUNCTIONAL_ASSESSMENT: 0-10

## 2024-11-06 NOTE — BRIEF OP NOTE
Brief Postoperative Note      Patient: Palma Silva  YOB: 1945  MRN: [id number]    Date of Procedure: 11/6/2024    Pre-Op Diagnosis Codes:      Lumbar stenosis and Other intervertebral disc displacement, lumbar region [M51.26]  L4-5    Post-Op Diagnosis: Same       Procedure(s):  L 4-5 LUMBAR LAMINECTOMY POSTERIOR  WITH LEFT DISCECTOMY, C ARM    Surgeon(s):  Neli Verdugo MD    Assistant:  Surgical Assistant: Venkat Torres    Anesthesia: General    Estimated Blood Loss (mL): less than 50     Complications: None    Specimens:   * No specimens in log *    Implants:  * No implants in log *      Drains: * No LDAs found *    Findings:  Infection Present At Time Of Surgery (PATOS) (choose all levels that have infection present):  No infection present    Electronically signed by NELI VERDUGO MD on 11/6/2024 at 2:21 PM

## 2024-11-06 NOTE — ANESTHESIA PRE PROCEDURE
Cardiovascular:  Exercise tolerance: no interval change  (+) hypertension:, hyperlipidemia    (-)  angina    ECG reviewed    Rate: normal  Echocardiogram reviewed    Cleared by cardiology     Beta Blocker:  Dose within 24 Hrs         Neuro/Psych:               GI/Hepatic/Renal:        (-) GERD       Endo/Other:    (+) hypothyroidism::..                 Abdominal:             Vascular:          Other Findings:       Anesthesia Plan      general     ASA 3       Induction: intravenous.    MIPS: Postoperative opioids intended and Prophylactic antiemetics administered.  Anesthetic plan and risks discussed with patient.      Plan discussed with CRNA.                Spencer Garcia DO   11/6/2024

## 2024-11-06 NOTE — DISCHARGE INSTRUCTIONS
advised by your healthcare provider.  Chest pain or trouble breathing.  Pain or tenderness in your leg(s).  Increased pain, redness, swelling, bleeding, or foul-smelling drainage at the incision site.  Incision changes, separates or is hot to the touch.  Problems with the drain if you have one.  Itchy, swollen skin; skin rash.    Medicines, Diet, and Activity:   Refer to your discharge paperwork for further instructions

## 2024-11-06 NOTE — OP NOTE
Gelfoam  with thrombin, neuro patties, and FloSeal.  Wound was then  irrigated with sterile normal saline with bacitracin.  Closure with 0 Vicryl, 2-0  Vicryl, and a running 4-0 Monocryl suture with Dermabond glue.  Standard dressings were then applied.  She was then placed supine  on her bed, extubated, and taken to recovery room in stable  condition.    Electronically signed by NELI DOUGLAS MD on 11/6/2024 at 4:22 PM

## 2024-11-06 NOTE — ANESTHESIA POSTPROCEDURE EVALUATION
Department of Anesthesiology  Postprocedure Note    Patient: Palma Silva  MRN: [id number]  YOB: 1945  Date of evaluation: 11/6/2024    Procedure Summary       Date: 11/06/24 Room / Location: [address]    Anesthesia Start: 1234 Anesthesia Stop: 1440    Procedure: L 4-5 LUMBAR LAMINECTOMY POSTERIOR  WITH LEFT DISCECTOMY, C ARM (Back) Diagnosis:       Other intervertebral disc displacement, lumbar region      (Other intervertebral disc displacement, lumbar region [M51.26])    Surgeons: Marty Verdugo MD Responsible Provider: Spencer Garcia DO    Anesthesia Type: general ASA Status: 3            Anesthesia Type: No value filed.    Lorena Phase I: Lorena Score: 8    Lorena Phase II:      Anesthesia Post Evaluation    Patient location during evaluation: PACU  Patient participation: complete - patient participated  Level of consciousness: awake and alert  Airway patency: patent  Nausea & Vomiting: no nausea and no vomiting  Cardiovascular status: hemodynamically stable  Respiratory status: acceptable  Hydration status: stable  Pain management: adequate    No notable events documented.

## 2024-11-06 NOTE — PLAN OF CARE
Problem: Discharge Planning  Goal: Discharge to home or other facility with appropriate resources  Outcome: Progressing  Flowsheets (Taken 11/6/2024 1805)  Discharge to home or other facility with appropriate resources:   Identify barriers to discharge with patient and caregiver   Identify discharge learning needs (meds, wound care, etc)   Arrange for needed discharge resources and transportation as appropriate     Problem: Pain  Goal: Verbalizes/displays adequate comfort level or baseline comfort level  Outcome: Progressing     Problem: Safety - Adult  Goal: Free from fall injury  Outcome: Progressing     Problem: ABCDS Injury Assessment  Goal: Absence of physical injury  Outcome: Progressing

## 2024-11-06 NOTE — H&P
Interval H&P Note    Pt Name: Palma Silva  MRN: [id number]  YOB: 1945  Date of evaluation: 11/6/2024      [x] I have reviewed in [facility] the primary care progress note by Dr. Mcneil dated 10/18/2024, attached below for an Interval History and Physical note.     [x] I have examined  Palma Silva, a 79 y.o. female.There are no changes to the patient who is scheduled for L 4-5 LUMBAR LAMINECTOMY POSTERIOR  WITH LEFT DISCECTOMY by Marty Verdugo MD for Other intervertebral disc displacement, lumbar region. The patient denies new health changes, fever, chills, wheezing, cough, increased SOB, chest pain, open sores or wounds. Denies hx of diabetes. Denies any current blood thinning medications.     Patient received cardiac clearance for planned procedure- note in paper chart.    Vital signs: /89   Pulse 70   Temp 98.6 °F (37 °C) (Temporal)   Resp 18   Ht 1.664 m (5' 5.5\")   Wt 71.7 kg (158 lb 1.1 oz)   LMP 08/15/1983 (Exact Date)   SpO2 96%   BMI 25.90 kg/m²     Allergies:  Levaquin [levofloxacin in d5w], Iodine, and Penicillins    Medications:    Prior to Admission medications    Medication Sig Start Date End Date Taking? Authorizing Provider   rosuvastatin (CRESTOR) 20 MG tablet Take 1 tablet by mouth nightly 8/27/24  Yes Lalito Edwards S,    gabapentin (NEURONTIN) 100 MG capsule  8/9/24  Yes ProviderSveta MD   amLODIPine (NORVASC) 10 MG tablet Take 1 tablet by mouth daily 6/6/24  Yes Jamar Mcneil MD   levothyroxine (SYNTHROID) 50 MCG tablet Take 1 tablet by mouth daily 3/18/24  Yes Jamar Mcneil MD   metoprolol tartrate (LOPRESSOR) 25 MG tablet Take 1 tablet by mouth 2 times daily 3/18/24  Yes Jamar Mcneil MD   oxyBUTYnin (DITROPAN-XL) 10 MG extended release tablet Take 1 tablet by mouth daily 3/18/24  Yes Jamar Mcneil MD   furosemide (LASIX) 20 MG tablet TAKE 1 TABLET DAILY  Patient taking differently: daily as needed Rarely takes 7/17/23  Yes Jamar Mcneil,

## 2024-11-07 VITALS
SYSTOLIC BLOOD PRESSURE: 118 MMHG | RESPIRATION RATE: 16 BRPM | WEIGHT: 158.07 LBS | BODY MASS INDEX: 25.4 KG/M2 | TEMPERATURE: 98.2 F | HEIGHT: 66 IN | HEART RATE: 78 BPM | DIASTOLIC BLOOD PRESSURE: 64 MMHG | OXYGEN SATURATION: 95 %

## 2024-11-07 LAB
ANION GAP SERPL CALCULATED.3IONS-SCNC: 12 MMOL/L (ref 9–16)
BASOPHILS # BLD: 0 K/UL (ref 0–0.2)
BASOPHILS NFR BLD: 0 %
BUN SERPL-MCNC: 16 MG/DL (ref 8–23)
CALCIUM SERPL-MCNC: 8.5 MG/DL (ref 8.8–10.2)
CHLORIDE SERPL-SCNC: 109 MMOL/L (ref 98–107)
CO2 SERPL-SCNC: 21 MMOL/L (ref 20–31)
CREAT SERPL-MCNC: 0.9 MG/DL (ref 0.5–0.9)
EOSINOPHIL # BLD: 0 K/UL (ref 0–0.4)
EOSINOPHILS RELATIVE PERCENT: 0 % (ref 1–4)
ERYTHROCYTE [DISTWIDTH] IN BLOOD BY AUTOMATED COUNT: 13.5 % (ref 11.8–14.4)
GFR, ESTIMATED: 65 ML/MIN/1.73M2
GLUCOSE SERPL-MCNC: 161 MG/DL (ref 82–115)
HCT VFR BLD AUTO: 34.1 % (ref 36.3–47.1)
HGB BLD-MCNC: 11.3 G/DL (ref 11.9–15.1)
IMM GRANULOCYTES # BLD AUTO: 0.11 K/UL (ref 0–0.3)
IMM GRANULOCYTES NFR BLD: 1 %
LYMPHOCYTES NFR BLD: 0.45 K/UL (ref 1–4.8)
LYMPHOCYTES RELATIVE PERCENT: 4 % (ref 24–44)
MCH RBC QN AUTO: 29 PG (ref 25.2–33.5)
MCHC RBC AUTO-ENTMCNC: 33.1 G/DL (ref 28.4–34.8)
MCV RBC AUTO: 87.7 FL (ref 82.6–102.9)
MONOCYTES NFR BLD: 0.11 K/UL (ref 0.2–0.8)
MONOCYTES NFR BLD: 1 % (ref 1–7)
NEUTROPHILS NFR BLD: 94 % (ref 36–66)
NEUTS SEG NFR BLD: 10.53 K/UL (ref 1.8–7.7)
NRBC BLD-RTO: 0 PER 100 WBC
PLATELET # BLD AUTO: 188 K/UL (ref 138–453)
PMV BLD AUTO: 10.1 FL (ref 8.1–13.5)
POTASSIUM SERPL-SCNC: 4 MMOL/L (ref 3.7–5.3)
RBC # BLD AUTO: 3.89 M/UL (ref 3.95–5.11)
SODIUM SERPL-SCNC: 142 MMOL/L (ref 136–145)
WBC OTHER # BLD: 11.2 K/UL (ref 3.5–11.3)

## 2024-11-07 PROCEDURE — 6360000002 HC RX W HCPCS: Performed by: ORTHOPAEDIC SURGERY

## 2024-11-07 PROCEDURE — 6370000000 HC RX 637 (ALT 250 FOR IP): Performed by: ORTHOPAEDIC SURGERY

## 2024-11-07 PROCEDURE — 97530 THERAPEUTIC ACTIVITIES: CPT

## 2024-11-07 PROCEDURE — 36415 COLL VENOUS BLD VENIPUNCTURE: CPT

## 2024-11-07 PROCEDURE — 97166 OT EVAL MOD COMPLEX 45 MIN: CPT

## 2024-11-07 PROCEDURE — 80048 BASIC METABOLIC PNL TOTAL CA: CPT

## 2024-11-07 PROCEDURE — 97535 SELF CARE MNGMENT TRAINING: CPT

## 2024-11-07 PROCEDURE — 99232 SBSQ HOSP IP/OBS MODERATE 35: CPT | Performed by: INTERNAL MEDICINE

## 2024-11-07 PROCEDURE — 85025 COMPLETE CBC W/AUTO DIFF WBC: CPT

## 2024-11-07 PROCEDURE — 97116 GAIT TRAINING THERAPY: CPT

## 2024-11-07 PROCEDURE — 97110 THERAPEUTIC EXERCISES: CPT

## 2024-11-07 PROCEDURE — 2580000003 HC RX 258: Performed by: ORTHOPAEDIC SURGERY

## 2024-11-07 RX ADMIN — METOPROLOL TARTRATE 25 MG: 25 TABLET, FILM COATED ORAL at 08:22

## 2024-11-07 RX ADMIN — HYDROCODONE BITARTRATE AND ACETAMINOPHEN 1 TABLET: 5; 325 TABLET ORAL at 11:38

## 2024-11-07 RX ADMIN — SODIUM CHLORIDE, PRESERVATIVE FREE 10 ML: 5 INJECTION INTRAVENOUS at 08:23

## 2024-11-07 RX ADMIN — SENNOSIDES AND DOCUSATE SODIUM 1 TABLET: 50; 8.6 TABLET ORAL at 08:22

## 2024-11-07 RX ADMIN — DEXAMETHASONE SODIUM PHOSPHATE 6 MG: 10 INJECTION, SOLUTION INTRAMUSCULAR; INTRAVENOUS at 04:42

## 2024-11-07 RX ADMIN — LEVOTHYROXINE SODIUM 50 MCG: 0.05 TABLET ORAL at 08:22

## 2024-11-07 RX ADMIN — POLYETHYLENE GLYCOL 3350 17 G: 17 POWDER, FOR SOLUTION ORAL at 08:23

## 2024-11-07 RX ADMIN — AMLODIPINE BESYLATE 10 MG: 10 TABLET ORAL at 08:22

## 2024-11-07 RX ADMIN — Medication 2000 MG: at 04:43

## 2024-11-07 ASSESSMENT — ENCOUNTER SYMPTOMS
VOMITING: 0
NAUSEA: 0
SHORTNESS OF BREATH: 0
WHEEZING: 0
CONSTIPATION: 0
BACK PAIN: 1

## 2024-11-07 ASSESSMENT — PAIN DESCRIPTION - PAIN TYPE: TYPE: SURGICAL PAIN

## 2024-11-07 ASSESSMENT — PAIN SCALES - GENERAL
PAINLEVEL_OUTOF10: 0
PAINLEVEL_OUTOF10: 4

## 2024-11-07 ASSESSMENT — PAIN - FUNCTIONAL ASSESSMENT: PAIN_FUNCTIONAL_ASSESSMENT: ACTIVITIES ARE NOT PREVENTED

## 2024-11-07 ASSESSMENT — PAIN DESCRIPTION - DESCRIPTORS: DESCRIPTORS: ACHING

## 2024-11-07 ASSESSMENT — PAIN DESCRIPTION - LOCATION: LOCATION: BACK

## 2024-11-07 ASSESSMENT — PAIN DESCRIPTION - ORIENTATION: ORIENTATION: LOWER

## 2024-11-07 NOTE — PLAN OF CARE
Problem: Discharge Planning  Goal: Discharge to home or other facility with appropriate resources  11/6/2024 2250 by Sandra Pike RN  Outcome: Progressing  11/6/2024 1813 by Laureen Flores RN  Outcome: Progressing  Flowsheets (Taken 11/6/2024 1805)  Discharge to home or other facility with appropriate resources:   Identify barriers to discharge with patient and caregiver   Identify discharge learning needs (meds, wound care, etc)   Arrange for needed discharge resources and transportation as appropriate     Problem: Pain  Goal: Verbalizes/displays adequate comfort level or baseline comfort level  11/6/2024 2250 by Sandra Pike RN  Outcome: Progressing  11/6/2024 1813 by Laureen Flores RN  Outcome: Progressing     Problem: Safety - Adult  Goal: Free from fall injury  11/6/2024 2250 by Sandra Pike RN  Outcome: Progressing  11/6/2024 1813 by Laureen Flores RN  Outcome: Progressing     Problem: ABCDS Injury Assessment  Goal: Absence of physical injury  11/6/2024 2250 by Sandra Pike RN  Outcome: Progressing  11/6/2024 1813 by Laureen Flores RN  Outcome: Progressing

## 2024-11-07 NOTE — PLAN OF CARE
Patient is A&Ox4 is able to ambulate with brace independently. Patient is being discharged home with . Peripheral IV taken out, discharge instructions reviewed, questions answered. Belongings gathered and sent with patient.   Problem: Discharge Planning  Goal: Discharge to home or other facility with appropriate resources  11/7/2024 1134 by Dorothy Starks, RN  Outcome: Completed

## 2024-11-07 NOTE — CONSULTS
[facility]  Office: [phone]  Saman Soto DO, Shade Crane DO, Patrice Salinas DO, Jasbir Tomas DO, Clyde Paz MD, Miriam Chris MD, Tasia Fields MD, Kristie Da Silva MD,  Serafin Morel MD, Derrell Lane MD, Cyrus Nguyen MD,  Barron Mccormick DO, Frantz Verduzco MD, Howie Jara MD, Sean Soto DO, Joselyn Gomez MD,  Silver Banegas DO, Reanna Loaiza MD, Cheryl Perez MD, Arpita Mendoza MD, Ana Montgomery MD,  Oli Riggs MD, Adilson Vigil MD, Justin Palafox MD, Cj Del Valle MD, Abel Palomo MD, Migel Boone MD, Matty Hernández DO, Demarcus Turner DO, Zach Elmore MD,  Fidencio Wilkes MD, Shirley Waterhouse, CNP,  Jeanette Elizabeth, CNP, Adalberto Ashby, CNP,  Malgorzata Werner, DNP, Lawanda Hsu, CNP, Ling Lebron, CNP, Kamini Mayorga CNP, Demetra Chow, CNP, Arely Pablo, CNP, Vandana Suresh, PA-C, Vianey Castillo PA-C, Akila Bustillos, CNP, Venus Fields, CNP, Yara Guadarrama, CNP, Jannet Kirby, CNS, Palma Sorensen, CNP, Ruth Ann Sunshine, CNP, Tracy Schwab, CNP         [facility]   IN-PATIENT SERVICE   [facility]    CONSULTATION / HISTORY AND PHYSICAL EXAMINATION            Date:   11/6/2024  Patient name:  Palma Silva  Date of admission:  11/6/2024 10:05 AM  MRN:   [id number]  Account:  [id number]  YOB: 1945  PCP:    Jamar Mcneil MD  Room:   2023/2023-01  Code Status:    Full Code    Physician Requesting Consult: Marty Verdugo MD    Reason for Consult: Medical management    Chief Complaint:     No chief complaint on file.      History Obtained From:     patient, electronic medical record    History of Present Illness:     Patient presented for scheduled outpatient laminectomy of L4-L5.  She has a past medical history significant for hypertension, hypothyroidism, hyperlipidemia and chronic radicular lumbar pain.  We were consulted for medical management.    Past Medical History:     Past Medical History:   Diagnosis

## 2024-11-07 NOTE — PROGRESS NOTES
[facility]  Office: [phone]  Saman Soto DO, Shade Crane DO, Patrice Salinas DO, Jasbir Tomas DO, Clyde Paz MD, Miriam Chris MD, Tasia Fields MD, Kristie Da Silva MD,  Serafin Morel MD, Derrell Lane MD, Cyrus Nguyen MD,  Barron Mccormick DO, Frantz Verduzco MD, Howie Jara MD, Sean Soto DO, Joselyn Gomez MD,  Silver Banegas DO, Reanna Loaiza MD, Cheryl Perez MD, Arpita Mendoza MD, Ana Montgomery MD,  Oli Riggs MD, Adilson Vigil MD, Justin Palafox MD, Cj Del Valle MD, Abel Palomo MD, Migel Boone MD, Adalberto Hall DO, Fidencio Wilkes MD, Shirley Waterhouse, CNP,  Jeanette Elizabeth, CNP, Adalberto Ashby, CNP,  Malgorzata Werner, RUSSELL, Lawanda Hsu, CNP, Ling Lebron, CNP, Demetra Chow, CNP, Arely Pablo, CNP, GURDEEP JamesC, GURDEEP BenitesC, Akila Bustillos, CNP, Greta Holley, CNP,  Hien Herbert, CNP, Yara Guadarrama, CNP,  Ruth Ann Sunshine, CNP, Tracey Armstrong, CNP         [facility]   IN-PATIENT SERVICE   [facility]    Progress Note    11/7/2024    8:16 AM    Name:   Palma Silva  MRN:     [id number]     Acct:      [id number]   Room:   2023/2023-01  IP Day:  0  Admit Date:  11/6/2024 10:05 AM    PCP:   Jamar Mcneil MD  Code Status:  Full Code    Subjective:     C/C: Back pain    Interval History Status: improved.     Patient sitting up in bed, urinary work with therapy.  Denies any complaints of chest pain, shortness of breath, nausea vomiting, fevers or chills or acute complaints    Brief History:     This is an 79-year-old female that presents with complaint of back pain for L4/L5 laminectomy.  Will follow for postoperative medical management primary of hypertension, dyslipidemia, hypothyroidism as well as radicular back pain.    Review of Systems:     Constitutional:  negative for chills, fevers, sweats  Respiratory:  negative for cough, dyspnea on exertion, shortness of breath, wheezing  Cardiovascular:  
Occupational Therapy  Facility/Department: [facility] SURG  Occupational Therapy Initial Assessment    Name: Palma Silva  : 1945  MRN: [id number]  Date of Service: 2024    KENNETH De La Cruz reports patient is medically stable for therapy treatment this date.    Chart reviewed prior to treatment and patient is agreeable for therapy.  All lines intact and patient positioned comfortably at end of treatment.  All patient needs addressed prior to ending therapy session.       Discharge Recommendations:  Home with assist PRN  OT Equipment Recommendations  Equipment Needed: Yes  Mobility Devices: ADL Assistive Devices  ADL Assistive Devices: Shower Chair with back;Reacher;Sock-Aid Hard;Long-handled Shoe Horn;Long-handled Sponge       Pt is s/p:       Date of Procedure: 2024     Pre-Op Diagnosis Codes:      Lumbar stenosis and Other intervertebral disc displacement, lumbar region [M51.26]  L4-5     Post-Op Diagnosis: Same       Procedure(s):  L 4-5 LUMBAR LAMINECTOMY POSTERIOR  WITH LEFT DISCECTOMY, C ARM     Surgeon(s):  Marty Verdugo MD      Patient Diagnosis(es): The primary encounter diagnosis was Lumbar stenosis with neurogenic claudication. A diagnosis of Herniated lumbar intervertebral disc was also pertinent to this visit.  Past Medical History:  has a past medical history of Bacillus fragilis infection, Bronchiectasis (HCC), Chronic lower back pain, Chronic radicular lumbar pain, Degenerative disc disease, Hyperlipidemia, Hyperreflexic bladder, Hypertension, Hypothyroidism, Primary osteoarthritis of right knee, and Prolonged emergence from general anesthesia.  Past Surgical History:  has a past surgical history that includes Hysterectomy (); bronchoscopy (Left, 2012); other surgical history (Right, 2011); Breast surgery (Left, ); Colonoscopy (2013); Tonsillectomy; other surgical history (Right, 10/02/2015); Total knee arthroplasty (Right, 2017); pr arthrp kne condyle&platu 
[facility]  Attending Progress Note  11/7/2024  7:56 AM     Palma Silva    1945   [id number]      SUBJECTIVE:  doing well. Pain controlled. Has been up walking and voiding.  Denies leg pain.  No CP/SOB    OBJECTIVE      Physical      VITALS:  /64   Pulse 78   Temp 98.2 °F (36.8 °C) (Oral)   Resp 16   Ht 1.664 m (5' 5.5\")   Wt 71.7 kg (158 lb 1.1 oz)   LMP 08/15/1983 (Exact Date)   SpO2 95%   BMI 25.90 kg/m²     Dressing C/D/I    NEUROLOGIC: Alert and Oriented x 3.  Strength 5/5 HF, 5/5 Q, 5/5 TA, 5/5 EHL, 5/5 GS. 5/5 D, 5/5 B, 5/5 T, 5/5 WE, 5/5 WF, 5/5 I                                                                  Sensation intact.     Data  CBC with Differential:    Lab Results   Component Value Date/Time    WBC 11.2 11/07/2024 05:35 AM    RBC 3.89 11/07/2024 05:35 AM    HGB 11.3 11/07/2024 05:35 AM    HCT 34.1 11/07/2024 05:35 AM     11/07/2024 05:35 AM    MCV 87.7 11/07/2024 05:35 AM    MCH 29.0 11/07/2024 05:35 AM    MCHC 33.1 11/07/2024 05:35 AM    RDW 13.5 11/07/2024 05:35 AM    LYMPHOPCT 4 11/07/2024 05:35 AM    MONOPCT 1 11/07/2024 05:35 AM    EOSPCT 0 11/07/2024 05:35 AM    BASOPCT 0 11/07/2024 05:35 AM    MONOSABS 0.11 11/07/2024 05:35 AM    LYMPHSABS 0.45 11/07/2024 05:35 AM    EOSABS 0.00 11/07/2024 05:35 AM    BASOSABS 0.00 11/07/2024 05:35 AM    DIFFTYPE NOT REPORTED 02/05/2018 11:41 AM     BMP:    Lab Results   Component Value Date/Time     11/07/2024 05:35 AM    K 4.0 11/07/2024 05:35 AM     11/07/2024 05:35 AM    CO2 21 11/07/2024 05:35 AM    BUN 16 11/07/2024 05:35 AM    CREATININE 0.9 11/07/2024 05:35 AM    CALCIUM 8.5 11/07/2024 05:35 AM    GFRAA >60 09/12/2022 10:24 AM    LABGLOM 65 11/07/2024 05:35 AM    LABGLOM >60 09/08/2023 11:04 AM    GLUCOSE 161 11/07/2024 05:35 AM           Current Inpatient Medications    Current Facility-Administered Medications: amLODIPine (NORVASC) tablet 10 mg, 10 mg, Oral, Daily  gabapentin (NEURONTIN) 
PATIENT STATES PAIN IN BACK, DENIES NUMBNESS AND TINGLING. BILATERAL HAND GRASPS AND D/P FLEXION OF FEET MODERATE AND EQUAL  
Physical Therapy  Facility/Department: [facility] SURG  Physical Therapy Initial Assessment-day of surgery    Name: Palma Silva  : 1945  MRN: [id number]  Date of Service: 2024    Date of Procedure: 2024     SURGEON:  Marty Verdugo MD.     ANESTHESIA:  General endotracheal anesthesia.     PREOPERATIVE DIAGNOSIS:  L4-L5 lumbar spinal stenosis.                                                       L4-5 herniated disc     POSTOPERATIVE DIAGNOSIS:   L4-L5 lumbar spinal stenosis.                                                       L4-5 herniated disc     PROCEDURES:  1. L4-L5 laminectomy with partial medial facetectomies and      bilateral foraminotomies.  2.  L4-5 discectomy  2. Intraoperative use of C-arm fluoroscopy.         Patient Diagnosis(es): The primary encounter diagnosis was Lumbar stenosis with neurogenic claudication. A diagnosis of Herniated lumbar intervertebral disc was also pertinent to this visit.  Past Medical History:  has a past medical history of Bacillus fragilis infection, Bronchiectasis (HCC), Chronic lower back pain, Chronic radicular lumbar pain, Degenerative disc disease, Hyperlipidemia, Hyperreflexic bladder, Hypertension, Hypothyroidism, Primary osteoarthritis of right knee, and Prolonged emergence from general anesthesia.  Past Surgical History:  has a past surgical history that includes Hysterectomy (); bronchoscopy (Left, 2012); other surgical history (Right, 2011); Breast surgery (Left, ); Colonoscopy (2013); Tonsillectomy; other surgical history (Right, 10/02/2015); Total knee arthroplasty (Right, 2017); pr arthrp kne condyle&platu medial&lat compartments (Right, 2017); and joint replacement.    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 She is a 
Pt admitted to room #2023 from PACU  Oriented to room and call light/tv controls.  Bed in lowest position, wheels locked, 2/4 side rails up  Call light in reach, room free of clutter, adequate lighting provided.    
treatment to maximize return to PLOF  Activity Tolerance: Patient tolerated evaluation without incident    Goals  Patient Goals   Patient Goals : d/c home tomorrow  Short Term Goals  Time Frame for Short Term Goals: 6 visits:  Short Term Goal 1: Pt. to require SBA for bed mobility using log roll tech  Short Term Goal 2: Pt. to require SBA for sit to stand transfers with RW with correct hand placement 100% of time  Short Term Goal 3: Pt. to require SBA for gait with RW, 75 ft.  Short Term Goal 4: Pt. to safely negotiate 2 steps to prepare for entering home at discharge  Short Term Goal 5: Pt. to tolerate 25+ min. of PT daily for ther ex/ gait/ balance training/ functional mob. training  Additional Goals?: Yes  Short Term Goal 6: Pt. to demonstrate safe walker use for forward ambulation and turning with RW, approaching counters and reaching, approaching chairs fully before turning to sit (to avoid backwards stepping) and correct hand placement during sit <>stand transfers.    PLAN OF CARE/SAFETY  Physical Therapy Plan  General Plan: 2 times a day 7 days a week  Current Treatment Recommendations: Strengthening;ROM;Balance training;Functional mobility training;Transfer training;Endurance training;Gait training;Stair training;Home exercise program;Safety education & training;Patient/Caregiver education & training;Equipment evaluation, education, & procurement;Therapeutic activities  Safety Devices  Type of Devices: Gait belt;Call light within reach;Left in bed;Nurse notified    EDUCATION  Education  Education Given To: Patient  Education Provided: Precautions;Safety;Mobility Training;Transfer Training  Education Provided Comments: Patient education on precautions and requires 2 attempts to successfully verbalize precautions  Education Method: Verbal;Teach Back  Barriers to Learning: None  Education Outcome: Verbalized understanding;Continued education needed    Therapy Time   Individual Concurrent Group Co-treatment

## 2024-12-31 ENCOUNTER — OFFICE VISIT (OUTPATIENT)
Dept: PRIMARY CARE CLINIC | Age: 79
End: 2024-12-31
Payer: MEDICARE

## 2024-12-31 VITALS
HEART RATE: 74 BPM | WEIGHT: 162 LBS | DIASTOLIC BLOOD PRESSURE: 64 MMHG | BODY MASS INDEX: 26.55 KG/M2 | RESPIRATION RATE: 18 BRPM | OXYGEN SATURATION: 96 % | SYSTOLIC BLOOD PRESSURE: 122 MMHG | TEMPERATURE: 97.8 F

## 2024-12-31 DIAGNOSIS — J20.9 ACUTE BRONCHITIS, UNSPECIFIED ORGANISM: Primary | ICD-10-CM

## 2024-12-31 PROCEDURE — 99213 OFFICE O/P EST LOW 20 MIN: CPT | Performed by: FAMILY MEDICINE

## 2024-12-31 RX ORDER — PREDNISONE 20 MG/1
TABLET ORAL
Qty: 15 TABLET | Refills: 0 | Status: SHIPPED | OUTPATIENT
Start: 2024-12-31

## 2024-12-31 RX ORDER — DOXYCYCLINE HYCLATE 100 MG
100 TABLET ORAL 2 TIMES DAILY
Qty: 20 TABLET | Refills: 0 | Status: SHIPPED | OUTPATIENT
Start: 2024-12-31

## 2024-12-31 ASSESSMENT — ENCOUNTER SYMPTOMS
SINUS PAIN: 0
TROUBLE SWALLOWING: 0
ABDOMINAL PAIN: 0
EYE REDNESS: 0
DIARRHEA: 0
NAUSEA: 0
WHEEZING: 1
SORE THROAT: 1
SHORTNESS OF BREATH: 1
COUGH: 1
EYE DISCHARGE: 0
CONSTIPATION: 0
RHINORRHEA: 1
VOMITING: 0
SINUS PRESSURE: 0
CHEST TIGHTNESS: 1

## 2024-12-31 NOTE — PROGRESS NOTES
2024     Palma Silva (:  1945) is a 79 y.o. female, here for evaluation of the following medical concerns:    Cough  This is a new problem. The current episode started 1 to 4 weeks ago (has had cough and congestion for a little over a week). The problem has been gradually worsening. The problem occurs constantly. The cough is Non-productive (has chest congestion, but just can't bring it up). Associated symptoms include ear pain (feels pressure), myalgias, nasal congestion (slight), postnasal drip, rhinorrhea, a sore throat (started with a sore throat, but now feels feels more irritated from coughing), shortness of breath (on occasion) and wheezing. Pertinent negatives include no chest pain, chills, eye redness, fever, headaches or rash. Treatments tried: mucinex DM and tylenol, flonase. The treatment provided mild relief. There is no history of environmental allergies.     Did review patient's med list, allergies, social history,pmhx and pshx today as noted in the record.      Review of Systems   Constitutional:  Positive for fatigue. Negative for chills and fever.   HENT:  Positive for congestion, ear pain (feels pressure), postnasal drip, rhinorrhea and sore throat (started with a sore throat, but now feels feels more irritated from coughing). Negative for sinus pressure, sinus pain and trouble swallowing.    Eyes:  Negative for discharge and redness.   Respiratory:  Positive for cough, chest tightness, shortness of breath (on occasion) and wheezing.    Cardiovascular:  Negative for chest pain.   Gastrointestinal:  Negative for abdominal pain, constipation, diarrhea, nausea and vomiting.   Genitourinary:  Negative for dysuria, flank pain, frequency and urgency.   Musculoskeletal:  Positive for myalgias. Negative for arthralgias and neck pain.   Skin:  Negative for rash and wound.   Allergic/Immunologic: Negative for environmental allergies.   Neurological:  Negative for dizziness, weakness,

## 2025-01-14 ENCOUNTER — HOSPITAL ENCOUNTER (OUTPATIENT)
Age: 80
Discharge: HOME | End: 2025-01-14
Payer: MEDICARE

## 2025-01-14 DIAGNOSIS — E03.9 ACQUIRED HYPOTHYROIDISM: ICD-10-CM

## 2025-01-14 DIAGNOSIS — I10 ESSENTIAL HYPERTENSION: ICD-10-CM

## 2025-01-14 DIAGNOSIS — E78.49 OTHER HYPERLIPIDEMIA: ICD-10-CM

## 2025-01-14 LAB
ALBUMIN SERPL-MCNC: 3.9 G/DL (ref 3.5–5.2)
ALBUMIN/GLOB SERPL: 1.3 {RATIO} (ref 1–2.5)
ALP SERPL-CCNC: 113 U/L (ref 35–104)
ALT SERPL-CCNC: 24 U/L (ref 5–33)
ANION GAP SERPL CALCULATED.3IONS-SCNC: 10 MMOL/L (ref 9–17)
AST SERPL-CCNC: 23 U/L
BILIRUB SERPL-MCNC: 0.5 MG/DL (ref 0.3–1.2)
BUN SERPL-MCNC: 18 MG/DL (ref 8–23)
BUN/CREAT SERPL: 23 (ref 9–20)
CALCIUM SERPL-MCNC: 9.6 MG/DL (ref 8.6–10.4)
CHLORIDE SERPL-SCNC: 104 MMOL/L (ref 98–107)
CHOLEST SERPL-MCNC: 202 MG/DL (ref 0–199)
CHOLESTEROL/HDL RATIO: 2.7
CO2 SERPL-SCNC: 28 MMOL/L (ref 20–31)
CREAT SERPL-MCNC: 0.8 MG/DL (ref 0.5–0.9)
GFR, ESTIMATED: 74 ML/MIN/1.73M2
GLUCOSE SERPL-MCNC: 99 MG/DL (ref 70–99)
HDLC SERPL-MCNC: 76 MG/DL
LDLC SERPL CALC-MCNC: 102 MG/DL (ref 0–100)
POTASSIUM SERPL-SCNC: 4.3 MMOL/L (ref 3.7–5.3)
PROT SERPL-MCNC: 7 G/DL (ref 6.4–8.3)
SODIUM SERPL-SCNC: 142 MMOL/L (ref 135–144)
T4 FREE SERPL-MCNC: 1.4 NG/DL (ref 0.9–1.7)
TRIGL SERPL-MCNC: 119 MG/DL (ref 0–149)
TSH SERPL DL<=0.05 MIU/L-ACNC: 1.26 UIU/ML (ref 0.3–5)
VLDLC SERPL CALC-MCNC: 24 MG/DL (ref 1–30)

## 2025-01-14 PROCEDURE — 80061 LIPID PANEL: CPT

## 2025-01-14 PROCEDURE — 84443 ASSAY THYROID STIM HORMONE: CPT

## 2025-01-14 PROCEDURE — 80053 COMPREHEN METABOLIC PANEL: CPT

## 2025-01-14 PROCEDURE — 84439 ASSAY OF FREE THYROXINE: CPT

## 2025-01-14 PROCEDURE — 36415 COLL VENOUS BLD VENIPUNCTURE: CPT

## 2025-01-16 ENCOUNTER — OFFICE VISIT (OUTPATIENT)
Dept: INTERNAL MEDICINE | Age: 80
End: 2025-01-16
Payer: MEDICARE

## 2025-01-16 VITALS
RESPIRATION RATE: 16 BRPM | SYSTOLIC BLOOD PRESSURE: 108 MMHG | BODY MASS INDEX: 25.88 KG/M2 | HEART RATE: 82 BPM | DIASTOLIC BLOOD PRESSURE: 62 MMHG | OXYGEN SATURATION: 98 % | WEIGHT: 161 LBS | HEIGHT: 66 IN

## 2025-01-16 DIAGNOSIS — Z00.00 MEDICARE ANNUAL WELLNESS VISIT, SUBSEQUENT: ICD-10-CM

## 2025-01-16 DIAGNOSIS — E03.9 ACQUIRED HYPOTHYROIDISM: ICD-10-CM

## 2025-01-16 DIAGNOSIS — Z00.00 GENERAL MEDICAL EXAM: Primary | ICD-10-CM

## 2025-01-16 DIAGNOSIS — E78.49 OTHER HYPERLIPIDEMIA: ICD-10-CM

## 2025-01-16 DIAGNOSIS — F32.A DEPRESSION, UNSPECIFIED DEPRESSION TYPE: ICD-10-CM

## 2025-01-16 DIAGNOSIS — I10 PRIMARY HYPERTENSION: ICD-10-CM

## 2025-01-16 PROCEDURE — 90653 IIV ADJUVANT VACCINE IM: CPT | Performed by: INTERNAL MEDICINE

## 2025-01-16 PROCEDURE — 99212 OFFICE O/P EST SF 10 MIN: CPT | Performed by: INTERNAL MEDICINE

## 2025-01-16 SDOH — ECONOMIC STABILITY: FOOD INSECURITY: WITHIN THE PAST 12 MONTHS, YOU WORRIED THAT YOUR FOOD WOULD RUN OUT BEFORE YOU GOT MONEY TO BUY MORE.: NEVER TRUE

## 2025-01-16 SDOH — ECONOMIC STABILITY: FOOD INSECURITY: WITHIN THE PAST 12 MONTHS, THE FOOD YOU BOUGHT JUST DIDN'T LAST AND YOU DIDN'T HAVE MONEY TO GET MORE.: NEVER TRUE

## 2025-01-16 ASSESSMENT — ENCOUNTER SYMPTOMS
EYE PAIN: 0
DIARRHEA: 0
NAUSEA: 0
COUGH: 0
VOMITING: 0
ABDOMINAL PAIN: 0
BACK PAIN: 0
SHORTNESS OF BREATH: 0
CONSTIPATION: 0
BLOOD IN STOOL: 0

## 2025-01-16 ASSESSMENT — LIFESTYLE VARIABLES
HOW MANY STANDARD DRINKS CONTAINING ALCOHOL DO YOU HAVE ON A TYPICAL DAY: PATIENT DOES NOT DRINK
HOW OFTEN DO YOU HAVE A DRINK CONTAINING ALCOHOL: NEVER

## 2025-01-16 ASSESSMENT — PATIENT HEALTH QUESTIONNAIRE - PHQ9
SUM OF ALL RESPONSES TO PHQ QUESTIONS 1-9: 2
SUM OF ALL RESPONSES TO PHQ QUESTIONS 1-9: 2
1. LITTLE INTEREST OR PLEASURE IN DOING THINGS: SEVERAL DAYS
SUM OF ALL RESPONSES TO PHQ QUESTIONS 1-9: 2
SUM OF ALL RESPONSES TO PHQ9 QUESTIONS 1 & 2: 2
SUM OF ALL RESPONSES TO PHQ QUESTIONS 1-9: 2
2. FEELING DOWN, DEPRESSED OR HOPELESS: SEVERAL DAYS

## 2025-01-16 NOTE — PROGRESS NOTES
[facility]  [facility] INTER[facility] MED A DEPARTMENT OF [facility]  [address] [facility] [address]  Dept: [phone]  Dept Fax: [phone]  Loc: [phone]     Palma Silva is a 79 y.o. female who presents today for her medical conditions/complaintsas noted below.  Palma Silva is c/o of   Chief Complaint   Patient presents with    Medicare AWV    Hypertension    Hyperlipidemia    Hypothyroidism         HPI:     Hypertension  This is a chronic problem. The current episode started more than 1 year ago. The problem has been waxing and waning since onset. The problem is controlled. Pertinent negatives include no chest pain, headaches, neck pain, palpitations or shortness of breath.   Hyperlipidemia  This is a chronic problem. The current episode started more than 1 year ago. The problem is controlled. Recent lipid tests were reviewed and are variable. Pertinent negatives include no chest pain or shortness of breath.   Other  This is a recurrent (3-General Medical exam, 4-hypothyroidism acquired) problem. The current episode started today. The problem occurs intermittently. The problem has been waxing and waning. Pertinent negatives include no abdominal pain, arthralgias, chest pain, chills, coughing, fever, headaches, nausea, neck pain, numbness, rash, vomiting or weakness.       No results found for: \"LABA1C\"         No components found for: \"LABMICR\"  No components found for: \"LDLCHOLESTEROL\", \"LDLCALC\"      AST (U/L)   Date Value   01/14/2025 23     ALT (U/L)   Date Value   01/14/2025 24     BUN (mg/dL)   Date Value   01/14/2025 18     BP Readings from Last 3 Encounters:   01/16/25 108/62   12/31/24 122/64   11/07/24 118/64              Past Medical History:   Diagnosis Date    Bacillus fragilis infection 2012    treated one year Dr Ramsey/JAZMINE    Bronchiectasis ([facility]) 2012    Chronic lower back pain 02/05/2016    Chronic radicular lumbar pain 10/02/2015    Degenerative disc 
(SENOKOT-S) 8.6-50 MG tablet Take 1 tablet by mouth daily  Marty Verdugo MD   rosuvastatin (CRESTOR) 20 MG tablet Take 1 tablet by mouth nightly  Lalito Edwards DO   gabapentin (NEURONTIN) 100 MG capsule   Sveta Cuenca MD   amLODIPine (NORVASC) 10 MG tablet Take 1 tablet by mouth daily  Jamar Mcneil MD   levothyroxine (SYNTHROID) 50 MCG tablet Take 1 tablet by mouth daily  Jamar Mcneil MD   metoprolol tartrate (LOPRESSOR) 25 MG tablet Take 1 tablet by mouth 2 times daily  Jamar Mcneil MD   oxyBUTYnin (DITROPAN-XL) 10 MG extended release tablet Take 1 tablet by mouth daily  Jamar Mcneil MD   furosemide (LASIX) 20 MG tablet TAKE 1 TABLET DAILY  Patient taking differently: daily as needed Rarely takes  Jamar Mcneil MD   Calcium Carb-Cholecalciferol (CALCIUM 600+D3 PO) Take 2 capsules by mouth daily  Sveta Cuenca MD   Multiple Vitamins-Minerals (MULTIVITAMIN PO) Take 2 tablets by mouth every morning   Sveta Cuenca MD       CareTeam (Including outside providers/suppliers regularly involved in providing care):   Patient Care Team:  Jamar Mcneil MD as PCP - General (Internal Medicine)  Jamar Mcneil MD as PCP - Empaneled Provider  Desmond Silveira MD as Consulting Physician (Pulmonology)  Lupe Parker MD (Physical Medicine and Rehab)  Ross Vera MD as Surgeon (Orthopedic Surgery)  Yaakov Sanchez DO as Referring Physician (Sports Medicine)     Recommendations for Preventive Services Due: see orders and patient instructions/AVS.  Recommended screening schedule for the next 5-10 years is provided to the patient in written form: see Patient Instructions/AVS.     Reviewed and updated this visit:  Tobacco  Allergies  Meds  Problems  Med Hx  Surg Hx  Soc Hx  Fam Hx           I, Dr. Mcneil, directly supervised the performance of this Medicare annual wellness visit.

## 2025-01-16 NOTE — PATIENT INSTRUCTIONS
contact your doctor if you have any problems.  Where can you learn more?  Go to [email or web address].net/patientEd and enter F075 to learn more about \"A Healthy Heart: Care Instructions.\"  Current as of: July 31, 2024  Content Version: 14.3  © 2024 [employer].   Care instructions adapted under license by [employer]. If you have questions about a medical condition or this instruction, always ask your healthcare professional. [employer], [email or web address], disclaims any warranty or liability for your use of this information.    Personalized Preventive Plan for Palma Silva - 1/16/2025  Medicare offers a range of preventive health benefits. Some of the tests and screenings are paid in full while other may be subject to a deductible, co-insurance, and/or copay.  Some of these benefits include a comprehensive review of your medical history including lifestyle, illnesses that may run in your family, and various assessments and screenings as appropriate.  After reviewing your medical record and screening and assessments performed today your provider may have ordered immunizations, labs, imaging, and/or referrals for you.  A list of these orders (if applicable) as well as your Preventive Care list are included within your After Visit Summary for your review.

## 2025-02-28 DIAGNOSIS — N32.89 SPASTIC BLADDER: ICD-10-CM

## 2025-02-28 DIAGNOSIS — E03.9 ACQUIRED HYPOTHYROIDISM: ICD-10-CM

## 2025-02-28 DIAGNOSIS — I10 ESSENTIAL HYPERTENSION: ICD-10-CM

## 2025-02-28 RX ORDER — LEVOTHYROXINE SODIUM 50 MCG
50 TABLET ORAL DAILY
Qty: 90 TABLET | Refills: 3 | Status: SHIPPED | OUTPATIENT
Start: 2025-02-28

## 2025-02-28 RX ORDER — METOPROLOL TARTRATE 25 MG/1
25 TABLET, FILM COATED ORAL 2 TIMES DAILY
Qty: 180 TABLET | Refills: 3 | Status: SHIPPED | OUTPATIENT
Start: 2025-02-28

## 2025-02-28 RX ORDER — OXYBUTYNIN CHLORIDE 10 MG/1
10 TABLET, EXTENDED RELEASE ORAL DAILY
Qty: 90 TABLET | Refills: 3 | Status: SHIPPED | OUTPATIENT
Start: 2025-02-28

## 2025-03-26 ENCOUNTER — OFFICE VISIT (OUTPATIENT)
Dept: CARDIOLOGY | Age: 80
End: 2025-03-26
Payer: MEDICARE

## 2025-03-26 VITALS
SYSTOLIC BLOOD PRESSURE: 120 MMHG | HEIGHT: 65 IN | BODY MASS INDEX: 26.82 KG/M2 | DIASTOLIC BLOOD PRESSURE: 70 MMHG | WEIGHT: 161 LBS

## 2025-03-26 DIAGNOSIS — I49.3 PVC'S (PREMATURE VENTRICULAR CONTRACTIONS): ICD-10-CM

## 2025-03-26 DIAGNOSIS — I10 PRIMARY HYPERTENSION: Primary | ICD-10-CM

## 2025-03-26 DIAGNOSIS — E78.5 HYPERLIPIDEMIA, UNSPECIFIED HYPERLIPIDEMIA TYPE: ICD-10-CM

## 2025-03-26 PROCEDURE — 99214 OFFICE O/P EST MOD 30 MIN: CPT | Performed by: INTERNAL MEDICINE

## 2025-03-26 PROCEDURE — 3078F DIAST BP <80 MM HG: CPT | Performed by: INTERNAL MEDICINE

## 2025-03-26 PROCEDURE — 1159F MED LIST DOCD IN RCRD: CPT | Performed by: INTERNAL MEDICINE

## 2025-03-26 PROCEDURE — 93005 ELECTROCARDIOGRAM TRACING: CPT | Performed by: INTERNAL MEDICINE

## 2025-03-26 PROCEDURE — 1123F ACP DISCUSS/DSCN MKR DOCD: CPT | Performed by: INTERNAL MEDICINE

## 2025-03-26 PROCEDURE — 3074F SYST BP LT 130 MM HG: CPT | Performed by: INTERNAL MEDICINE

## 2025-03-26 PROCEDURE — 93010 ELECTROCARDIOGRAM REPORT: CPT | Performed by: INTERNAL MEDICINE

## 2025-03-26 RX ORDER — ROSUVASTATIN CALCIUM 40 MG/1
40 TABLET, COATED ORAL NIGHTLY
Qty: 90 TABLET | Refills: 3 | Status: SHIPPED | OUTPATIENT
Start: 2025-03-26

## 2025-03-26 NOTE — PROGRESS NOTES
intervertebral disc        Stress: 12/11/2017  1.  No ischemia or infarction.  2.  Normal LVEF = 60%.  3.  No wall motion abnormalities.     Echo 12/11/2017   1.  Left ventricular dimensions are within normal limits.  LV ejection fraction is 50% to 55%.  2.  Grade 1 diastolic dysfunction.  3.  Normal right ventricular systolic function.  4.  Focal thickening of mitral valve noted, mild-to-moderate mitral regurgitation.  5.  Mild tricuspid regurgitation.  Right ventricular systolic pressure 35 mmHg.  6.  No pericardial effusion.     Nuclear stress test 4/27/22  IMPRESSION:  1.  No ischemia or infarction.  2.  Normal LV systolic function; LVEF 62%.     TTE 4/27/22  Summary  Normal left ventricular diameter.  Left ventricular systolic function is normal.  Left ventricular ejection fraction 60 to 65 %.  Grade III (severe) left ventricular diastolic dysfunction.  Left atrium is mildly dilated.  Right atrial dilatation.  Right ventricular dilatation with normal systolic function.  Aortic valve sclerosis without stenosis.  Mild mitral valve thickening with annular calcification.  Mild to moderate mitral regurgitation.  Normal tricuspid valve leaflets.  Mild tricuspid regurgitation.  Estimated right ventricular systolic pressure is 42 mmHg.  Mild pulmonary hypertension.  No significant pericardial effusion is seen        Assessment and plan:     -Abnormal ECG. Nuclear stress test negative for ischemia 4/27/22. Stable.   -PVCs- continue metoprolol. Normal LVEF on TTE 4/27/22. Stable.  -Leg edema- Lasix 20mg po qday PRN. Compression stocking. Stable  -HTN- Continue metoprolol and norvasc. Monitor BP at home. Stable.  -Hyperlipidemia-  on 01/14/25. Increase crestor to 40mg po qhs.   -Hypothyroidism- on replacement.  -S/p R TKA- winter 2018  - S/p L TKA 5/2022  -RTC 6 months.    Shine Paulino MD  [facility]  [phone]

## 2025-04-29 ENCOUNTER — OFFICE VISIT (OUTPATIENT)
Dept: DIABETES SERVICES | Age: 80
End: 2025-04-29
Payer: MEDICARE

## 2025-04-29 VITALS
HEART RATE: 78 BPM | WEIGHT: 164 LBS | DIASTOLIC BLOOD PRESSURE: 68 MMHG | OXYGEN SATURATION: 98 % | SYSTOLIC BLOOD PRESSURE: 102 MMHG | BODY MASS INDEX: 27.32 KG/M2 | HEIGHT: 65 IN

## 2025-04-29 DIAGNOSIS — E78.49 OTHER HYPERLIPIDEMIA: ICD-10-CM

## 2025-04-29 PROCEDURE — 1159F MED LIST DOCD IN RCRD: CPT | Performed by: NURSE PRACTITIONER

## 2025-04-29 PROCEDURE — 1123F ACP DISCUSS/DSCN MKR DOCD: CPT | Performed by: NURSE PRACTITIONER

## 2025-04-29 PROCEDURE — 99214 OFFICE O/P EST MOD 30 MIN: CPT | Performed by: NURSE PRACTITIONER

## 2025-04-29 PROCEDURE — 1160F RVW MEDS BY RX/DR IN RCRD: CPT | Performed by: NURSE PRACTITIONER

## 2025-04-29 PROCEDURE — G2211 COMPLEX E/M VISIT ADD ON: HCPCS | Performed by: NURSE PRACTITIONER

## 2025-04-29 PROCEDURE — 99212 OFFICE O/P EST SF 10 MIN: CPT

## 2025-04-29 PROCEDURE — 3078F DIAST BP <80 MM HG: CPT | Performed by: NURSE PRACTITIONER

## 2025-04-29 PROCEDURE — 3074F SYST BP LT 130 MM HG: CPT | Performed by: NURSE PRACTITIONER

## 2025-04-29 RX ORDER — SEMAGLUTIDE 0.25 MG/.5ML
0.25 INJECTION, SOLUTION SUBCUTANEOUS
Qty: 2 ML | Refills: 0 | Status: SHIPPED | COMMUNITY
Start: 2025-04-29

## 2025-04-29 ASSESSMENT — ENCOUNTER SYMPTOMS
SHORTNESS OF BREATH: 0
RESPIRATORY NEGATIVE: 1

## 2025-04-29 NOTE — PROGRESS NOTES
Patient Care Team:  Jamar Mcneil MD as PCP - General (Internal Medicine)  Jamar Mcneil MD as PCP - Empaneled Provider  Desmond Silveira MD as Consulting Physician (Pulmonology)  Lupe Parker MD (Physical Medicine and Rehab)  Ross Vera MD as Surgeon (Orthopedic Surgery)  Yaakov Sanchez DO as Referring Physician (Sports Medicine)      History of Present Illness  The patient presents for evaluation of weight loss, hyperlipidemia management, and hypothyroidism.    The patient reports increased body mass over the past 1.5 years, deviating from their previously lean physique. Dietary habits are influenced by their spouse, who consumes a diet predominantly consisting of red meat, with an absence of chicken, turkey, fish, and vegetables. 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 Although they do not engage in regular gym workouts, they enjoy walking their dog around [place], weather permitting. Fluid intake primarily consists of water, with occasional consumption of soda during meals. The patient typically consumes a small breakfast and dinner and has attempted intermittent fasting without noticeable results. They have expressed interest in trying semaglutide (Ozempic) for weight loss. There is no history of pancreatitis or thyroid carcinoma.    A recent annual checkup with Dr. Arana was conducted. A few weeks ago, Dr. Paulino informed the patient that their cholesterol levels were elevated. The patient is currently on statin therapy and was advised to double the dosage. However, Dr. Arana did not mention elevated cholesterol levels during the checkup. After increasing the dosage to 2 tablets at night, the patient experienced tremors and tachycardia the following morning. They have since alternated between taking 2 tablets one day and 1 tablet the next without any adverse effects. The patient expresses a

## 2025-05-08 ENCOUNTER — HOSPITAL ENCOUNTER (OUTPATIENT)
Dept: GENERAL RADIOLOGY | Age: 80
Discharge: HOME | End: 2025-05-10
Payer: MEDICARE

## 2025-05-08 ENCOUNTER — OFFICE VISIT (OUTPATIENT)
Dept: INTERNAL MEDICINE | Age: 80
End: 2025-05-08
Payer: MEDICARE

## 2025-05-08 VITALS
TEMPERATURE: 99.8 F | BODY MASS INDEX: 26.49 KG/M2 | DIASTOLIC BLOOD PRESSURE: 60 MMHG | HEIGHT: 65 IN | OXYGEN SATURATION: 97 % | WEIGHT: 159 LBS | SYSTOLIC BLOOD PRESSURE: 128 MMHG | RESPIRATION RATE: 16 BRPM | HEART RATE: 82 BPM

## 2025-05-08 DIAGNOSIS — I10 PRIMARY HYPERTENSION: ICD-10-CM

## 2025-05-08 DIAGNOSIS — E78.49 OTHER HYPERLIPIDEMIA: ICD-10-CM

## 2025-05-08 DIAGNOSIS — R05.1 ACUTE COUGH: ICD-10-CM

## 2025-05-08 DIAGNOSIS — R05.2 SUBACUTE COUGH: ICD-10-CM

## 2025-05-08 DIAGNOSIS — J20.9 ACUTE BRONCHITIS, UNSPECIFIED ORGANISM: Primary | ICD-10-CM

## 2025-05-08 DIAGNOSIS — R93.89 ABNORMAL CHEST X-RAY: ICD-10-CM

## 2025-05-08 DIAGNOSIS — J20.9 ACUTE BRONCHITIS, UNSPECIFIED ORGANISM: ICD-10-CM

## 2025-05-08 PROCEDURE — 99212 OFFICE O/P EST SF 10 MIN: CPT | Performed by: INTERNAL MEDICINE

## 2025-05-08 PROCEDURE — 71046 X-RAY EXAM CHEST 2 VIEWS: CPT

## 2025-05-08 RX ORDER — AZITHROMYCIN 250 MG/1
TABLET, FILM COATED ORAL
Qty: 8 TABLET | Refills: 0 | Status: SHIPPED | OUTPATIENT
Start: 2025-05-08

## 2025-05-08 RX ORDER — BENZONATATE 100 MG/1
100 CAPSULE ORAL 3 TIMES DAILY PRN
Qty: 30 CAPSULE | Refills: 2 | Status: SHIPPED | OUTPATIENT
Start: 2025-05-08 | End: 2025-06-07

## 2025-05-08 ASSESSMENT — ENCOUNTER SYMPTOMS
EYE PAIN: 0
DIARRHEA: 0
CONSTIPATION: 0
BLOOD IN STOOL: 0
SHORTNESS OF BREATH: 0
ABDOMINAL PAIN: 0
BACK PAIN: 0
VOMITING: 0
COUGH: 1
NAUSEA: 0

## 2025-05-08 NOTE — PROGRESS NOTES
[facility]  [facility] MED A DEPARTMENT OF [facility]  [address] [facility] [address]  Dept: [phone]  Dept Fax: [phone]  Loc: [phone]     Palma Silva is a 80 y.o. female who presents today for her medical conditions/complaintsas noted below.  Palma Silva is c/o of   Chief Complaint   Patient presents with    Cough     Chronic    Hypertension    Hyperlipidemia         HPI:     Cough  This is a new problem. The current episode started 1 to 4 weeks ago. The problem has been waxing and waning. The problem occurs hourly. Pertinent negatives include no chest pain, chills, fever, headaches, rash or shortness of breath.   Hypertension  This is a chronic problem. The current episode started more than 1 year ago. The problem has been waxing and waning since onset. The problem is controlled. Pertinent negatives include no chest pain, headaches, neck pain, palpitations or shortness of breath.   Hyperlipidemia  This is a chronic problem. The current episode started more than 1 year ago. The problem is controlled. Recent lipid tests were reviewed and are variable. Pertinent negatives include no chest pain or shortness of breath.   Other  This is a new (4-bronchitis, acute) problem. The current episode started 1 to 4 weeks ago. The problem occurs constantly. The problem has been waxing and waning. Associated symptoms include coughing. Pertinent negatives include no abdominal pain, arthralgias, chest pain, chills, fever, headaches, nausea, neck pain, numbness, rash, vomiting or weakness.       No results found for: \"LABA1C\"         No components found for: \"LABMICR\"  No components found for: \"LDLCHOLESTEROL\", \"LDLCALC\"      AST (U/L)   Date Value   01/14/2025 23     ALT (U/L)   Date Value   01/14/2025 24     BUN (mg/dL)   Date Value   01/14/2025 18     BP Readings from Last 3 Encounters:   05/08/25 128/60   04/29/25 102/68   03/26/25 120/70              Past

## 2025-05-09 ENCOUNTER — RESULTS FOLLOW-UP (OUTPATIENT)
Dept: INTERNAL MEDICINE | Age: 80
End: 2025-05-09

## 2025-05-09 DIAGNOSIS — Z09 FOLLOW-UP EXAM: Primary | ICD-10-CM

## 2025-05-11 ENCOUNTER — HOSPITAL ENCOUNTER (EMERGENCY)
Age: 80
Discharge: HOME | End: 2025-05-11
Attending: EMERGENCY MEDICINE
Payer: MEDICARE

## 2025-05-11 VITALS
TEMPERATURE: 99.4 F | OXYGEN SATURATION: 96 % | SYSTOLIC BLOOD PRESSURE: 135 MMHG | HEART RATE: 85 BPM | DIASTOLIC BLOOD PRESSURE: 55 MMHG | RESPIRATION RATE: 18 BRPM

## 2025-05-11 DIAGNOSIS — J18.9 PNEUMONIA OF BOTH LUNGS DUE TO INFECTIOUS ORGANISM, UNSPECIFIED PART OF LUNG: Primary | ICD-10-CM

## 2025-05-11 LAB
FLUAV AG SPEC QL: NEGATIVE
FLUBV AG SPEC QL: NEGATIVE
RSV BY PCR: NEGATIVE
SARS-COV-2 RDRP RESP QL NAA+PROBE: NOT DETECTED
SPECIMEN DESCRIPTION: NORMAL
SPECIMEN SOURCE: NORMAL

## 2025-05-11 PROCEDURE — 99283 EMERGENCY DEPT VISIT LOW MDM: CPT

## 2025-05-11 PROCEDURE — 6370000000 HC RX 637 (ALT 250 FOR IP): Performed by: EMERGENCY MEDICINE

## 2025-05-11 PROCEDURE — 6360000002 HC RX W HCPCS: Performed by: EMERGENCY MEDICINE

## 2025-05-11 PROCEDURE — 94640 AIRWAY INHALATION TREATMENT: CPT

## 2025-05-11 PROCEDURE — 87804 INFLUENZA ASSAY W/OPTIC: CPT

## 2025-05-11 PROCEDURE — 87798 DETECT AGENT NOS DNA AMP: CPT

## 2025-05-11 PROCEDURE — 87635 SARS-COV-2 COVID-19 AMP PRB: CPT

## 2025-05-11 RX ORDER — ALBUTEROL SULFATE 0.83 MG/ML
2.5 SOLUTION RESPIRATORY (INHALATION) ONCE
Status: COMPLETED | OUTPATIENT
Start: 2025-05-11 | End: 2025-05-11

## 2025-05-11 RX ORDER — CEPHALEXIN 500 MG/1
500 CAPSULE ORAL 4 TIMES DAILY
Qty: 28 CAPSULE | Refills: 0 | Status: SHIPPED | OUTPATIENT
Start: 2025-05-11 | End: 2025-05-18

## 2025-05-11 RX ORDER — HYDROCODONE BITARTRATE AND ACETAMINOPHEN 5; 325 MG/1; MG/1
1 TABLET ORAL ONCE
Status: COMPLETED | OUTPATIENT
Start: 2025-05-11 | End: 2025-05-11

## 2025-05-11 RX ORDER — HYDROCODONE BITARTRATE AND ACETAMINOPHEN 5; 325 MG/1; MG/1
1 TABLET ORAL EVERY 6 HOURS PRN
Qty: 12 TABLET | Refills: 0 | Status: SHIPPED | OUTPATIENT
Start: 2025-05-11 | End: 2025-05-14

## 2025-05-11 RX ADMIN — ALBUTEROL SULFATE 2.5 MG: 2.5 SOLUTION RESPIRATORY (INHALATION) at 16:49

## 2025-05-11 RX ADMIN — HYDROCODONE BITARTRATE AND ACETAMINOPHEN 1 TABLET: 5; 325 TABLET ORAL at 16:46

## 2025-05-11 ASSESSMENT — PAIN DESCRIPTION - LOCATION
LOCATION: GENERALIZED
LOCATION: GENERALIZED

## 2025-05-11 ASSESSMENT — PAIN - FUNCTIONAL ASSESSMENT: PAIN_FUNCTIONAL_ASSESSMENT: 0-10

## 2025-05-11 ASSESSMENT — PAIN SCALES - GENERAL: PAINLEVEL_OUTOF10: 4

## 2025-05-11 ASSESSMENT — PAIN DESCRIPTION - DESCRIPTORS
DESCRIPTORS: ACHING
DESCRIPTORS: ACHING

## 2025-05-11 NOTE — DISCHARGE INSTRUCTIONS
Continue Zithromax and other medications as directed.  May use Norco for cough and pain.  Add Keflex.  Return for worsening pain, cough, difficulty breathing, high fever, or if worse in any way.    Please understand that at this time there is no evidence for a more serious underlying process, but that early in the process of an illness or injury, an emergency department workup can be falsely reassuring.  You should contact your family doctor within the next 48 hours for a follow up appointment    THANK YOU!!!    From [facility] and [facility]    On behalf of the Emergency Department staff at [facility], I would like to thank you for giving us the opportunity to address your health care needs and concerns.    We hope that during your visit, our service was delivered in a professional and caring manner. Please keep [facility] in mind as we walk with you down the path to your own personal wellness.     Please expect an automated text message or email from us so we can ask a few questions about your health and progress. Based on your answers, a clinician may call you back to offer help and instructions.    Please understand that early in the process of an illness or injury, an emergency department workup can be falsely reassuring.  If you notice any worsening, changing or persistent symptoms please call your family doctor or return to the ER immediately.     Tell us how we did during your visit at [email or web address]   and let us know about your experience

## 2025-05-11 NOTE — ED PROVIDER NOTES
[facility] Emergency Department  [address] [facility] [address]  Phone: [phone]      Patient Name:  Palma Silva  Medical Record Number:  [id number]  YOB: 1945  Date of Service:  5/11/2025  Primary Care Physician:  Jamar Mcneil MD      CHIEF COMPLAINT:       Chief Complaint   Patient presents with    Cough    Fatigue     Seen Thursday and diagnosed with pneumonia       HISTORY OF PRESENT ILLNESS:    Palma Silva is a 80 y.o. female who presents with the complaint of cough and fatigue.  She was seen by her doctor on 8 May.  She was placed on Zithromax.  Chest x-ray obtained that day demonstrated patchy opacities within the right lower left upper lobe suspicious for multifocal pneumonia, per radiology interpretation.  She states that despite being on Tessalon Perles and extra strength Robitussin, she is coughing frequently.  It is keeping her awake.  She has noted a temperature up to 102.  She is able to eat and drink though she says her appetite is much suppressed.  She does not normally use inhalers.  She does not feel like she is wheezing.  She is frustrated because she is so tired.  However, she denies any pain in her chest.  Her cough is productive of yellow-green sputum.    CURRENT MEDICATIONS:      Previous Medications    AMLODIPINE (NORVASC) 10 MG TABLET    Take 1 tablet by mouth daily    AZITHROMYCIN (ZITHROMAX) 250 MG TABLET    Take 2 tablets by mouth on day 1, then take 1 tablet daily by mouth for 6 more days.    BENZONATATE (TESSALON) 100 MG CAPSULE    Take 1 capsule by mouth 3 times daily as needed for Cough    CALCIUM CARB-CHOLECALCIFEROL (CALCIUM 600+D3 PO)    Take 2 capsules by mouth daily    FUROSEMIDE (LASIX) 20 MG TABLET    TAKE 1 TABLET DAILY    METOPROLOL TARTRATE (LOPRESSOR) 25 MG TABLET    TAKE 1 TABLET TWICE A DAY    MULTIPLE VITAMINS-MINERALS (MULTIVITAMIN PO)    Take 2 tablets by mouth every morning     OXYBUTYNIN (DITROPAN-XL) 10 MG EXTENDED RELEASE

## 2025-05-13 ENCOUNTER — TELEPHONE (OUTPATIENT)
Dept: INTERNAL MEDICINE | Age: 80
End: 2025-05-13

## 2025-05-13 RX ORDER — ALBUTEROL SULFATE 90 UG/1
2 INHALANT RESPIRATORY (INHALATION) 4 TIMES DAILY PRN
Qty: 18 G | Refills: 3 | Status: SHIPPED | OUTPATIENT
Start: 2025-05-13

## 2025-05-13 NOTE — TELEPHONE ENCOUNTER
Ask them to measure and monitor her temperature.  Prior to going into the emergency room 2 days ago on the 11th, she told the emergency room doctor she had a temperature of 102.  IS she still having fever? is it getting worse?  Or is she no longer having fever, which would be a sign that things are getting better.  They should probably be taking her temperature at least daily and recording it on paper, so they can follow a trend and get a better idea if there is any improvement or worsening in terms of temperature/fever.  Patient also be monitoring her respiratory rate  that she is breathing.   if she is working extra hard and breathing faster and still feeling short short of breath, then that means she is getting worse and she may need to go back into urgent care/emergency room.  However, if she is not having worsening in her respiratory rate or worsening in her temperature then she is probably doing better than she was before.  She may have a lot of phlegm that still needs to be coughed up.  The second antibiotic was only added 2 days ago, and the first 1 not long before that.  In addition,  they can obtain a pulse ox which is available over-the-counter.  They could be recording daily or even twice daily pulse ox.    Again, if her fever/temperature is getting worse or respiratory rate is getting worse meaning she has to breathe a lot faster just to catch her breath or if the pulse ox is getting worse especially if it is staying under 90%, then she should go back into the emergency room for any of those 3 reasons.

## 2025-05-13 NOTE — TELEPHONE ENCOUNTER
Writer spoke with Enzo and he stated that her fevers have been running around 101ish. She is taking Tylenol regularly which will bring the fever down but once it wears off the fever starts to come back. She is feeling a little bit better, her color is looking a little better. She is still coughing some and wanted to know if she could possibly get an inhaler to help with this. She is not really having any shortness of breath, but if she does start talking she will start coughing really bad. They do not have pulse ox reader at home, but was advised to get one.    Please advise on the inhaler

## 2025-05-13 NOTE — TELEPHONE ENCOUNTER
Patient was seen in UC on 5/11/25 and was dx with double pneumonia.    We sent in a zpak for her, the UC sent in Keflex 500 mg 4 x daily x 7 days with a 3 day supply of Norco for pain.    Please advise on your recommendations before I call Enzo back

## 2025-05-13 NOTE — TELEPHONE ENCOUNTER
Okay to pend albuterol type inhaler whether that Ventolin, ProAir, or whenever her insurance will cover

## 2025-05-13 NOTE — TELEPHONE ENCOUNTER
Enzo calling in stating that patient is not getting any better and is not worse but has no improvement and that patient sounds horrible and he is unsure what to do. He would like jasmina to call him back.

## 2025-05-19 NOTE — TELEPHONE ENCOUNTER
Palma called requesting a refill of the below medication which has been pended for you:     Requested Prescriptions     Pending Prescriptions Disp Refills    amLODIPine (NORVASC) 10 MG tablet [Pharmacy Med Name: AMLODIPINE TAB 10MG] 90 tablet 3     Sig: TAKE 1 TABLET DAILY       Last Appointment Date: 5/8/2025  Next Appointment Date: 7/24/2025    Allergies   Allergen Reactions    Levaquin [Levofloxacin In D5w] Other (See Comments)     Attacked muscles and tendons    Iodine Other (See Comments)     Burns       Penicillins Other (See Comments)     abcess at site of injection

## 2025-05-20 RX ORDER — AMLODIPINE BESYLATE 10 MG/1
10 TABLET ORAL DAILY
Qty: 90 TABLET | Refills: 3 | Status: SHIPPED | OUTPATIENT
Start: 2025-05-20

## 2025-05-28 ENCOUNTER — HOSPITAL ENCOUNTER (OUTPATIENT)
Dept: PULMONOLOGY | Age: 80
Discharge: HOME | End: 2025-05-28
Payer: MEDICARE

## 2025-05-28 DIAGNOSIS — R05.2 SUBACUTE COUGH: ICD-10-CM

## 2025-05-28 DIAGNOSIS — J20.9 ACUTE BRONCHITIS, UNSPECIFIED ORGANISM: ICD-10-CM

## 2025-05-28 DIAGNOSIS — R93.89 ABNORMAL CHEST X-RAY: ICD-10-CM

## 2025-05-28 PROCEDURE — 94640 AIRWAY INHALATION TREATMENT: CPT

## 2025-05-28 PROCEDURE — 6370000000 HC RX 637 (ALT 250 FOR IP): Performed by: INTERNAL MEDICINE

## 2025-05-28 PROCEDURE — 94729 DIFFUSING CAPACITY: CPT

## 2025-05-28 PROCEDURE — 94726 PLETHYSMOGRAPHY LUNG VOLUMES: CPT

## 2025-05-28 PROCEDURE — 94060 EVALUATION OF WHEEZING: CPT

## 2025-05-28 RX ORDER — ALBUTEROL SULFATE 90 UG/1
4 INHALANT RESPIRATORY (INHALATION) ONCE
Status: COMPLETED | OUTPATIENT
Start: 2025-05-28 | End: 2025-05-28

## 2025-05-28 RX ADMIN — ALBUTEROL SULFATE 4 PUFF: 90 AEROSOL, METERED RESPIRATORY (INHALATION) at 14:22

## 2025-05-29 ENCOUNTER — RESULTS FOLLOW-UP (OUTPATIENT)
Dept: INTERNAL MEDICINE | Age: 80
End: 2025-05-29

## 2025-05-29 DIAGNOSIS — R05.3 CHRONIC COUGH: Primary | ICD-10-CM

## 2025-06-16 NOTE — TELEPHONE ENCOUNTER
----- Message from Dr. Jamar Mcneil MD sent at 6/16/2025 10:30 AM EDT -----  Pend order to refer patient for pulmonary consult for full interpretation and further recommendations

## (undated) DEVICE — Device

## (undated) DEVICE — GLOVE SURG SZ 85 CRM LTX FREE POLYISOPRENE POLYMER BEAD ANTI

## (undated) DEVICE — 4.0MM PRECISION ROUND

## (undated) DEVICE — VISIONAIRE JOURNEY II CUTTING                                    BLOCK KIT- RIGHT: Brand: VISIONAIRE

## (undated) DEVICE — SUTURE MONOCRYL SZ 4-0 L27IN ABSRB UD L19MM PS-2 1/2 CIR PRIM Y426H

## (undated) DEVICE — GLOVE SURG SZ 85 L12IN FNGR THK13MIL WHT ISOLEX POLYISOPRENE

## (undated) DEVICE — 2108 SERIES SAGITTAL BLADE (19.5 X 1.27 X 81.0MM)

## (undated) DEVICE — PROTECTOR PRSS FOR PRSS

## (undated) DEVICE — GLOVE SURG SZ 85 L12IN FNGR THK79MIL GRN LTX FREE

## (undated) DEVICE — NEEDLE SPNL L3.5IN PNK HUB S STL REG WALL FIT STYL W/ QNCKE

## (undated) DEVICE — GLOVE SURG SZ 85 L12IN FNGR THK87MIL WHT LTX FREE

## (undated) DEVICE — 1010 S-DRAPE TOWEL DRAPE 10/BX: Brand: STERI-DRAPE™

## (undated) DEVICE — SUTURE VICRYL + SZ 2-0 L36IN ABSRB UD L36MM CT-1 1/2 CIR VCP945H

## (undated) DEVICE — PROTECTOR EYE PT SELF ADH NS OPT GRD LF

## (undated) DEVICE — ZIPPERED TOGA, 2X LARGE: Brand: FLYTE, SURGICOOL

## (undated) DEVICE — 4-PORT MANIFOLD: Brand: NEPTUNE 2

## (undated) DEVICE — PAD COOL W10.9XL11.3IN UNIV REG HOSE WRP ON THER CLD

## (undated) DEVICE — SUTURE VICRYL + SZ 0 L27IN ABSRB UD L36MM CT-1 1/2 CIR VCPP41D

## (undated) DEVICE — DRAPE,U/ SHT,SPLIT,PLAS,STERIL: Brand: MEDLINE

## (undated) DEVICE — TRAY URIN CATH 16FR DRNGE BG STATLOK STBL DEV F SURSTP

## (undated) DEVICE — 3M™ WARMING BLANKET, UPPER BODY, 10 PER CASE, 42268: Brand: BAIR HUGGER™

## (undated) DEVICE — MANIFOLD REPROC SUCT 4 PRT F/NEPTUNE 2 WST MGMT SYS

## (undated) DEVICE — COVER,TABLE,HEAVY DUTY,50"X90",STRL: Brand: MEDLINE

## (undated) DEVICE — GAUZE, BORDER, 3"X6", 1.5"X4"PAD, STERIL: Brand: MEDLINE INDUSTRIES, INC.

## (undated) DEVICE — SOLUTION IV 250ML 0.9% SOD CHL PH 5 INJ USP VIAFLX PLAS

## (undated) DEVICE — BIPOLAR SEALER 23-112-1 AQM 6.0: Brand: AQUAMANTYS ®

## (undated) DEVICE — C-ARM: Brand: UNBRANDED

## (undated) DEVICE — 3M™ STERI-DRAPE™ INCISE DRAPE 1050 (60CM X 45CM): Brand: STERI-DRAPE™

## (undated) DEVICE — Z DISCONTINUED PER MEDLINE USE 2741943 DRESSING AQUACEL 10 IN ALG W9XL25CM SIL CVR WTRPRF VIR BACT BARR ANTIMIC

## (undated) DEVICE — CEMENT MIXING SYSTEM WITH FEMORAL BREAKWAY NOZZLE: Brand: REVOLUTION